# Patient Record
Sex: FEMALE | Race: WHITE | NOT HISPANIC OR LATINO
[De-identification: names, ages, dates, MRNs, and addresses within clinical notes are randomized per-mention and may not be internally consistent; named-entity substitution may affect disease eponyms.]

---

## 2023-01-01 ENCOUNTER — TRANSCRIPTION ENCOUNTER (OUTPATIENT)
Age: 63
End: 2023-01-01

## 2023-01-01 ENCOUNTER — INPATIENT (INPATIENT)
Facility: HOSPITAL | Age: 63
LOS: 11 days | Discharge: EXPIRED | End: 2023-05-21
Attending: INTERNAL MEDICINE | Admitting: STUDENT IN AN ORGANIZED HEALTH CARE EDUCATION/TRAINING PROGRAM
Payer: MEDICAID

## 2023-01-01 VITALS
DIASTOLIC BLOOD PRESSURE: 23 MMHG | RESPIRATION RATE: 7 BRPM | OXYGEN SATURATION: 86 % | SYSTOLIC BLOOD PRESSURE: 37 MMHG

## 2023-01-01 VITALS
HEART RATE: 107 BPM | SYSTOLIC BLOOD PRESSURE: 104 MMHG | TEMPERATURE: 98 F | WEIGHT: 204.81 LBS | HEIGHT: 64 IN | DIASTOLIC BLOOD PRESSURE: 63 MMHG

## 2023-01-01 DIAGNOSIS — J96.90 RESPIRATORY FAILURE, UNSPECIFIED, UNSPECIFIED WHETHER WITH HYPOXIA OR HYPERCAPNIA: ICD-10-CM

## 2023-01-01 DIAGNOSIS — R53.81 OTHER MALAISE: ICD-10-CM

## 2023-01-01 DIAGNOSIS — Z51.5 ENCOUNTER FOR PALLIATIVE CARE: ICD-10-CM

## 2023-01-01 DIAGNOSIS — J84.9 INTERSTITIAL PULMONARY DISEASE, UNSPECIFIED: ICD-10-CM

## 2023-01-01 DIAGNOSIS — J98.9 RESPIRATORY DISORDER, UNSPECIFIED: ICD-10-CM

## 2023-01-01 DIAGNOSIS — Z71.89 OTHER SPECIFIED COUNSELING: ICD-10-CM

## 2023-01-01 DIAGNOSIS — F41.9 ANXIETY DISORDER, UNSPECIFIED: ICD-10-CM

## 2023-01-01 DIAGNOSIS — R06.00 DYSPNEA, UNSPECIFIED: ICD-10-CM

## 2023-01-01 LAB
A1C WITH ESTIMATED AVERAGE GLUCOSE RESULT: 7.1 % — HIGH (ref 4–5.6)
ALBUMIN SERPL ELPH-MCNC: 2 G/DL — LOW (ref 3.3–5)
ALBUMIN SERPL ELPH-MCNC: 2 G/DL — LOW (ref 3.3–5)
ALBUMIN SERPL ELPH-MCNC: 2.2 G/DL — LOW (ref 3.3–5)
ALBUMIN SERPL ELPH-MCNC: 2.3 G/DL — LOW (ref 3.3–5)
ALBUMIN SERPL ELPH-MCNC: 2.3 G/DL — LOW (ref 3.3–5)
ALBUMIN SERPL ELPH-MCNC: 2.4 G/DL — LOW (ref 3.3–5)
ALBUMIN SERPL ELPH-MCNC: 2.6 G/DL — LOW (ref 3.3–5)
ALBUMIN SERPL ELPH-MCNC: 2.8 G/DL — LOW (ref 3.3–5)
ALP SERPL-CCNC: 129 U/L — HIGH (ref 40–120)
ALP SERPL-CCNC: 130 U/L — HIGH (ref 40–120)
ALP SERPL-CCNC: 131 U/L — HIGH (ref 40–120)
ALP SERPL-CCNC: 132 U/L — HIGH (ref 40–120)
ALP SERPL-CCNC: 134 U/L — HIGH (ref 40–120)
ALP SERPL-CCNC: 134 U/L — HIGH (ref 40–120)
ALP SERPL-CCNC: 136 U/L — HIGH (ref 40–120)
ALP SERPL-CCNC: 137 U/L — HIGH (ref 40–120)
ALP SERPL-CCNC: 138 U/L — HIGH (ref 40–120)
ALP SERPL-CCNC: 143 U/L — HIGH (ref 40–120)
ALP SERPL-CCNC: 144 U/L — HIGH (ref 40–120)
ALP SERPL-CCNC: 146 U/L — HIGH (ref 40–120)
ALP SERPL-CCNC: 146 U/L — HIGH (ref 40–120)
ALP SERPL-CCNC: 150 U/L — HIGH (ref 40–120)
ALP SERPL-CCNC: 154 U/L — HIGH (ref 40–120)
ALT FLD-CCNC: 12 U/L — SIGNIFICANT CHANGE UP (ref 4–33)
ALT FLD-CCNC: 13 U/L — SIGNIFICANT CHANGE UP (ref 4–33)
ALT FLD-CCNC: 13 U/L — SIGNIFICANT CHANGE UP (ref 4–33)
ALT FLD-CCNC: 14 U/L — SIGNIFICANT CHANGE UP (ref 4–33)
ALT FLD-CCNC: 15 U/L — SIGNIFICANT CHANGE UP (ref 4–33)
ALT FLD-CCNC: 16 U/L — SIGNIFICANT CHANGE UP (ref 4–33)
ALT FLD-CCNC: 18 U/L — SIGNIFICANT CHANGE UP (ref 4–33)
ALT FLD-CCNC: 19 U/L — SIGNIFICANT CHANGE UP (ref 4–33)
ALT FLD-CCNC: 20 U/L — SIGNIFICANT CHANGE UP (ref 4–33)
ALT FLD-CCNC: 20 U/L — SIGNIFICANT CHANGE UP (ref 4–33)
ALT FLD-CCNC: 21 U/L — SIGNIFICANT CHANGE UP (ref 4–33)
ALT FLD-CCNC: 21 U/L — SIGNIFICANT CHANGE UP (ref 4–33)
ALT FLD-CCNC: 22 U/L — SIGNIFICANT CHANGE UP (ref 4–33)
ALT FLD-CCNC: 25 U/L — SIGNIFICANT CHANGE UP (ref 4–33)
ALT FLD-CCNC: 28 U/L — SIGNIFICANT CHANGE UP (ref 4–33)
ANA TITR SER: NEGATIVE — SIGNIFICANT CHANGE UP
ANION GAP SERPL CALC-SCNC: 10 MMOL/L — SIGNIFICANT CHANGE UP (ref 7–14)
ANION GAP SERPL CALC-SCNC: 11 MMOL/L — SIGNIFICANT CHANGE UP (ref 7–14)
ANION GAP SERPL CALC-SCNC: 12 MMOL/L — SIGNIFICANT CHANGE UP (ref 7–14)
ANION GAP SERPL CALC-SCNC: 13 MMOL/L — SIGNIFICANT CHANGE UP (ref 7–14)
ANION GAP SERPL CALC-SCNC: 9 MMOL/L — SIGNIFICANT CHANGE UP (ref 7–14)
ANISOCYTOSIS BLD QL: SIGNIFICANT CHANGE UP
ANISOCYTOSIS BLD QL: SLIGHT — SIGNIFICANT CHANGE UP
APTT BLD: 157.7 SEC — CRITICAL HIGH (ref 27–36.3)
APTT BLD: 30.8 SEC — SIGNIFICANT CHANGE UP (ref 27–36.3)
APTT BLD: 33.1 SEC — SIGNIFICANT CHANGE UP (ref 27–36.3)
APTT BLD: 60.4 SEC — HIGH (ref 27–36.3)
APTT BLD: 62.7 SEC — HIGH (ref 27–36.3)
APTT BLD: 82.2 SEC — HIGH (ref 27–36.3)
AST SERPL-CCNC: 13 U/L — SIGNIFICANT CHANGE UP (ref 4–32)
AST SERPL-CCNC: 14 U/L — SIGNIFICANT CHANGE UP (ref 4–32)
AST SERPL-CCNC: 15 U/L — SIGNIFICANT CHANGE UP (ref 4–32)
AST SERPL-CCNC: 16 U/L — SIGNIFICANT CHANGE UP (ref 4–32)
AST SERPL-CCNC: 17 U/L — SIGNIFICANT CHANGE UP (ref 4–32)
AST SERPL-CCNC: 17 U/L — SIGNIFICANT CHANGE UP (ref 4–32)
AST SERPL-CCNC: 18 U/L — SIGNIFICANT CHANGE UP (ref 4–32)
AST SERPL-CCNC: 19 U/L — SIGNIFICANT CHANGE UP (ref 4–32)
AST SERPL-CCNC: 19 U/L — SIGNIFICANT CHANGE UP (ref 4–32)
AST SERPL-CCNC: 20 U/L — SIGNIFICANT CHANGE UP (ref 4–32)
AST SERPL-CCNC: 20 U/L — SIGNIFICANT CHANGE UP (ref 4–32)
AST SERPL-CCNC: 21 U/L — SIGNIFICANT CHANGE UP (ref 4–32)
AST SERPL-CCNC: 21 U/L — SIGNIFICANT CHANGE UP (ref 4–32)
AUTO DIFF PNL BLD: ABNORMAL
B PERT DNA SPEC QL NAA+PROBE: SIGNIFICANT CHANGE UP
B PERT+PARAPERT DNA PNL SPEC NAA+PROBE: SIGNIFICANT CHANGE UP
BASE EXCESS BLDA CALC-SCNC: 11 MMOL/L — HIGH (ref -2–3)
BASE EXCESS BLDA CALC-SCNC: 4.3 MMOL/L — HIGH (ref -2–3)
BASE EXCESS BLDA CALC-SCNC: 4.7 MMOL/L — HIGH (ref -2–3)
BASE EXCESS BLDA CALC-SCNC: 5 MMOL/L — HIGH (ref -2–3)
BASE EXCESS BLDA CALC-SCNC: 5.5 MMOL/L — HIGH (ref -2–3)
BASE EXCESS BLDA CALC-SCNC: 5.6 MMOL/L — HIGH (ref -2–3)
BASE EXCESS BLDA CALC-SCNC: 6.2 MMOL/L — HIGH (ref -2–3)
BASE EXCESS BLDA CALC-SCNC: 6.3 MMOL/L — HIGH (ref -2–3)
BASOPHILS # BLD AUTO: 0 K/UL — SIGNIFICANT CHANGE UP (ref 0–0.2)
BASOPHILS # BLD AUTO: 0.02 K/UL — SIGNIFICANT CHANGE UP (ref 0–0.2)
BASOPHILS # BLD AUTO: 0.03 K/UL — SIGNIFICANT CHANGE UP (ref 0–0.2)
BASOPHILS # BLD AUTO: 0.04 K/UL — SIGNIFICANT CHANGE UP (ref 0–0.2)
BASOPHILS # BLD AUTO: 0.05 K/UL — SIGNIFICANT CHANGE UP (ref 0–0.2)
BASOPHILS # BLD AUTO: 0.06 K/UL — SIGNIFICANT CHANGE UP (ref 0–0.2)
BASOPHILS # BLD AUTO: 0.06 K/UL — SIGNIFICANT CHANGE UP (ref 0–0.2)
BASOPHILS # BLD AUTO: 0.07 K/UL — SIGNIFICANT CHANGE UP (ref 0–0.2)
BASOPHILS # BLD AUTO: 0.08 K/UL — SIGNIFICANT CHANGE UP (ref 0–0.2)
BASOPHILS # BLD AUTO: 0.1 K/UL — SIGNIFICANT CHANGE UP (ref 0–0.2)
BASOPHILS NFR BLD AUTO: 0 % — SIGNIFICANT CHANGE UP (ref 0–2)
BASOPHILS NFR BLD AUTO: 0.2 % — SIGNIFICANT CHANGE UP (ref 0–2)
BASOPHILS NFR BLD AUTO: 0.3 % — SIGNIFICANT CHANGE UP (ref 0–2)
BASOPHILS NFR BLD AUTO: 0.3 % — SIGNIFICANT CHANGE UP (ref 0–2)
BASOPHILS NFR BLD AUTO: 0.4 % — SIGNIFICANT CHANGE UP (ref 0–2)
BASOPHILS NFR BLD AUTO: 0.5 % — SIGNIFICANT CHANGE UP (ref 0–2)
BASOPHILS NFR BLD AUTO: 0.6 % — SIGNIFICANT CHANGE UP (ref 0–2)
BASOPHILS NFR BLD AUTO: 0.7 % — SIGNIFICANT CHANGE UP (ref 0–2)
BASOPHILS NFR BLD AUTO: 1 % — SIGNIFICANT CHANGE UP (ref 0–2)
BILIRUB SERPL-MCNC: 0.3 MG/DL — SIGNIFICANT CHANGE UP (ref 0.2–1.2)
BILIRUB SERPL-MCNC: 0.4 MG/DL — SIGNIFICANT CHANGE UP (ref 0.2–1.2)
BILIRUB SERPL-MCNC: 0.5 MG/DL — SIGNIFICANT CHANGE UP (ref 0.2–1.2)
BILIRUB SERPL-MCNC: 0.5 MG/DL — SIGNIFICANT CHANGE UP (ref 0.2–1.2)
BILIRUB SERPL-MCNC: 0.6 MG/DL — SIGNIFICANT CHANGE UP (ref 0.2–1.2)
BILIRUB SERPL-MCNC: 0.7 MG/DL — SIGNIFICANT CHANGE UP (ref 0.2–1.2)
BILIRUB SERPL-MCNC: 0.8 MG/DL — SIGNIFICANT CHANGE UP (ref 0.2–1.2)
BLD GP AB SCN SERPL QL: NEGATIVE — SIGNIFICANT CHANGE UP
BLOOD GAS ARTERIAL - LYTES,HGB,ICA,LACT RESULT: SIGNIFICANT CHANGE UP
BLOOD GAS ARTERIAL COMPREHENSIVE RESULT: SIGNIFICANT CHANGE UP
BLOOD GAS VENOUS COMPREHENSIVE RESULT: SIGNIFICANT CHANGE UP
BORDETELLA PARAPERTUSSIS (RAPRVP): SIGNIFICANT CHANGE UP
BUN SERPL-MCNC: 15 MG/DL — SIGNIFICANT CHANGE UP (ref 7–23)
BUN SERPL-MCNC: 16 MG/DL — SIGNIFICANT CHANGE UP (ref 7–23)
BUN SERPL-MCNC: 17 MG/DL — SIGNIFICANT CHANGE UP (ref 7–23)
BUN SERPL-MCNC: 18 MG/DL — SIGNIFICANT CHANGE UP (ref 7–23)
BUN SERPL-MCNC: 19 MG/DL — SIGNIFICANT CHANGE UP (ref 7–23)
BUN SERPL-MCNC: 19 MG/DL — SIGNIFICANT CHANGE UP (ref 7–23)
BUN SERPL-MCNC: 26 MG/DL — HIGH (ref 7–23)
BUN SERPL-MCNC: 27 MG/DL — HIGH (ref 7–23)
BUN SERPL-MCNC: 27 MG/DL — HIGH (ref 7–23)
BUN SERPL-MCNC: 29 MG/DL — HIGH (ref 7–23)
BUN SERPL-MCNC: 32 MG/DL — HIGH (ref 7–23)
BUN SERPL-MCNC: 34 MG/DL — HIGH (ref 7–23)
BUN SERPL-MCNC: 35 MG/DL — HIGH (ref 7–23)
BUN SERPL-MCNC: 35 MG/DL — HIGH (ref 7–23)
BUN SERPL-MCNC: 37 MG/DL — HIGH (ref 7–23)
BUN SERPL-MCNC: 40 MG/DL — HIGH (ref 7–23)
BUN SERPL-MCNC: 40 MG/DL — HIGH (ref 7–23)
BUN SERPL-MCNC: 42 MG/DL — HIGH (ref 7–23)
C PNEUM DNA SPEC QL NAA+PROBE: SIGNIFICANT CHANGE UP
C-ANCA SER-ACNC: NEGATIVE — SIGNIFICANT CHANGE UP
CALCIUM SERPL-MCNC: 10 MG/DL — SIGNIFICANT CHANGE UP (ref 8.4–10.5)
CALCIUM SERPL-MCNC: 8.6 MG/DL — SIGNIFICANT CHANGE UP (ref 8.4–10.5)
CALCIUM SERPL-MCNC: 8.9 MG/DL — SIGNIFICANT CHANGE UP (ref 8.4–10.5)
CALCIUM SERPL-MCNC: 8.9 MG/DL — SIGNIFICANT CHANGE UP (ref 8.4–10.5)
CALCIUM SERPL-MCNC: 9 MG/DL — SIGNIFICANT CHANGE UP (ref 8.4–10.5)
CALCIUM SERPL-MCNC: 9.1 MG/DL — SIGNIFICANT CHANGE UP (ref 8.4–10.5)
CALCIUM SERPL-MCNC: 9.2 MG/DL — SIGNIFICANT CHANGE UP (ref 8.4–10.5)
CALCIUM SERPL-MCNC: 9.3 MG/DL — SIGNIFICANT CHANGE UP (ref 8.4–10.5)
CALCIUM SERPL-MCNC: 9.4 MG/DL — SIGNIFICANT CHANGE UP (ref 8.4–10.5)
CALCIUM SERPL-MCNC: 9.5 MG/DL — SIGNIFICANT CHANGE UP (ref 8.4–10.5)
CALCIUM SERPL-MCNC: 9.6 MG/DL — SIGNIFICANT CHANGE UP (ref 8.4–10.5)
CALCIUM SERPL-MCNC: 9.6 MG/DL — SIGNIFICANT CHANGE UP (ref 8.4–10.5)
CHLORIDE SERPL-SCNC: 100 MMOL/L — SIGNIFICANT CHANGE UP (ref 98–107)
CHLORIDE SERPL-SCNC: 92 MMOL/L — LOW (ref 98–107)
CHLORIDE SERPL-SCNC: 92 MMOL/L — LOW (ref 98–107)
CHLORIDE SERPL-SCNC: 93 MMOL/L — LOW (ref 98–107)
CHLORIDE SERPL-SCNC: 93 MMOL/L — LOW (ref 98–107)
CHLORIDE SERPL-SCNC: 94 MMOL/L — LOW (ref 98–107)
CHLORIDE SERPL-SCNC: 94 MMOL/L — LOW (ref 98–107)
CHLORIDE SERPL-SCNC: 95 MMOL/L — LOW (ref 98–107)
CHLORIDE SERPL-SCNC: 96 MMOL/L — LOW (ref 98–107)
CHLORIDE SERPL-SCNC: 98 MMOL/L — SIGNIFICANT CHANGE UP (ref 98–107)
CHLORIDE SERPL-SCNC: 99 MMOL/L — SIGNIFICANT CHANGE UP (ref 98–107)
CHLORIDE UR-SCNC: <20 MMOL/L — SIGNIFICANT CHANGE UP
CK SERPL-CCNC: 22 U/L — LOW (ref 25–170)
CK SERPL-CCNC: 23 U/L — LOW (ref 25–170)
CO2 BLDA-SCNC: 30 MMOL/L — HIGH (ref 19–24)
CO2 BLDA-SCNC: 31 MMOL/L — HIGH (ref 19–24)
CO2 BLDA-SCNC: 31 MMOL/L — HIGH (ref 19–24)
CO2 BLDA-SCNC: 32 MMOL/L — HIGH (ref 19–24)
CO2 BLDA-SCNC: 33 MMOL/L — HIGH (ref 19–24)
CO2 BLDA-SCNC: 37 MMOL/L — HIGH (ref 19–24)
CO2 SERPL-SCNC: 23 MMOL/L — SIGNIFICANT CHANGE UP (ref 22–31)
CO2 SERPL-SCNC: 25 MMOL/L — SIGNIFICANT CHANGE UP (ref 22–31)
CO2 SERPL-SCNC: 26 MMOL/L — SIGNIFICANT CHANGE UP (ref 22–31)
CO2 SERPL-SCNC: 26 MMOL/L — SIGNIFICANT CHANGE UP (ref 22–31)
CO2 SERPL-SCNC: 27 MMOL/L — SIGNIFICANT CHANGE UP (ref 22–31)
CO2 SERPL-SCNC: 28 MMOL/L — SIGNIFICANT CHANGE UP (ref 22–31)
CO2 SERPL-SCNC: 29 MMOL/L — SIGNIFICANT CHANGE UP (ref 22–31)
CO2 SERPL-SCNC: 29 MMOL/L — SIGNIFICANT CHANGE UP (ref 22–31)
CO2 SERPL-SCNC: 30 MMOL/L — SIGNIFICANT CHANGE UP (ref 22–31)
COHGB MFR BLDA: 1.6 % — SIGNIFICANT CHANGE UP
COHGB MFR BLDA: 2 % — SIGNIFICANT CHANGE UP
COHGB MFR BLDA: 2.1 % — SIGNIFICANT CHANGE UP
COHGB MFR BLDA: 2.3 % — SIGNIFICANT CHANGE UP
COHGB MFR BLDA: 2.4 % — SIGNIFICANT CHANGE UP
COHGB MFR BLDA: 2.4 % — SIGNIFICANT CHANGE UP
COHGB MFR BLDA: 2.6 % — SIGNIFICANT CHANGE UP
COHGB MFR BLDA: 3.4 % — HIGH
CREAT ?TM UR-MCNC: 32 MG/DL — SIGNIFICANT CHANGE UP
CREAT ?TM UR-MCNC: 62 MG/DL — SIGNIFICANT CHANGE UP
CREAT SERPL-MCNC: 0.57 MG/DL — SIGNIFICANT CHANGE UP (ref 0.5–1.3)
CREAT SERPL-MCNC: 0.64 MG/DL — SIGNIFICANT CHANGE UP (ref 0.5–1.3)
CREAT SERPL-MCNC: 0.67 MG/DL — SIGNIFICANT CHANGE UP (ref 0.5–1.3)
CREAT SERPL-MCNC: 0.69 MG/DL — SIGNIFICANT CHANGE UP (ref 0.5–1.3)
CREAT SERPL-MCNC: 0.69 MG/DL — SIGNIFICANT CHANGE UP (ref 0.5–1.3)
CREAT SERPL-MCNC: 0.72 MG/DL — SIGNIFICANT CHANGE UP (ref 0.5–1.3)
CREAT SERPL-MCNC: 0.72 MG/DL — SIGNIFICANT CHANGE UP (ref 0.5–1.3)
CREAT SERPL-MCNC: 0.78 MG/DL — SIGNIFICANT CHANGE UP (ref 0.5–1.3)
CREAT SERPL-MCNC: 0.8 MG/DL — SIGNIFICANT CHANGE UP (ref 0.5–1.3)
CREAT SERPL-MCNC: 0.85 MG/DL — SIGNIFICANT CHANGE UP (ref 0.5–1.3)
CREAT SERPL-MCNC: 0.9 MG/DL — SIGNIFICANT CHANGE UP (ref 0.5–1.3)
CREAT SERPL-MCNC: 0.92 MG/DL — SIGNIFICANT CHANGE UP (ref 0.5–1.3)
CREAT SERPL-MCNC: 0.96 MG/DL — SIGNIFICANT CHANGE UP (ref 0.5–1.3)
CREAT SERPL-MCNC: 1 MG/DL — SIGNIFICANT CHANGE UP (ref 0.5–1.3)
CREAT SERPL-MCNC: 1 MG/DL — SIGNIFICANT CHANGE UP (ref 0.5–1.3)
CREAT SERPL-MCNC: 1.01 MG/DL — SIGNIFICANT CHANGE UP (ref 0.5–1.3)
CREAT SERPL-MCNC: 1.04 MG/DL — SIGNIFICANT CHANGE UP (ref 0.5–1.3)
CREAT SERPL-MCNC: 1.06 MG/DL — SIGNIFICANT CHANGE UP (ref 0.5–1.3)
CREAT SERPL-MCNC: 1.14 MG/DL — SIGNIFICANT CHANGE UP (ref 0.5–1.3)
CREAT SERPL-MCNC: 1.28 MG/DL — SIGNIFICANT CHANGE UP (ref 0.5–1.3)
CREAT SERPL-MCNC: 1.36 MG/DL — HIGH (ref 0.5–1.3)
CREAT SERPL-MCNC: 1.42 MG/DL — HIGH (ref 0.5–1.3)
CREAT SERPL-MCNC: 1.56 MG/DL — HIGH (ref 0.5–1.3)
CULTURE RESULTS: NO GROWTH — SIGNIFICANT CHANGE UP
CULTURE RESULTS: SIGNIFICANT CHANGE UP
CULTURE RESULTS: SIGNIFICANT CHANGE UP
EGFR: 100 ML/MIN/1.73M2 — SIGNIFICANT CHANGE UP
EGFR: 103 ML/MIN/1.73M2 — SIGNIFICANT CHANGE UP
EGFR: 38 ML/MIN/1.73M2 — LOW
EGFR: 42 ML/MIN/1.73M2 — LOW
EGFR: 44 ML/MIN/1.73M2 — LOW
EGFR: 48 ML/MIN/1.73M2 — LOW
EGFR: 55 ML/MIN/1.73M2 — LOW
EGFR: 60 ML/MIN/1.73M2 — SIGNIFICANT CHANGE UP
EGFR: 61 ML/MIN/1.73M2 — SIGNIFICANT CHANGE UP
EGFR: 63 ML/MIN/1.73M2 — SIGNIFICANT CHANGE UP
EGFR: 64 ML/MIN/1.73M2 — SIGNIFICANT CHANGE UP
EGFR: 64 ML/MIN/1.73M2 — SIGNIFICANT CHANGE UP
EGFR: 67 ML/MIN/1.73M2 — SIGNIFICANT CHANGE UP
EGFR: 71 ML/MIN/1.73M2 — SIGNIFICANT CHANGE UP
EGFR: 73 ML/MIN/1.73M2 — SIGNIFICANT CHANGE UP
EGFR: 78 ML/MIN/1.73M2 — SIGNIFICANT CHANGE UP
EGFR: 84 ML/MIN/1.73M2 — SIGNIFICANT CHANGE UP
EGFR: 86 ML/MIN/1.73M2 — SIGNIFICANT CHANGE UP
EGFR: 95 ML/MIN/1.73M2 — SIGNIFICANT CHANGE UP
EGFR: 95 ML/MIN/1.73M2 — SIGNIFICANT CHANGE UP
EGFR: 99 ML/MIN/1.73M2 — SIGNIFICANT CHANGE UP
EOSINOPHIL # BLD AUTO: 0 K/UL — SIGNIFICANT CHANGE UP (ref 0–0.5)
EOSINOPHIL # BLD AUTO: 0.01 K/UL — SIGNIFICANT CHANGE UP (ref 0–0.5)
EOSINOPHIL # BLD AUTO: 0.02 K/UL — SIGNIFICANT CHANGE UP (ref 0–0.5)
EOSINOPHIL # BLD AUTO: 0.02 K/UL — SIGNIFICANT CHANGE UP (ref 0–0.5)
EOSINOPHIL # BLD AUTO: 0.03 K/UL — SIGNIFICANT CHANGE UP (ref 0–0.5)
EOSINOPHIL # BLD AUTO: 0.04 K/UL — SIGNIFICANT CHANGE UP (ref 0–0.5)
EOSINOPHIL # BLD AUTO: 0.09 K/UL — SIGNIFICANT CHANGE UP (ref 0–0.5)
EOSINOPHIL # BLD AUTO: 0.11 K/UL — SIGNIFICANT CHANGE UP (ref 0–0.5)
EOSINOPHIL # BLD AUTO: 0.19 K/UL — SIGNIFICANT CHANGE UP (ref 0–0.5)
EOSINOPHIL # BLD AUTO: 0.27 K/UL — SIGNIFICANT CHANGE UP (ref 0–0.5)
EOSINOPHIL NFR BLD AUTO: 0 % — SIGNIFICANT CHANGE UP (ref 0–6)
EOSINOPHIL NFR BLD AUTO: 0.1 % — SIGNIFICANT CHANGE UP (ref 0–6)
EOSINOPHIL NFR BLD AUTO: 0.2 % — SIGNIFICANT CHANGE UP (ref 0–6)
EOSINOPHIL NFR BLD AUTO: 0.4 % — SIGNIFICANT CHANGE UP (ref 0–6)
EOSINOPHIL NFR BLD AUTO: 0.4 % — SIGNIFICANT CHANGE UP (ref 0–6)
EOSINOPHIL NFR BLD AUTO: 0.5 % — SIGNIFICANT CHANGE UP (ref 0–6)
EOSINOPHIL NFR BLD AUTO: 0.6 % — SIGNIFICANT CHANGE UP (ref 0–6)
EOSINOPHIL NFR BLD AUTO: 0.9 % — SIGNIFICANT CHANGE UP (ref 0–6)
EOSINOPHIL NFR BLD AUTO: 0.9 % — SIGNIFICANT CHANGE UP (ref 0–6)
EOSINOPHIL NFR BLD AUTO: 1.8 % — SIGNIFICANT CHANGE UP (ref 0–6)
ESTIMATED AVERAGE GLUCOSE: 157 — SIGNIFICANT CHANGE UP
FLUAV SUBTYP SPEC NAA+PROBE: SIGNIFICANT CHANGE UP
FLUBV RNA SPEC QL NAA+PROBE: SIGNIFICANT CHANGE UP
FUNGITELL: 246 PG/ML — HIGH
GAS PNL BLDA: SIGNIFICANT CHANGE UP
GIANT PLATELETS BLD QL SMEAR: PRESENT — SIGNIFICANT CHANGE UP
GIANT PLATELETS BLD QL SMEAR: PRESENT — SIGNIFICANT CHANGE UP
GLUCOSE BLDC GLUCOMTR-MCNC: 100 MG/DL — HIGH (ref 70–99)
GLUCOSE BLDC GLUCOMTR-MCNC: 110 MG/DL — HIGH (ref 70–99)
GLUCOSE BLDC GLUCOMTR-MCNC: 122 MG/DL — HIGH (ref 70–99)
GLUCOSE BLDC GLUCOMTR-MCNC: 122 MG/DL — HIGH (ref 70–99)
GLUCOSE BLDC GLUCOMTR-MCNC: 128 MG/DL — HIGH (ref 70–99)
GLUCOSE BLDC GLUCOMTR-MCNC: 135 MG/DL — HIGH (ref 70–99)
GLUCOSE BLDC GLUCOMTR-MCNC: 138 MG/DL — HIGH (ref 70–99)
GLUCOSE BLDC GLUCOMTR-MCNC: 144 MG/DL — HIGH (ref 70–99)
GLUCOSE BLDC GLUCOMTR-MCNC: 147 MG/DL — HIGH (ref 70–99)
GLUCOSE BLDC GLUCOMTR-MCNC: 148 MG/DL — HIGH (ref 70–99)
GLUCOSE BLDC GLUCOMTR-MCNC: 149 MG/DL — HIGH (ref 70–99)
GLUCOSE BLDC GLUCOMTR-MCNC: 151 MG/DL — HIGH (ref 70–99)
GLUCOSE BLDC GLUCOMTR-MCNC: 153 MG/DL — HIGH (ref 70–99)
GLUCOSE BLDC GLUCOMTR-MCNC: 155 MG/DL — HIGH (ref 70–99)
GLUCOSE BLDC GLUCOMTR-MCNC: 162 MG/DL — HIGH (ref 70–99)
GLUCOSE BLDC GLUCOMTR-MCNC: 167 MG/DL — HIGH (ref 70–99)
GLUCOSE BLDC GLUCOMTR-MCNC: 167 MG/DL — HIGH (ref 70–99)
GLUCOSE BLDC GLUCOMTR-MCNC: 169 MG/DL — HIGH (ref 70–99)
GLUCOSE BLDC GLUCOMTR-MCNC: 173 MG/DL — HIGH (ref 70–99)
GLUCOSE BLDC GLUCOMTR-MCNC: 175 MG/DL — HIGH (ref 70–99)
GLUCOSE BLDC GLUCOMTR-MCNC: 178 MG/DL — HIGH (ref 70–99)
GLUCOSE BLDC GLUCOMTR-MCNC: 179 MG/DL — HIGH (ref 70–99)
GLUCOSE BLDC GLUCOMTR-MCNC: 182 MG/DL — HIGH (ref 70–99)
GLUCOSE BLDC GLUCOMTR-MCNC: 185 MG/DL — HIGH (ref 70–99)
GLUCOSE BLDC GLUCOMTR-MCNC: 191 MG/DL — HIGH (ref 70–99)
GLUCOSE BLDC GLUCOMTR-MCNC: 192 MG/DL — HIGH (ref 70–99)
GLUCOSE BLDC GLUCOMTR-MCNC: 200 MG/DL — HIGH (ref 70–99)
GLUCOSE BLDC GLUCOMTR-MCNC: 215 MG/DL — HIGH (ref 70–99)
GLUCOSE BLDC GLUCOMTR-MCNC: 222 MG/DL — HIGH (ref 70–99)
GLUCOSE BLDC GLUCOMTR-MCNC: 224 MG/DL — HIGH (ref 70–99)
GLUCOSE BLDC GLUCOMTR-MCNC: 224 MG/DL — HIGH (ref 70–99)
GLUCOSE BLDC GLUCOMTR-MCNC: 225 MG/DL — HIGH (ref 70–99)
GLUCOSE BLDC GLUCOMTR-MCNC: 232 MG/DL — HIGH (ref 70–99)
GLUCOSE BLDC GLUCOMTR-MCNC: 233 MG/DL — HIGH (ref 70–99)
GLUCOSE BLDC GLUCOMTR-MCNC: 236 MG/DL — HIGH (ref 70–99)
GLUCOSE BLDC GLUCOMTR-MCNC: 254 MG/DL — HIGH (ref 70–99)
GLUCOSE BLDC GLUCOMTR-MCNC: 276 MG/DL — HIGH (ref 70–99)
GLUCOSE BLDC GLUCOMTR-MCNC: 279 MG/DL — HIGH (ref 70–99)
GLUCOSE BLDC GLUCOMTR-MCNC: 289 MG/DL — HIGH (ref 70–99)
GLUCOSE BLDC GLUCOMTR-MCNC: 381 MG/DL — HIGH (ref 70–99)
GLUCOSE BLDC GLUCOMTR-MCNC: 49 MG/DL — CRITICAL LOW (ref 70–99)
GLUCOSE BLDC GLUCOMTR-MCNC: 87 MG/DL — SIGNIFICANT CHANGE UP (ref 70–99)
GLUCOSE BLDC GLUCOMTR-MCNC: 89 MG/DL — SIGNIFICANT CHANGE UP (ref 70–99)
GLUCOSE SERPL-MCNC: 134 MG/DL — HIGH (ref 70–99)
GLUCOSE SERPL-MCNC: 146 MG/DL — HIGH (ref 70–99)
GLUCOSE SERPL-MCNC: 152 MG/DL — HIGH (ref 70–99)
GLUCOSE SERPL-MCNC: 153 MG/DL — HIGH (ref 70–99)
GLUCOSE SERPL-MCNC: 156 MG/DL — HIGH (ref 70–99)
GLUCOSE SERPL-MCNC: 157 MG/DL — HIGH (ref 70–99)
GLUCOSE SERPL-MCNC: 177 MG/DL — HIGH (ref 70–99)
GLUCOSE SERPL-MCNC: 182 MG/DL — HIGH (ref 70–99)
GLUCOSE SERPL-MCNC: 184 MG/DL — HIGH (ref 70–99)
GLUCOSE SERPL-MCNC: 187 MG/DL — HIGH (ref 70–99)
GLUCOSE SERPL-MCNC: 190 MG/DL — HIGH (ref 70–99)
GLUCOSE SERPL-MCNC: 193 MG/DL — HIGH (ref 70–99)
GLUCOSE SERPL-MCNC: 199 MG/DL — HIGH (ref 70–99)
GLUCOSE SERPL-MCNC: 199 MG/DL — HIGH (ref 70–99)
GLUCOSE SERPL-MCNC: 208 MG/DL — HIGH (ref 70–99)
GLUCOSE SERPL-MCNC: 246 MG/DL — HIGH (ref 70–99)
GLUCOSE SERPL-MCNC: 248 MG/DL — HIGH (ref 70–99)
GLUCOSE SERPL-MCNC: 260 MG/DL — HIGH (ref 70–99)
GLUCOSE SERPL-MCNC: 272 MG/DL — HIGH (ref 70–99)
GLUCOSE SERPL-MCNC: 304 MG/DL — HIGH (ref 70–99)
GLUCOSE SERPL-MCNC: 372 MG/DL — HIGH (ref 70–99)
HADV DNA SPEC QL NAA+PROBE: SIGNIFICANT CHANGE UP
HAPTOGLOB SERPL-MCNC: 116 MG/DL — SIGNIFICANT CHANGE UP (ref 34–200)
HCO3 BLDA-SCNC: 29 MMOL/L — HIGH (ref 21–28)
HCO3 BLDA-SCNC: 30 MMOL/L — HIGH (ref 21–28)
HCO3 BLDA-SCNC: 30 MMOL/L — HIGH (ref 21–28)
HCO3 BLDA-SCNC: 31 MMOL/L — HIGH (ref 21–28)
HCO3 BLDA-SCNC: 36 MMOL/L — HIGH (ref 21–28)
HCOV 229E RNA SPEC QL NAA+PROBE: SIGNIFICANT CHANGE UP
HCOV HKU1 RNA SPEC QL NAA+PROBE: SIGNIFICANT CHANGE UP
HCOV NL63 RNA SPEC QL NAA+PROBE: SIGNIFICANT CHANGE UP
HCOV OC43 RNA SPEC QL NAA+PROBE: SIGNIFICANT CHANGE UP
HCT VFR BLD CALC: 22.2 % — LOW (ref 34.5–45)
HCT VFR BLD CALC: 22.9 % — LOW (ref 34.5–45)
HCT VFR BLD CALC: 23.7 % — LOW (ref 34.5–45)
HCT VFR BLD CALC: 24 % — LOW (ref 34.5–45)
HCT VFR BLD CALC: 24.1 % — LOW (ref 34.5–45)
HCT VFR BLD CALC: 24.5 % — LOW (ref 34.5–45)
HCT VFR BLD CALC: 24.8 % — LOW (ref 34.5–45)
HCT VFR BLD CALC: 25 % — LOW (ref 34.5–45)
HCT VFR BLD CALC: 25 % — LOW (ref 34.5–45)
HCT VFR BLD CALC: 25.3 % — LOW (ref 34.5–45)
HCT VFR BLD CALC: 25.6 % — LOW (ref 34.5–45)
HCT VFR BLD CALC: 25.9 % — LOW (ref 34.5–45)
HCT VFR BLD CALC: 26.4 % — LOW (ref 34.5–45)
HCT VFR BLD CALC: 26.5 % — LOW (ref 34.5–45)
HCT VFR BLD CALC: 27 % — LOW (ref 34.5–45)
HCT VFR BLD CALC: 27.1 % — LOW (ref 34.5–45)
HCT VFR BLD CALC: 27.5 % — LOW (ref 34.5–45)
HCT VFR BLD CALC: 27.7 % — LOW (ref 34.5–45)
HCT VFR BLD CALC: 27.8 % — LOW (ref 34.5–45)
HCT VFR BLD CALC: 27.8 % — LOW (ref 34.5–45)
HCT VFR BLD CALC: 27.9 % — LOW (ref 34.5–45)
HCT VFR BLD CALC: 28.1 % — LOW (ref 34.5–45)
HCT VFR BLD CALC: 28.3 % — LOW (ref 34.5–45)
HCT VFR BLD CALC: 28.7 % — LOW (ref 34.5–45)
HCV AB S/CO SERPL IA: 0.1 S/CO — SIGNIFICANT CHANGE UP (ref 0–0.99)
HCV AB SERPL-IMP: SIGNIFICANT CHANGE UP
HGB BLD-MCNC: 6.7 G/DL — CRITICAL LOW (ref 11.5–15.5)
HGB BLD-MCNC: 6.8 G/DL — CRITICAL LOW (ref 11.5–15.5)
HGB BLD-MCNC: 6.9 G/DL — CRITICAL LOW (ref 11.5–15.5)
HGB BLD-MCNC: 7.2 G/DL — LOW (ref 11.5–15.5)
HGB BLD-MCNC: 7.4 G/DL — LOW (ref 11.5–15.5)
HGB BLD-MCNC: 7.5 G/DL — LOW (ref 11.5–15.5)
HGB BLD-MCNC: 7.6 G/DL — LOW (ref 11.5–15.5)
HGB BLD-MCNC: 7.6 G/DL — LOW (ref 11.5–15.5)
HGB BLD-MCNC: 7.7 G/DL — LOW (ref 11.5–15.5)
HGB BLD-MCNC: 7.8 G/DL — LOW (ref 11.5–15.5)
HGB BLD-MCNC: 7.8 G/DL — LOW (ref 11.5–15.5)
HGB BLD-MCNC: 7.9 G/DL — LOW (ref 11.5–15.5)
HGB BLD-MCNC: 7.9 G/DL — LOW (ref 11.5–15.5)
HGB BLD-MCNC: 8 G/DL — LOW (ref 11.5–15.5)
HGB BLD-MCNC: 8.2 G/DL — LOW (ref 11.5–15.5)
HGB BLD-MCNC: 8.2 G/DL — LOW (ref 11.5–15.5)
HGB BLD-MCNC: 8.3 G/DL — LOW (ref 11.5–15.5)
HGB BLD-MCNC: 8.4 G/DL — LOW (ref 11.5–15.5)
HGB BLD-MCNC: 8.4 G/DL — LOW (ref 11.5–15.5)
HGB BLD-MCNC: 8.5 G/DL — LOW (ref 11.5–15.5)
HGB BLD-MCNC: 8.6 G/DL — LOW (ref 11.5–15.5)
HGB BLD-MCNC: 8.6 G/DL — LOW (ref 11.5–15.5)
HGB BLDA-MCNC: 7.2 G/DL — LOW (ref 11.7–16.1)
HGB BLDA-MCNC: 7.6 G/DL — LOW (ref 11.7–16.1)
HGB BLDA-MCNC: 8.2 G/DL — LOW (ref 11.7–16.1)
HGB BLDA-MCNC: 8.5 G/DL — LOW (ref 11.7–16.1)
HGB BLDA-MCNC: 8.5 G/DL — LOW (ref 11.7–16.1)
HGB BLDA-MCNC: 8.6 G/DL — LOW (ref 11.7–16.1)
HGB BLDA-MCNC: 8.7 G/DL — LOW (ref 11.7–16.1)
HGB BLDA-MCNC: 9 G/DL — LOW (ref 11.7–16.1)
HIV 1+2 AB+HIV1 P24 AG SERPL QL IA: SIGNIFICANT CHANGE UP
HMPV RNA SPEC QL NAA+PROBE: SIGNIFICANT CHANGE UP
HOROWITZ INDEX BLDA+IHG-RTO: 60 — SIGNIFICANT CHANGE UP
HPIV1 RNA SPEC QL NAA+PROBE: SIGNIFICANT CHANGE UP
HPIV2 RNA SPEC QL NAA+PROBE: SIGNIFICANT CHANGE UP
HPIV3 RNA SPEC QL NAA+PROBE: SIGNIFICANT CHANGE UP
HPIV4 RNA SPEC QL NAA+PROBE: SIGNIFICANT CHANGE UP
HYPOCHROMIA BLD QL: SLIGHT — SIGNIFICANT CHANGE UP
IANC: 11.27 K/UL — HIGH (ref 1.8–7.4)
IANC: 13.03 K/UL — HIGH (ref 1.8–7.4)
IANC: 14.61 K/UL — HIGH (ref 1.8–7.4)
IANC: 15.43 K/UL — HIGH (ref 1.8–7.4)
IANC: 22.18 K/UL — HIGH (ref 1.8–7.4)
IANC: 5.62 K/UL — SIGNIFICANT CHANGE UP (ref 1.8–7.4)
IANC: 5.66 K/UL — SIGNIFICANT CHANGE UP (ref 1.8–7.4)
IANC: 6.17 K/UL — SIGNIFICANT CHANGE UP (ref 1.8–7.4)
IANC: 6.34 K/UL — SIGNIFICANT CHANGE UP (ref 1.8–7.4)
IANC: 6.34 K/UL — SIGNIFICANT CHANGE UP (ref 1.8–7.4)
IANC: 6.41 K/UL — SIGNIFICANT CHANGE UP (ref 1.8–7.4)
IANC: 6.44 K/UL — SIGNIFICANT CHANGE UP (ref 1.8–7.4)
IANC: 6.85 K/UL — SIGNIFICANT CHANGE UP (ref 1.8–7.4)
IANC: 6.96 K/UL — SIGNIFICANT CHANGE UP (ref 1.8–7.4)
IANC: 7.1 K/UL — SIGNIFICANT CHANGE UP (ref 1.8–7.4)
IANC: 7.67 K/UL — HIGH (ref 1.8–7.4)
IANC: 7.74 K/UL — HIGH (ref 1.8–7.4)
IANC: 8.08 K/UL — HIGH (ref 1.8–7.4)
IANC: 8.14 K/UL — HIGH (ref 1.8–7.4)
IANC: 8.44 K/UL — HIGH (ref 1.8–7.4)
IANC: 9.16 K/UL — HIGH (ref 1.8–7.4)
IMM GRANULOCYTES NFR BLD AUTO: 2.8 % — HIGH (ref 0–0.9)
IMM GRANULOCYTES NFR BLD AUTO: 3.4 % — HIGH (ref 0–0.9)
IMM GRANULOCYTES NFR BLD AUTO: 4.1 % — HIGH (ref 0–0.9)
IMM GRANULOCYTES NFR BLD AUTO: 4.6 % — HIGH (ref 0–0.9)
IMM GRANULOCYTES NFR BLD AUTO: 4.8 % — HIGH (ref 0–0.9)
IMM GRANULOCYTES NFR BLD AUTO: 5 % — HIGH (ref 0–0.9)
IMM GRANULOCYTES NFR BLD AUTO: 5.1 % — HIGH (ref 0–0.9)
IMM GRANULOCYTES NFR BLD AUTO: 5.3 % — HIGH (ref 0–0.9)
IMM GRANULOCYTES NFR BLD AUTO: 5.4 % — HIGH (ref 0–0.9)
IMM GRANULOCYTES NFR BLD AUTO: 5.6 % — HIGH (ref 0–0.9)
IMM GRANULOCYTES NFR BLD AUTO: 5.8 % — HIGH (ref 0–0.9)
IMM GRANULOCYTES NFR BLD AUTO: 5.9 % — HIGH (ref 0–0.9)
IMM GRANULOCYTES NFR BLD AUTO: 6 % — HIGH (ref 0–0.9)
IMM GRANULOCYTES NFR BLD AUTO: 6.4 % — HIGH (ref 0–0.9)
IMM GRANULOCYTES NFR BLD AUTO: 6.6 % — HIGH (ref 0–0.9)
IMM GRANULOCYTES NFR BLD AUTO: 6.6 % — HIGH (ref 0–0.9)
INR BLD: 1.08 RATIO — SIGNIFICANT CHANGE UP (ref 0.88–1.16)
INR BLD: 1.11 RATIO — SIGNIFICANT CHANGE UP (ref 0.88–1.16)
INR BLD: 1.12 RATIO — SIGNIFICANT CHANGE UP (ref 0.88–1.16)
INR BLD: 1.2 RATIO — HIGH (ref 0.88–1.16)
LDH SERPL L TO P-CCNC: 563 U/L — HIGH (ref 135–225)
LDH SERPL L TO P-CCNC: 567 U/L — HIGH (ref 135–225)
LDH SERPL L TO P-CCNC: 743 U/L — HIGH (ref 135–225)
LEGIONELLA AG UR QL: NEGATIVE — SIGNIFICANT CHANGE UP
LYMPHOCYTES # BLD AUTO: 0.55 K/UL — LOW (ref 1–3.3)
LYMPHOCYTES # BLD AUTO: 0.83 K/UL — LOW (ref 1–3.3)
LYMPHOCYTES # BLD AUTO: 0.85 K/UL — LOW (ref 1–3.3)
LYMPHOCYTES # BLD AUTO: 0.91 K/UL — LOW (ref 1–3.3)
LYMPHOCYTES # BLD AUTO: 0.91 K/UL — LOW (ref 1–3.3)
LYMPHOCYTES # BLD AUTO: 1.08 K/UL — SIGNIFICANT CHANGE UP (ref 1–3.3)
LYMPHOCYTES # BLD AUTO: 1.14 K/UL — SIGNIFICANT CHANGE UP (ref 1–3.3)
LYMPHOCYTES # BLD AUTO: 1.22 K/UL — SIGNIFICANT CHANGE UP (ref 1–3.3)
LYMPHOCYTES # BLD AUTO: 1.25 K/UL — SIGNIFICANT CHANGE UP (ref 1–3.3)
LYMPHOCYTES # BLD AUTO: 1.26 K/UL — SIGNIFICANT CHANGE UP (ref 1–3.3)
LYMPHOCYTES # BLD AUTO: 1.26 K/UL — SIGNIFICANT CHANGE UP (ref 1–3.3)
LYMPHOCYTES # BLD AUTO: 1.32 K/UL — SIGNIFICANT CHANGE UP (ref 1–3.3)
LYMPHOCYTES # BLD AUTO: 1.34 K/UL — SIGNIFICANT CHANGE UP (ref 1–3.3)
LYMPHOCYTES # BLD AUTO: 1.46 K/UL — SIGNIFICANT CHANGE UP (ref 1–3.3)
LYMPHOCYTES # BLD AUTO: 1.73 K/UL — SIGNIFICANT CHANGE UP (ref 1–3.3)
LYMPHOCYTES # BLD AUTO: 1.75 K/UL — SIGNIFICANT CHANGE UP (ref 1–3.3)
LYMPHOCYTES # BLD AUTO: 1.8 K/UL — SIGNIFICANT CHANGE UP (ref 1–3.3)
LYMPHOCYTES # BLD AUTO: 1.9 K/UL — SIGNIFICANT CHANGE UP (ref 1–3.3)
LYMPHOCYTES # BLD AUTO: 10.3 % — LOW (ref 13–44)
LYMPHOCYTES # BLD AUTO: 11 % — LOW (ref 13–44)
LYMPHOCYTES # BLD AUTO: 12.4 % — LOW (ref 13–44)
LYMPHOCYTES # BLD AUTO: 12.7 % — LOW (ref 13–44)
LYMPHOCYTES # BLD AUTO: 12.8 % — LOW (ref 13–44)
LYMPHOCYTES # BLD AUTO: 13.5 % — SIGNIFICANT CHANGE UP (ref 13–44)
LYMPHOCYTES # BLD AUTO: 14.1 % — SIGNIFICANT CHANGE UP (ref 13–44)
LYMPHOCYTES # BLD AUTO: 14.3 % — SIGNIFICANT CHANGE UP (ref 13–44)
LYMPHOCYTES # BLD AUTO: 14.7 % — SIGNIFICANT CHANGE UP (ref 13–44)
LYMPHOCYTES # BLD AUTO: 14.7 % — SIGNIFICANT CHANGE UP (ref 13–44)
LYMPHOCYTES # BLD AUTO: 14.9 % — SIGNIFICANT CHANGE UP (ref 13–44)
LYMPHOCYTES # BLD AUTO: 15.2 % — SIGNIFICANT CHANGE UP (ref 13–44)
LYMPHOCYTES # BLD AUTO: 2.41 K/UL — SIGNIFICANT CHANGE UP (ref 1–3.3)
LYMPHOCYTES # BLD AUTO: 2.59 K/UL — SIGNIFICANT CHANGE UP (ref 1–3.3)
LYMPHOCYTES # BLD AUTO: 2.63 K/UL — SIGNIFICANT CHANGE UP (ref 1–3.3)
LYMPHOCYTES # BLD AUTO: 21.3 % — SIGNIFICANT CHANGE UP (ref 13–44)
LYMPHOCYTES # BLD AUTO: 25.4 % — SIGNIFICANT CHANGE UP (ref 13–44)
LYMPHOCYTES # BLD AUTO: 5.3 % — LOW (ref 13–44)
LYMPHOCYTES # BLD AUTO: 7.3 % — LOW (ref 13–44)
LYMPHOCYTES # BLD AUTO: 7.9 % — LOW (ref 13–44)
LYMPHOCYTES # BLD AUTO: 8.8 % — LOW (ref 13–44)
LYMPHOCYTES # BLD AUTO: 9.3 % — LOW (ref 13–44)
LYMPHOCYTES # BLD AUTO: 9.4 % — LOW (ref 13–44)
LYMPHOCYTES # BLD AUTO: 9.5 % — LOW (ref 13–44)
LYMPHOCYTES # SPEC AUTO: 1.7 % — HIGH (ref 0–0)
M PNEUMO DNA SPEC QL NAA+PROBE: SIGNIFICANT CHANGE UP
MACROCYTES BLD QL: SLIGHT — SIGNIFICANT CHANGE UP
MAGNESIUM SERPL-MCNC: 1.6 MG/DL — SIGNIFICANT CHANGE UP (ref 1.6–2.6)
MAGNESIUM SERPL-MCNC: 1.7 MG/DL — SIGNIFICANT CHANGE UP (ref 1.6–2.6)
MAGNESIUM SERPL-MCNC: 1.7 MG/DL — SIGNIFICANT CHANGE UP (ref 1.6–2.6)
MAGNESIUM SERPL-MCNC: 1.8 MG/DL — SIGNIFICANT CHANGE UP (ref 1.6–2.6)
MAGNESIUM SERPL-MCNC: 1.9 MG/DL — SIGNIFICANT CHANGE UP (ref 1.6–2.6)
MAGNESIUM SERPL-MCNC: 2 MG/DL — SIGNIFICANT CHANGE UP (ref 1.6–2.6)
MAGNESIUM SERPL-MCNC: 2.1 MG/DL — SIGNIFICANT CHANGE UP (ref 1.6–2.6)
MAGNESIUM SERPL-MCNC: 2.1 MG/DL — SIGNIFICANT CHANGE UP (ref 1.6–2.6)
MAGNESIUM SERPL-MCNC: 2.3 MG/DL — SIGNIFICANT CHANGE UP (ref 1.6–2.6)
MAGNESIUM SERPL-MCNC: 2.4 MG/DL — SIGNIFICANT CHANGE UP (ref 1.6–2.6)
MAGNESIUM SERPL-MCNC: 2.5 MG/DL — SIGNIFICANT CHANGE UP (ref 1.6–2.6)
MAGNESIUM SERPL-MCNC: 2.7 MG/DL — HIGH (ref 1.6–2.6)
MANUAL DIF COMMENT BLD-IMP: SIGNIFICANT CHANGE UP
MANUAL SMEAR VERIFICATION: SIGNIFICANT CHANGE UP
MANUAL SMEAR VERIFICATION: SIGNIFICANT CHANGE UP
MCHC RBC-ENTMCNC: 23.6 PG — LOW (ref 27–34)
MCHC RBC-ENTMCNC: 24.3 PG — LOW (ref 27–34)
MCHC RBC-ENTMCNC: 24.4 PG — LOW (ref 27–34)
MCHC RBC-ENTMCNC: 24.4 PG — LOW (ref 27–34)
MCHC RBC-ENTMCNC: 24.5 PG — LOW (ref 27–34)
MCHC RBC-ENTMCNC: 24.9 PG — LOW (ref 27–34)
MCHC RBC-ENTMCNC: 25 PG — LOW (ref 27–34)
MCHC RBC-ENTMCNC: 25 PG — LOW (ref 27–34)
MCHC RBC-ENTMCNC: 25.1 PG — LOW (ref 27–34)
MCHC RBC-ENTMCNC: 25.2 PG — LOW (ref 27–34)
MCHC RBC-ENTMCNC: 25.2 PG — LOW (ref 27–34)
MCHC RBC-ENTMCNC: 25.3 PG — LOW (ref 27–34)
MCHC RBC-ENTMCNC: 25.4 PG — LOW (ref 27–34)
MCHC RBC-ENTMCNC: 25.5 PG — LOW (ref 27–34)
MCHC RBC-ENTMCNC: 25.5 PG — LOW (ref 27–34)
MCHC RBC-ENTMCNC: 25.6 PG — LOW (ref 27–34)
MCHC RBC-ENTMCNC: 26.2 PG — LOW (ref 27–34)
MCHC RBC-ENTMCNC: 26.6 PG — LOW (ref 27–34)
MCHC RBC-ENTMCNC: 26.8 PG — LOW (ref 27–34)
MCHC RBC-ENTMCNC: 27 PG — SIGNIFICANT CHANGE UP (ref 27–34)
MCHC RBC-ENTMCNC: 27 PG — SIGNIFICANT CHANGE UP (ref 27–34)
MCHC RBC-ENTMCNC: 27.1 PG — SIGNIFICANT CHANGE UP (ref 27–34)
MCHC RBC-ENTMCNC: 27.2 PG — SIGNIFICANT CHANGE UP (ref 27–34)
MCHC RBC-ENTMCNC: 27.3 PG — SIGNIFICANT CHANGE UP (ref 27–34)
MCHC RBC-ENTMCNC: 28.8 GM/DL — LOW (ref 32–36)
MCHC RBC-ENTMCNC: 29.3 GM/DL — LOW (ref 32–36)
MCHC RBC-ENTMCNC: 29.5 GM/DL — LOW (ref 32–36)
MCHC RBC-ENTMCNC: 29.7 GM/DL — LOW (ref 32–36)
MCHC RBC-ENTMCNC: 29.9 GM/DL — LOW (ref 32–36)
MCHC RBC-ENTMCNC: 29.9 GM/DL — LOW (ref 32–36)
MCHC RBC-ENTMCNC: 30 GM/DL — LOW (ref 32–36)
MCHC RBC-ENTMCNC: 30 GM/DL — LOW (ref 32–36)
MCHC RBC-ENTMCNC: 30.1 GM/DL — LOW (ref 32–36)
MCHC RBC-ENTMCNC: 30.2 GM/DL — LOW (ref 32–36)
MCHC RBC-ENTMCNC: 30.3 GM/DL — LOW (ref 32–36)
MCHC RBC-ENTMCNC: 30.3 GM/DL — LOW (ref 32–36)
MCHC RBC-ENTMCNC: 30.4 GM/DL — LOW (ref 32–36)
MCHC RBC-ENTMCNC: 30.5 GM/DL — LOW (ref 32–36)
MCHC RBC-ENTMCNC: 30.6 GM/DL — LOW (ref 32–36)
MCHC RBC-ENTMCNC: 30.6 GM/DL — LOW (ref 32–36)
MCHC RBC-ENTMCNC: 30.7 GM/DL — LOW (ref 32–36)
MCHC RBC-ENTMCNC: 30.8 GM/DL — LOW (ref 32–36)
MCHC RBC-ENTMCNC: 31.4 GM/DL — LOW (ref 32–36)
MCHC RBC-ENTMCNC: 31.6 GM/DL — LOW (ref 32–36)
MCV RBC AUTO: 81.5 FL — SIGNIFICANT CHANGE UP (ref 80–100)
MCV RBC AUTO: 81.5 FL — SIGNIFICANT CHANGE UP (ref 80–100)
MCV RBC AUTO: 82.1 FL — SIGNIFICANT CHANGE UP (ref 80–100)
MCV RBC AUTO: 82.1 FL — SIGNIFICANT CHANGE UP (ref 80–100)
MCV RBC AUTO: 82.2 FL — SIGNIFICANT CHANGE UP (ref 80–100)
MCV RBC AUTO: 82.5 FL — SIGNIFICANT CHANGE UP (ref 80–100)
MCV RBC AUTO: 82.8 FL — SIGNIFICANT CHANGE UP (ref 80–100)
MCV RBC AUTO: 82.9 FL — SIGNIFICANT CHANGE UP (ref 80–100)
MCV RBC AUTO: 83.2 FL — SIGNIFICANT CHANGE UP (ref 80–100)
MCV RBC AUTO: 83.3 FL — SIGNIFICANT CHANGE UP (ref 80–100)
MCV RBC AUTO: 83.3 FL — SIGNIFICANT CHANGE UP (ref 80–100)
MCV RBC AUTO: 83.8 FL — SIGNIFICANT CHANGE UP (ref 80–100)
MCV RBC AUTO: 83.9 FL — SIGNIFICANT CHANGE UP (ref 80–100)
MCV RBC AUTO: 84.2 FL — SIGNIFICANT CHANGE UP (ref 80–100)
MCV RBC AUTO: 84.3 FL — SIGNIFICANT CHANGE UP (ref 80–100)
MCV RBC AUTO: 84.7 FL — SIGNIFICANT CHANGE UP (ref 80–100)
MCV RBC AUTO: 84.9 FL — SIGNIFICANT CHANGE UP (ref 80–100)
MCV RBC AUTO: 86 FL — SIGNIFICANT CHANGE UP (ref 80–100)
MCV RBC AUTO: 86.2 FL — SIGNIFICANT CHANGE UP (ref 80–100)
MCV RBC AUTO: 86.7 FL — SIGNIFICANT CHANGE UP (ref 80–100)
MCV RBC AUTO: 88.6 FL — SIGNIFICANT CHANGE UP (ref 80–100)
MCV RBC AUTO: 88.8 FL — SIGNIFICANT CHANGE UP (ref 80–100)
MCV RBC AUTO: 90.4 FL — SIGNIFICANT CHANGE UP (ref 80–100)
MCV RBC AUTO: 91 FL — SIGNIFICANT CHANGE UP (ref 80–100)
METAMYELOCYTES # FLD: 0.9 % — SIGNIFICANT CHANGE UP (ref 0–1)
METHGB MFR BLDA: 0 % — SIGNIFICANT CHANGE UP
METHGB MFR BLDA: 0.2 % — SIGNIFICANT CHANGE UP
METHGB MFR BLDA: 0.6 % — SIGNIFICANT CHANGE UP
METHGB MFR BLDA: 0.7 % — SIGNIFICANT CHANGE UP
METHGB MFR BLDA: 0.9 % — SIGNIFICANT CHANGE UP
METHGB MFR BLDA: 1 % — SIGNIFICANT CHANGE UP
METHGB MFR BLDA: 1 % — SIGNIFICANT CHANGE UP
METHGB MFR BLDA: 1.1 % — SIGNIFICANT CHANGE UP
MICROCYTES BLD QL: SIGNIFICANT CHANGE UP
MONOCYTES # BLD AUTO: 0.34 K/UL — SIGNIFICANT CHANGE UP (ref 0–0.9)
MONOCYTES # BLD AUTO: 0.43 K/UL — SIGNIFICANT CHANGE UP (ref 0–0.9)
MONOCYTES # BLD AUTO: 0.45 K/UL — SIGNIFICANT CHANGE UP (ref 0–0.9)
MONOCYTES # BLD AUTO: 0.46 K/UL — SIGNIFICANT CHANGE UP (ref 0–0.9)
MONOCYTES # BLD AUTO: 0.46 K/UL — SIGNIFICANT CHANGE UP (ref 0–0.9)
MONOCYTES # BLD AUTO: 0.47 K/UL — SIGNIFICANT CHANGE UP (ref 0–0.9)
MONOCYTES # BLD AUTO: 0.47 K/UL — SIGNIFICANT CHANGE UP (ref 0–0.9)
MONOCYTES # BLD AUTO: 0.48 K/UL — SIGNIFICANT CHANGE UP (ref 0–0.9)
MONOCYTES # BLD AUTO: 0.52 K/UL — SIGNIFICANT CHANGE UP (ref 0–0.9)
MONOCYTES # BLD AUTO: 0.52 K/UL — SIGNIFICANT CHANGE UP (ref 0–0.9)
MONOCYTES # BLD AUTO: 0.56 K/UL — SIGNIFICANT CHANGE UP (ref 0–0.9)
MONOCYTES # BLD AUTO: 0.57 K/UL — SIGNIFICANT CHANGE UP (ref 0–0.9)
MONOCYTES # BLD AUTO: 0.61 K/UL — SIGNIFICANT CHANGE UP (ref 0–0.9)
MONOCYTES # BLD AUTO: 0.61 K/UL — SIGNIFICANT CHANGE UP (ref 0–0.9)
MONOCYTES # BLD AUTO: 0.62 K/UL — SIGNIFICANT CHANGE UP (ref 0–0.9)
MONOCYTES # BLD AUTO: 0.63 K/UL — SIGNIFICANT CHANGE UP (ref 0–0.9)
MONOCYTES # BLD AUTO: 0.72 K/UL — SIGNIFICANT CHANGE UP (ref 0–0.9)
MONOCYTES # BLD AUTO: 0.73 K/UL — SIGNIFICANT CHANGE UP (ref 0–0.9)
MONOCYTES # BLD AUTO: 0.78 K/UL — SIGNIFICANT CHANGE UP (ref 0–0.9)
MONOCYTES # BLD AUTO: 0.96 K/UL — HIGH (ref 0–0.9)
MONOCYTES # BLD AUTO: 1.59 K/UL — HIGH (ref 0–0.9)
MONOCYTES NFR BLD AUTO: 3.1 % — SIGNIFICANT CHANGE UP (ref 2–14)
MONOCYTES NFR BLD AUTO: 3.3 % — SIGNIFICANT CHANGE UP (ref 2–14)
MONOCYTES NFR BLD AUTO: 3.5 % — SIGNIFICANT CHANGE UP (ref 2–14)
MONOCYTES NFR BLD AUTO: 4.3 % — SIGNIFICANT CHANGE UP (ref 2–14)
MONOCYTES NFR BLD AUTO: 4.4 % — SIGNIFICANT CHANGE UP (ref 2–14)
MONOCYTES NFR BLD AUTO: 5.1 % — SIGNIFICANT CHANGE UP (ref 2–14)
MONOCYTES NFR BLD AUTO: 5.2 % — SIGNIFICANT CHANGE UP (ref 2–14)
MONOCYTES NFR BLD AUTO: 5.2 % — SIGNIFICANT CHANGE UP (ref 2–14)
MONOCYTES NFR BLD AUTO: 5.3 % — SIGNIFICANT CHANGE UP (ref 2–14)
MONOCYTES NFR BLD AUTO: 5.5 % — SIGNIFICANT CHANGE UP (ref 2–14)
MONOCYTES NFR BLD AUTO: 5.8 % — SIGNIFICANT CHANGE UP (ref 2–14)
MONOCYTES NFR BLD AUTO: 5.9 % — SIGNIFICANT CHANGE UP (ref 2–14)
MONOCYTES NFR BLD AUTO: 6 % — SIGNIFICANT CHANGE UP (ref 2–14)
MONOCYTES NFR BLD AUTO: 6 % — SIGNIFICANT CHANGE UP (ref 2–14)
MONOCYTES NFR BLD AUTO: 6.1 % — SIGNIFICANT CHANGE UP (ref 2–14)
MONOCYTES NFR BLD AUTO: 7 % — SIGNIFICANT CHANGE UP (ref 2–14)
MONOCYTES NFR BLD AUTO: 7.1 % — SIGNIFICANT CHANGE UP (ref 2–14)
MRSA PCR RESULT.: DETECTED
MYELOCYTES NFR BLD: 1.7 % — HIGH (ref 0–0)
MYELOCYTES NFR BLD: 4.4 % — HIGH (ref 0–0)
NEUTROPHILS # BLD AUTO: 11.27 K/UL — HIGH (ref 1.8–7.4)
NEUTROPHILS # BLD AUTO: 13.03 K/UL — HIGH (ref 1.8–7.4)
NEUTROPHILS # BLD AUTO: 14.61 K/UL — HIGH (ref 1.8–7.4)
NEUTROPHILS # BLD AUTO: 15.43 K/UL — HIGH (ref 1.8–7.4)
NEUTROPHILS # BLD AUTO: 24.42 K/UL — HIGH (ref 1.8–7.4)
NEUTROPHILS # BLD AUTO: 5.62 K/UL — SIGNIFICANT CHANGE UP (ref 1.8–7.4)
NEUTROPHILS # BLD AUTO: 5.66 K/UL — SIGNIFICANT CHANGE UP (ref 1.8–7.4)
NEUTROPHILS # BLD AUTO: 6.17 K/UL — SIGNIFICANT CHANGE UP (ref 1.8–7.4)
NEUTROPHILS # BLD AUTO: 6.34 K/UL — SIGNIFICANT CHANGE UP (ref 1.8–7.4)
NEUTROPHILS # BLD AUTO: 6.34 K/UL — SIGNIFICANT CHANGE UP (ref 1.8–7.4)
NEUTROPHILS # BLD AUTO: 6.41 K/UL — SIGNIFICANT CHANGE UP (ref 1.8–7.4)
NEUTROPHILS # BLD AUTO: 6.44 K/UL — SIGNIFICANT CHANGE UP (ref 1.8–7.4)
NEUTROPHILS # BLD AUTO: 6.96 K/UL — SIGNIFICANT CHANGE UP (ref 1.8–7.4)
NEUTROPHILS # BLD AUTO: 7.1 K/UL — SIGNIFICANT CHANGE UP (ref 1.8–7.4)
NEUTROPHILS # BLD AUTO: 7.17 K/UL — SIGNIFICANT CHANGE UP (ref 1.8–7.4)
NEUTROPHILS # BLD AUTO: 7.74 K/UL — HIGH (ref 1.8–7.4)
NEUTROPHILS # BLD AUTO: 8.08 K/UL — HIGH (ref 1.8–7.4)
NEUTROPHILS # BLD AUTO: 8.09 K/UL — HIGH (ref 1.8–7.4)
NEUTROPHILS # BLD AUTO: 8.14 K/UL — HIGH (ref 1.8–7.4)
NEUTROPHILS # BLD AUTO: 8.44 K/UL — HIGH (ref 1.8–7.4)
NEUTROPHILS # BLD AUTO: 9.16 K/UL — HIGH (ref 1.8–7.4)
NEUTROPHILS NFR BLD AUTO: 60.4 % — SIGNIFICANT CHANGE UP (ref 43–77)
NEUTROPHILS NFR BLD AUTO: 68.4 % — SIGNIFICANT CHANGE UP (ref 43–77)
NEUTROPHILS NFR BLD AUTO: 69.3 % — SIGNIFICANT CHANGE UP (ref 43–77)
NEUTROPHILS NFR BLD AUTO: 71.9 % — SIGNIFICANT CHANGE UP (ref 43–77)
NEUTROPHILS NFR BLD AUTO: 72.3 % — SIGNIFICANT CHANGE UP (ref 43–77)
NEUTROPHILS NFR BLD AUTO: 73.2 % — SIGNIFICANT CHANGE UP (ref 43–77)
NEUTROPHILS NFR BLD AUTO: 73.2 % — SIGNIFICANT CHANGE UP (ref 43–77)
NEUTROPHILS NFR BLD AUTO: 74 % — SIGNIFICANT CHANGE UP (ref 43–77)
NEUTROPHILS NFR BLD AUTO: 74.5 % — SIGNIFICANT CHANGE UP (ref 43–77)
NEUTROPHILS NFR BLD AUTO: 74.9 % — SIGNIFICANT CHANGE UP (ref 43–77)
NEUTROPHILS NFR BLD AUTO: 75 % — SIGNIFICANT CHANGE UP (ref 43–77)
NEUTROPHILS NFR BLD AUTO: 76.7 % — SIGNIFICANT CHANGE UP (ref 43–77)
NEUTROPHILS NFR BLD AUTO: 76.9 % — SIGNIFICANT CHANGE UP (ref 43–77)
NEUTROPHILS NFR BLD AUTO: 77.2 % — HIGH (ref 43–77)
NEUTROPHILS NFR BLD AUTO: 77.6 % — HIGH (ref 43–77)
NEUTROPHILS NFR BLD AUTO: 77.9 % — HIGH (ref 43–77)
NEUTROPHILS NFR BLD AUTO: 78 % — HIGH (ref 43–77)
NEUTROPHILS NFR BLD AUTO: 79.3 % — HIGH (ref 43–77)
NEUTROPHILS NFR BLD AUTO: 82.5 % — HIGH (ref 43–77)
NEUTROPHILS NFR BLD AUTO: 83.5 % — HIGH (ref 43–77)
NEUTROPHILS NFR BLD AUTO: 83.5 % — HIGH (ref 43–77)
NEUTS BAND # BLD: 4.4 % — SIGNIFICANT CHANGE UP (ref 0–6)
NRBC # BLD: 0 /100 WBCS — SIGNIFICANT CHANGE UP (ref 0–0)
NRBC # BLD: 2 /100 WBCS — HIGH (ref 0–0)
NRBC # BLD: 3 /100 WBCS — HIGH (ref 0–0)
NRBC # BLD: 3 /100 — HIGH (ref 0–0)
NRBC # BLD: 4 /100 WBCS — HIGH (ref 0–0)
NRBC # BLD: 5 /100 WBCS — HIGH (ref 0–0)
NRBC # BLD: 5 /100 WBCS — HIGH (ref 0–0)
NRBC # BLD: 7 /100 — HIGH (ref 0–0)
NRBC # FLD: 0.06 K/UL — HIGH (ref 0–0)
NRBC # FLD: 0.12 K/UL — HIGH (ref 0–0)
NRBC # FLD: 0.13 K/UL — HIGH (ref 0–0)
NRBC # FLD: 0.13 K/UL — HIGH (ref 0–0)
NRBC # FLD: 0.17 K/UL — HIGH (ref 0–0)
NRBC # FLD: 0.19 K/UL — HIGH (ref 0–0)
NRBC # FLD: 0.2 K/UL — HIGH (ref 0–0)
NRBC # FLD: 0.25 K/UL — HIGH (ref 0–0)
NRBC # FLD: 0.27 K/UL — HIGH (ref 0–0)
NRBC # FLD: 0.28 K/UL — HIGH (ref 0–0)
NRBC # FLD: 0.31 K/UL — HIGH (ref 0–0)
NRBC # FLD: 0.35 K/UL — HIGH (ref 0–0)
NRBC # FLD: 0.36 K/UL — HIGH (ref 0–0)
NRBC # FLD: 0.37 K/UL — HIGH (ref 0–0)
NRBC # FLD: 0.4 K/UL — HIGH (ref 0–0)
NRBC # FLD: 0.46 K/UL — HIGH (ref 0–0)
NRBC # FLD: 0.46 K/UL — HIGH (ref 0–0)
NRBC # FLD: 0.62 K/UL — HIGH (ref 0–0)
NRBC # FLD: 1.17 K/UL — HIGH (ref 0–0)
NT-PROBNP SERPL-SCNC: HIGH PG/ML
OB PNL STL: POSITIVE
OSMOLALITY UR: 389 MOSM/KG — SIGNIFICANT CHANGE UP (ref 50–1200)
OSMOLALITY UR: 525 MOSM/KG — SIGNIFICANT CHANGE UP (ref 50–1200)
OVALOCYTES BLD QL SMEAR: SLIGHT — SIGNIFICANT CHANGE UP
OVALOCYTES BLD QL SMEAR: SLIGHT — SIGNIFICANT CHANGE UP
OXYHGB MFR BLDA: 87.2 % — LOW (ref 90–95)
OXYHGB MFR BLDA: 93.5 % — SIGNIFICANT CHANGE UP (ref 90–95)
OXYHGB MFR BLDA: 93.8 % — SIGNIFICANT CHANGE UP (ref 90–95)
OXYHGB MFR BLDA: 94 % — SIGNIFICANT CHANGE UP (ref 90–95)
OXYHGB MFR BLDA: 94.2 % — SIGNIFICANT CHANGE UP (ref 90–95)
OXYHGB MFR BLDA: 95.4 % — HIGH (ref 90–95)
OXYHGB MFR BLDA: 96 % — HIGH (ref 90–95)
OXYHGB MFR BLDA: 96.8 % — HIGH (ref 90–95)
P-ANCA SER-ACNC: NEGATIVE — SIGNIFICANT CHANGE UP
PCO2 BLDA: 42 MMHG — SIGNIFICANT CHANGE UP (ref 32–45)
PCO2 BLDA: 42 MMHG — SIGNIFICANT CHANGE UP (ref 32–45)
PCO2 BLDA: 45 MMHG — SIGNIFICANT CHANGE UP (ref 32–45)
PCO2 BLDA: 47 MMHG — HIGH (ref 32–45)
PCO2 BLDA: 48 MMHG — HIGH (ref 32–45)
PCO2 BLDA: 48 MMHG — HIGH (ref 32–45)
PCO2 BLDA: 50 MMHG — HIGH (ref 32–45)
PCO2 BLDA: 50 MMHG — HIGH (ref 32–45)
PH BLDA: 7.4 — SIGNIFICANT CHANGE UP (ref 7.35–7.45)
PH BLDA: 7.43 — SIGNIFICANT CHANGE UP (ref 7.35–7.45)
PH BLDA: 7.43 — SIGNIFICANT CHANGE UP (ref 7.35–7.45)
PH BLDA: 7.45 — SIGNIFICANT CHANGE UP (ref 7.35–7.45)
PH BLDA: 7.47 — HIGH (ref 7.35–7.45)
PH BLDA: 7.48 — HIGH (ref 7.35–7.45)
PHOSPHATE SERPL-MCNC: 2.2 MG/DL — LOW (ref 2.5–4.5)
PHOSPHATE SERPL-MCNC: 2.9 MG/DL — SIGNIFICANT CHANGE UP (ref 2.5–4.5)
PHOSPHATE SERPL-MCNC: 3 MG/DL — SIGNIFICANT CHANGE UP (ref 2.5–4.5)
PHOSPHATE SERPL-MCNC: 3.1 MG/DL — SIGNIFICANT CHANGE UP (ref 2.5–4.5)
PHOSPHATE SERPL-MCNC: 3.2 MG/DL — SIGNIFICANT CHANGE UP (ref 2.5–4.5)
PHOSPHATE SERPL-MCNC: 3.2 MG/DL — SIGNIFICANT CHANGE UP (ref 2.5–4.5)
PHOSPHATE SERPL-MCNC: 3.4 MG/DL — SIGNIFICANT CHANGE UP (ref 2.5–4.5)
PHOSPHATE SERPL-MCNC: 3.4 MG/DL — SIGNIFICANT CHANGE UP (ref 2.5–4.5)
PHOSPHATE SERPL-MCNC: 3.5 MG/DL — SIGNIFICANT CHANGE UP (ref 2.5–4.5)
PHOSPHATE SERPL-MCNC: 3.6 MG/DL — SIGNIFICANT CHANGE UP (ref 2.5–4.5)
PHOSPHATE SERPL-MCNC: 3.6 MG/DL — SIGNIFICANT CHANGE UP (ref 2.5–4.5)
PHOSPHATE SERPL-MCNC: 3.7 MG/DL — SIGNIFICANT CHANGE UP (ref 2.5–4.5)
PHOSPHATE SERPL-MCNC: 3.7 MG/DL — SIGNIFICANT CHANGE UP (ref 2.5–4.5)
PHOSPHATE SERPL-MCNC: 3.8 MG/DL — SIGNIFICANT CHANGE UP (ref 2.5–4.5)
PLAT MORPH BLD: NORMAL — SIGNIFICANT CHANGE UP
PLAT MORPH BLD: NORMAL — SIGNIFICANT CHANGE UP
PLATELET # BLD AUTO: 185 K/UL — SIGNIFICANT CHANGE UP (ref 150–400)
PLATELET # BLD AUTO: 186 K/UL — SIGNIFICANT CHANGE UP (ref 150–400)
PLATELET # BLD AUTO: 189 K/UL — SIGNIFICANT CHANGE UP (ref 150–400)
PLATELET # BLD AUTO: 197 K/UL — SIGNIFICANT CHANGE UP (ref 150–400)
PLATELET # BLD AUTO: 200 K/UL — SIGNIFICANT CHANGE UP (ref 150–400)
PLATELET # BLD AUTO: 202 K/UL — SIGNIFICANT CHANGE UP (ref 150–400)
PLATELET # BLD AUTO: 206 K/UL — SIGNIFICANT CHANGE UP (ref 150–400)
PLATELET # BLD AUTO: 212 K/UL — SIGNIFICANT CHANGE UP (ref 150–400)
PLATELET # BLD AUTO: 212 K/UL — SIGNIFICANT CHANGE UP (ref 150–400)
PLATELET # BLD AUTO: 216 K/UL — SIGNIFICANT CHANGE UP (ref 150–400)
PLATELET # BLD AUTO: 217 K/UL — SIGNIFICANT CHANGE UP (ref 150–400)
PLATELET # BLD AUTO: 218 K/UL — SIGNIFICANT CHANGE UP (ref 150–400)
PLATELET # BLD AUTO: 220 K/UL — SIGNIFICANT CHANGE UP (ref 150–400)
PLATELET # BLD AUTO: 250 K/UL — SIGNIFICANT CHANGE UP (ref 150–400)
PLATELET # BLD AUTO: 255 K/UL — SIGNIFICANT CHANGE UP (ref 150–400)
PLATELET # BLD AUTO: 256 K/UL — SIGNIFICANT CHANGE UP (ref 150–400)
PLATELET # BLD AUTO: 262 K/UL — SIGNIFICANT CHANGE UP (ref 150–400)
PLATELET # BLD AUTO: 263 K/UL — SIGNIFICANT CHANGE UP (ref 150–400)
PLATELET # BLD AUTO: 264 K/UL — SIGNIFICANT CHANGE UP (ref 150–400)
PLATELET # BLD AUTO: 273 K/UL — SIGNIFICANT CHANGE UP (ref 150–400)
PLATELET # BLD AUTO: 305 K/UL — SIGNIFICANT CHANGE UP (ref 150–400)
PLATELET # BLD AUTO: 313 K/UL — SIGNIFICANT CHANGE UP (ref 150–400)
PLATELET # BLD AUTO: 377 K/UL — SIGNIFICANT CHANGE UP (ref 150–400)
PLATELET # BLD AUTO: 407 K/UL — HIGH (ref 150–400)
PLATELET COUNT - ESTIMATE: NORMAL — SIGNIFICANT CHANGE UP
PLATELET COUNT - ESTIMATE: NORMAL — SIGNIFICANT CHANGE UP
PO2 BLDA: 133 MMHG — HIGH (ref 83–108)
PO2 BLDA: 139 MMHG — HIGH (ref 83–108)
PO2 BLDA: 204 MMHG — HIGH (ref 83–108)
PO2 BLDA: 58 MMHG — LOW (ref 83–108)
PO2 BLDA: 80 MMHG — LOW (ref 83–108)
PO2 BLDA: 83 MMHG — SIGNIFICANT CHANGE UP (ref 83–108)
PO2 BLDA: 85 MMHG — SIGNIFICANT CHANGE UP (ref 83–108)
PO2 BLDA: 89 MMHG — SIGNIFICANT CHANGE UP (ref 83–108)
POIKILOCYTOSIS BLD QL AUTO: SIGNIFICANT CHANGE UP
POIKILOCYTOSIS BLD QL AUTO: SLIGHT — SIGNIFICANT CHANGE UP
POLYCHROMASIA BLD QL SMEAR: SLIGHT — SIGNIFICANT CHANGE UP
POLYCHROMASIA BLD QL SMEAR: SLIGHT — SIGNIFICANT CHANGE UP
POTASSIUM SERPL-MCNC: 3.4 MMOL/L — LOW (ref 3.5–5.3)
POTASSIUM SERPL-MCNC: 3.6 MMOL/L — SIGNIFICANT CHANGE UP (ref 3.5–5.3)
POTASSIUM SERPL-MCNC: 3.7 MMOL/L — SIGNIFICANT CHANGE UP (ref 3.5–5.3)
POTASSIUM SERPL-MCNC: 3.8 MMOL/L — SIGNIFICANT CHANGE UP (ref 3.5–5.3)
POTASSIUM SERPL-MCNC: 3.8 MMOL/L — SIGNIFICANT CHANGE UP (ref 3.5–5.3)
POTASSIUM SERPL-MCNC: 3.9 MMOL/L — SIGNIFICANT CHANGE UP (ref 3.5–5.3)
POTASSIUM SERPL-MCNC: 4 MMOL/L — SIGNIFICANT CHANGE UP (ref 3.5–5.3)
POTASSIUM SERPL-MCNC: 4.1 MMOL/L — SIGNIFICANT CHANGE UP (ref 3.5–5.3)
POTASSIUM SERPL-MCNC: 4.3 MMOL/L — SIGNIFICANT CHANGE UP (ref 3.5–5.3)
POTASSIUM SERPL-MCNC: 4.3 MMOL/L — SIGNIFICANT CHANGE UP (ref 3.5–5.3)
POTASSIUM SERPL-MCNC: 4.4 MMOL/L — SIGNIFICANT CHANGE UP (ref 3.5–5.3)
POTASSIUM SERPL-MCNC: 4.4 MMOL/L — SIGNIFICANT CHANGE UP (ref 3.5–5.3)
POTASSIUM SERPL-MCNC: 4.5 MMOL/L — SIGNIFICANT CHANGE UP (ref 3.5–5.3)
POTASSIUM SERPL-MCNC: 4.5 MMOL/L — SIGNIFICANT CHANGE UP (ref 3.5–5.3)
POTASSIUM SERPL-MCNC: 4.8 MMOL/L — SIGNIFICANT CHANGE UP (ref 3.5–5.3)
POTASSIUM SERPL-MCNC: 4.9 MMOL/L — SIGNIFICANT CHANGE UP (ref 3.5–5.3)
POTASSIUM SERPL-MCNC: 5.4 MMOL/L — HIGH (ref 3.5–5.3)
POTASSIUM SERPL-SCNC: 3.4 MMOL/L — LOW (ref 3.5–5.3)
POTASSIUM SERPL-SCNC: 3.6 MMOL/L — SIGNIFICANT CHANGE UP (ref 3.5–5.3)
POTASSIUM SERPL-SCNC: 3.7 MMOL/L — SIGNIFICANT CHANGE UP (ref 3.5–5.3)
POTASSIUM SERPL-SCNC: 3.8 MMOL/L — SIGNIFICANT CHANGE UP (ref 3.5–5.3)
POTASSIUM SERPL-SCNC: 3.8 MMOL/L — SIGNIFICANT CHANGE UP (ref 3.5–5.3)
POTASSIUM SERPL-SCNC: 3.9 MMOL/L — SIGNIFICANT CHANGE UP (ref 3.5–5.3)
POTASSIUM SERPL-SCNC: 4 MMOL/L — SIGNIFICANT CHANGE UP (ref 3.5–5.3)
POTASSIUM SERPL-SCNC: 4.1 MMOL/L — SIGNIFICANT CHANGE UP (ref 3.5–5.3)
POTASSIUM SERPL-SCNC: 4.3 MMOL/L — SIGNIFICANT CHANGE UP (ref 3.5–5.3)
POTASSIUM SERPL-SCNC: 4.3 MMOL/L — SIGNIFICANT CHANGE UP (ref 3.5–5.3)
POTASSIUM SERPL-SCNC: 4.4 MMOL/L — SIGNIFICANT CHANGE UP (ref 3.5–5.3)
POTASSIUM SERPL-SCNC: 4.4 MMOL/L — SIGNIFICANT CHANGE UP (ref 3.5–5.3)
POTASSIUM SERPL-SCNC: 4.5 MMOL/L — SIGNIFICANT CHANGE UP (ref 3.5–5.3)
POTASSIUM SERPL-SCNC: 4.5 MMOL/L — SIGNIFICANT CHANGE UP (ref 3.5–5.3)
POTASSIUM SERPL-SCNC: 4.8 MMOL/L — SIGNIFICANT CHANGE UP (ref 3.5–5.3)
POTASSIUM SERPL-SCNC: 4.9 MMOL/L — SIGNIFICANT CHANGE UP (ref 3.5–5.3)
POTASSIUM SERPL-SCNC: 5.4 MMOL/L — HIGH (ref 3.5–5.3)
PROT SERPL-MCNC: 5.5 G/DL — LOW (ref 6–8.3)
PROT SERPL-MCNC: 5.6 G/DL — LOW (ref 6–8.3)
PROT SERPL-MCNC: 5.7 G/DL — LOW (ref 6–8.3)
PROT SERPL-MCNC: 5.8 G/DL — LOW (ref 6–8.3)
PROT SERPL-MCNC: 5.9 G/DL — LOW (ref 6–8.3)
PROT SERPL-MCNC: 5.9 G/DL — LOW (ref 6–8.3)
PROT SERPL-MCNC: 6 G/DL — SIGNIFICANT CHANGE UP (ref 6–8.3)
PROT SERPL-MCNC: 6.1 G/DL — SIGNIFICANT CHANGE UP (ref 6–8.3)
PROT SERPL-MCNC: 6.2 G/DL — SIGNIFICANT CHANGE UP (ref 6–8.3)
PROT SERPL-MCNC: 6.3 G/DL — SIGNIFICANT CHANGE UP (ref 6–8.3)
PROT SERPL-MCNC: 6.4 G/DL — SIGNIFICANT CHANGE UP (ref 6–8.3)
PROT SERPL-MCNC: 6.6 G/DL — SIGNIFICANT CHANGE UP (ref 6–8.3)
PROT SERPL-MCNC: 6.6 G/DL — SIGNIFICANT CHANGE UP (ref 6–8.3)
PROTHROM AB SERPL-ACNC: 12.5 SEC — SIGNIFICANT CHANGE UP (ref 10.5–13.4)
PROTHROM AB SERPL-ACNC: 12.9 SEC — SIGNIFICANT CHANGE UP (ref 10.5–13.4)
PROTHROM AB SERPL-ACNC: 13 SEC — SIGNIFICANT CHANGE UP (ref 10.5–13.4)
PROTHROM AB SERPL-ACNC: 13.9 SEC — HIGH (ref 10.5–13.4)
RAPID RVP RESULT: SIGNIFICANT CHANGE UP
RBC # BLD: 2.68 M/UL — LOW (ref 3.8–5.2)
RBC # BLD: 2.73 M/UL — LOW (ref 3.8–5.2)
RBC # BLD: 2.78 M/UL — LOW (ref 3.8–5.2)
RBC # BLD: 2.8 M/UL — LOW (ref 3.8–5.2)
RBC # BLD: 2.81 M/UL — LOW (ref 3.8–5.2)
RBC # BLD: 2.85 M/UL — LOW (ref 3.8–5.2)
RBC # BLD: 2.9 M/UL — LOW (ref 3.8–5.2)
RBC # BLD: 2.92 M/UL — LOW (ref 3.8–5.2)
RBC # BLD: 2.93 M/UL — LOW (ref 3.8–5.2)
RBC # BLD: 2.95 M/UL — LOW (ref 3.8–5.2)
RBC # BLD: 2.98 M/UL — LOW (ref 3.8–5.2)
RBC # BLD: 3 M/UL — LOW (ref 3.8–5.2)
RBC # BLD: 3.04 M/UL — LOW (ref 3.8–5.2)
RBC # BLD: 3.09 M/UL — LOW (ref 3.8–5.2)
RBC # BLD: 3.12 M/UL — LOW (ref 3.8–5.2)
RBC # BLD: 3.22 M/UL — LOW (ref 3.8–5.2)
RBC # BLD: 3.26 M/UL — LOW (ref 3.8–5.2)
RBC # BLD: 3.3 M/UL — LOW (ref 3.8–5.2)
RBC # BLD: 3.34 M/UL — LOW (ref 3.8–5.2)
RBC # BLD: 3.37 M/UL — LOW (ref 3.8–5.2)
RBC # BLD: 3.4 M/UL — LOW (ref 3.8–5.2)
RBC # BLD: 3.4 M/UL — LOW (ref 3.8–5.2)
RBC # BLD: 3.41 M/UL — LOW (ref 3.8–5.2)
RBC # BLD: 3.52 M/UL — LOW (ref 3.8–5.2)
RBC # FLD: 18.8 % — HIGH (ref 10.3–14.5)
RBC # FLD: 19.3 % — HIGH (ref 10.3–14.5)
RBC # FLD: 19.7 % — HIGH (ref 10.3–14.5)
RBC # FLD: 19.7 % — HIGH (ref 10.3–14.5)
RBC # FLD: 19.9 % — HIGH (ref 10.3–14.5)
RBC # FLD: 20 % — HIGH (ref 10.3–14.5)
RBC # FLD: 20.2 % — HIGH (ref 10.3–14.5)
RBC # FLD: 20.3 % — HIGH (ref 10.3–14.5)
RBC # FLD: 21 % — HIGH (ref 10.3–14.5)
RBC # FLD: 21.2 % — HIGH (ref 10.3–14.5)
RBC # FLD: 21.2 % — HIGH (ref 10.3–14.5)
RBC # FLD: 21.3 % — HIGH (ref 10.3–14.5)
RBC # FLD: 21.7 % — HIGH (ref 10.3–14.5)
RBC # FLD: 21.9 % — HIGH (ref 10.3–14.5)
RBC # FLD: 22 % — HIGH (ref 10.3–14.5)
RBC # FLD: 22.4 % — HIGH (ref 10.3–14.5)
RBC # FLD: 22.4 % — HIGH (ref 10.3–14.5)
RBC # FLD: 22.7 % — HIGH (ref 10.3–14.5)
RBC # FLD: 23 % — HIGH (ref 10.3–14.5)
RBC # FLD: 23.3 % — HIGH (ref 10.3–14.5)
RBC # FLD: 23.7 % — HIGH (ref 10.3–14.5)
RBC # FLD: 24.2 % — HIGH (ref 10.3–14.5)
RBC # FLD: 24.6 % — HIGH (ref 10.3–14.5)
RBC # FLD: 24.8 % — HIGH (ref 10.3–14.5)
RBC BLD AUTO: ABNORMAL
RBC BLD AUTO: NORMAL — SIGNIFICANT CHANGE UP
RH IG SCN BLD-IMP: POSITIVE — SIGNIFICANT CHANGE UP
RHEUMATOID FACT SERPL-ACNC: 12 IU/ML — SIGNIFICANT CHANGE UP (ref 0–13)
RSV RNA SPEC QL NAA+PROBE: SIGNIFICANT CHANGE UP
RV+EV RNA SPEC QL NAA+PROBE: SIGNIFICANT CHANGE UP
S AUREUS DNA NOSE QL NAA+PROBE: DETECTED
S PNEUM AG UR QL: NEGATIVE — SIGNIFICANT CHANGE UP
SAO2 % BLDA: 90.2 % — LOW (ref 94–98)
SAO2 % BLDA: 96.7 % — SIGNIFICANT CHANGE UP (ref 94–98)
SAO2 % BLDA: 96.9 % — SIGNIFICANT CHANGE UP (ref 94–98)
SAO2 % BLDA: 96.9 % — SIGNIFICANT CHANGE UP (ref 94–98)
SAO2 % BLDA: 97.3 % — SIGNIFICANT CHANGE UP (ref 94–98)
SAO2 % BLDA: 98.5 % — HIGH (ref 94–98)
SAO2 % BLDA: 98.9 % — HIGH (ref 94–98)
SAO2 % BLDA: 99.1 % — HIGH (ref 94–98)
SARS-COV-2 RNA SPEC QL NAA+PROBE: SIGNIFICANT CHANGE UP
SODIUM SERPL-SCNC: 130 MMOL/L — LOW (ref 135–145)
SODIUM SERPL-SCNC: 131 MMOL/L — LOW (ref 135–145)
SODIUM SERPL-SCNC: 132 MMOL/L — LOW (ref 135–145)
SODIUM SERPL-SCNC: 133 MMOL/L — LOW (ref 135–145)
SODIUM SERPL-SCNC: 134 MMOL/L — LOW (ref 135–145)
SODIUM SERPL-SCNC: 135 MMOL/L — SIGNIFICANT CHANGE UP (ref 135–145)
SODIUM SERPL-SCNC: 136 MMOL/L — SIGNIFICANT CHANGE UP (ref 135–145)
SODIUM SERPL-SCNC: 137 MMOL/L — SIGNIFICANT CHANGE UP (ref 135–145)
SODIUM SERPL-SCNC: 137 MMOL/L — SIGNIFICANT CHANGE UP (ref 135–145)
SODIUM UR-SCNC: 48 MMOL/L — SIGNIFICANT CHANGE UP
SODIUM UR-SCNC: 94 MMOL/L — SIGNIFICANT CHANGE UP
SPECIMEN SOURCE: SIGNIFICANT CHANGE UP
STOMATOCYTES BLD QL SMEAR: SLIGHT — SIGNIFICANT CHANGE UP
UUN UR-MCNC: 350 MG/DL — SIGNIFICANT CHANGE UP
UUN UR-MCNC: 860.8 MG/DL — SIGNIFICANT CHANGE UP
VARIANT LYMPHS # BLD: 1.7 % — SIGNIFICANT CHANGE UP (ref 0–6)
VARIANT LYMPHS # BLD: 8.8 % — HIGH (ref 0–6)
WBC # BLD: 10.2 K/UL — SIGNIFICANT CHANGE UP (ref 3.8–10.5)
WBC # BLD: 10.33 K/UL — SIGNIFICANT CHANGE UP (ref 3.8–10.5)
WBC # BLD: 10.39 K/UL — SIGNIFICANT CHANGE UP (ref 3.8–10.5)
WBC # BLD: 10.44 K/UL — SIGNIFICANT CHANGE UP (ref 3.8–10.5)
WBC # BLD: 10.48 K/UL — SIGNIFICANT CHANGE UP (ref 3.8–10.5)
WBC # BLD: 11.53 K/UL — HIGH (ref 3.8–10.5)
WBC # BLD: 12.3 K/UL — HIGH (ref 3.8–10.5)
WBC # BLD: 12.74 K/UL — HIGH (ref 3.8–10.5)
WBC # BLD: 14.51 K/UL — HIGH (ref 3.8–10.5)
WBC # BLD: 15.59 K/UL — HIGH (ref 3.8–10.5)
WBC # BLD: 16.72 K/UL — HIGH (ref 3.8–10.5)
WBC # BLD: 18.47 K/UL — HIGH (ref 3.8–10.5)
WBC # BLD: 19 K/UL — HIGH (ref 3.8–10.5)
WBC # BLD: 29.39 K/UL — HIGH (ref 3.8–10.5)
WBC # BLD: 29.93 K/UL — HIGH (ref 3.8–10.5)
WBC # BLD: 7.73 K/UL — SIGNIFICANT CHANGE UP (ref 3.8–10.5)
WBC # BLD: 7.87 K/UL — SIGNIFICANT CHANGE UP (ref 3.8–10.5)
WBC # BLD: 8.27 K/UL — SIGNIFICANT CHANGE UP (ref 3.8–10.5)
WBC # BLD: 8.59 K/UL — SIGNIFICANT CHANGE UP (ref 3.8–10.5)
WBC # BLD: 8.67 K/UL — SIGNIFICANT CHANGE UP (ref 3.8–10.5)
WBC # BLD: 8.76 K/UL — SIGNIFICANT CHANGE UP (ref 3.8–10.5)
WBC # BLD: 8.95 K/UL — SIGNIFICANT CHANGE UP (ref 3.8–10.5)
WBC # BLD: 9.35 K/UL — SIGNIFICANT CHANGE UP (ref 3.8–10.5)
WBC # BLD: 9.79 K/UL — SIGNIFICANT CHANGE UP (ref 3.8–10.5)
WBC # FLD AUTO: 10.2 K/UL — SIGNIFICANT CHANGE UP (ref 3.8–10.5)
WBC # FLD AUTO: 10.33 K/UL — SIGNIFICANT CHANGE UP (ref 3.8–10.5)
WBC # FLD AUTO: 10.39 K/UL — SIGNIFICANT CHANGE UP (ref 3.8–10.5)
WBC # FLD AUTO: 10.44 K/UL — SIGNIFICANT CHANGE UP (ref 3.8–10.5)
WBC # FLD AUTO: 10.48 K/UL — SIGNIFICANT CHANGE UP (ref 3.8–10.5)
WBC # FLD AUTO: 11.53 K/UL — HIGH (ref 3.8–10.5)
WBC # FLD AUTO: 12.3 K/UL — HIGH (ref 3.8–10.5)
WBC # FLD AUTO: 12.74 K/UL — HIGH (ref 3.8–10.5)
WBC # FLD AUTO: 14.51 K/UL — HIGH (ref 3.8–10.5)
WBC # FLD AUTO: 15.59 K/UL — HIGH (ref 3.8–10.5)
WBC # FLD AUTO: 16.72 K/UL — HIGH (ref 3.8–10.5)
WBC # FLD AUTO: 18.47 K/UL — HIGH (ref 3.8–10.5)
WBC # FLD AUTO: 19 K/UL — HIGH (ref 3.8–10.5)
WBC # FLD AUTO: 29.39 K/UL — HIGH (ref 3.8–10.5)
WBC # FLD AUTO: 29.93 K/UL — HIGH (ref 3.8–10.5)
WBC # FLD AUTO: 7.73 K/UL — SIGNIFICANT CHANGE UP (ref 3.8–10.5)
WBC # FLD AUTO: 7.87 K/UL — SIGNIFICANT CHANGE UP (ref 3.8–10.5)
WBC # FLD AUTO: 8.27 K/UL — SIGNIFICANT CHANGE UP (ref 3.8–10.5)
WBC # FLD AUTO: 8.59 K/UL — SIGNIFICANT CHANGE UP (ref 3.8–10.5)
WBC # FLD AUTO: 8.67 K/UL — SIGNIFICANT CHANGE UP (ref 3.8–10.5)
WBC # FLD AUTO: 8.76 K/UL — SIGNIFICANT CHANGE UP (ref 3.8–10.5)
WBC # FLD AUTO: 8.95 K/UL — SIGNIFICANT CHANGE UP (ref 3.8–10.5)
WBC # FLD AUTO: 9.35 K/UL — SIGNIFICANT CHANGE UP (ref 3.8–10.5)
WBC # FLD AUTO: 9.79 K/UL — SIGNIFICANT CHANGE UP (ref 3.8–10.5)

## 2023-01-01 PROCEDURE — 99233 SBSQ HOSP IP/OBS HIGH 50: CPT

## 2023-01-01 PROCEDURE — 99292 CRITICAL CARE ADDL 30 MIN: CPT | Mod: GC,25

## 2023-01-01 PROCEDURE — 76937 US GUIDE VASCULAR ACCESS: CPT | Mod: 26

## 2023-01-01 PROCEDURE — 99291 CRITICAL CARE FIRST HOUR: CPT | Mod: 25

## 2023-01-01 PROCEDURE — 99291 CRITICAL CARE FIRST HOUR: CPT | Mod: GC

## 2023-01-01 PROCEDURE — 99232 SBSQ HOSP IP/OBS MODERATE 35: CPT

## 2023-01-01 PROCEDURE — 99291 CRITICAL CARE FIRST HOUR: CPT | Mod: GC,25

## 2023-01-01 PROCEDURE — 71045 X-RAY EXAM CHEST 1 VIEW: CPT | Mod: 26

## 2023-01-01 PROCEDURE — 93308 TTE F-UP OR LMTD: CPT | Mod: 26,GC

## 2023-01-01 PROCEDURE — 93970 EXTREMITY STUDY: CPT | Mod: 26

## 2023-01-01 PROCEDURE — 36620 INSERTION CATHETER ARTERY: CPT

## 2023-01-01 PROCEDURE — 99223 1ST HOSP IP/OBS HIGH 75: CPT

## 2023-01-01 PROCEDURE — 99292 CRITICAL CARE ADDL 30 MIN: CPT | Mod: GC

## 2023-01-01 PROCEDURE — 99291 CRITICAL CARE FIRST HOUR: CPT

## 2023-01-01 PROCEDURE — 93971 EXTREMITY STUDY: CPT | Mod: 26,GC

## 2023-01-01 PROCEDURE — 88189 FLOWCYTOMETRY/READ 16 & >: CPT

## 2023-01-01 PROCEDURE — 76604 US EXAM CHEST: CPT | Mod: 26,GC

## 2023-01-01 PROCEDURE — 36620 INSERTION CATHETER ARTERY: CPT | Mod: GC

## 2023-01-01 PROCEDURE — 99497 ADVNCD CARE PLAN 30 MIN: CPT | Mod: 25

## 2023-01-01 PROCEDURE — 93306 TTE W/DOPPLER COMPLETE: CPT | Mod: 26

## 2023-01-01 RX ORDER — HYDROMORPHONE HYDROCHLORIDE 2 MG/ML
1 INJECTION INTRAMUSCULAR; INTRAVENOUS; SUBCUTANEOUS ONCE
Refills: 0 | Status: DISCONTINUED | OUTPATIENT
Start: 2023-01-01 | End: 2023-01-01

## 2023-01-01 RX ORDER — MAGNESIUM SULFATE 500 MG/ML
1 VIAL (ML) INJECTION ONCE
Refills: 0 | Status: COMPLETED | OUTPATIENT
Start: 2023-01-01 | End: 2023-01-01

## 2023-01-01 RX ORDER — HYDROMORPHONE HYDROCHLORIDE 2 MG/ML
1 INJECTION INTRAMUSCULAR; INTRAVENOUS; SUBCUTANEOUS EVERY 4 HOURS
Refills: 0 | Status: DISCONTINUED | OUTPATIENT
Start: 2023-01-01 | End: 2023-01-01

## 2023-01-01 RX ORDER — MAGNESIUM SULFATE 500 MG/ML
2 VIAL (ML) INJECTION
Refills: 0 | Status: COMPLETED | OUTPATIENT
Start: 2023-01-01 | End: 2023-01-01

## 2023-01-01 RX ORDER — DEXTROSE 50 % IN WATER 50 %
15 SYRINGE (ML) INTRAVENOUS ONCE
Refills: 0 | Status: DISCONTINUED | OUTPATIENT
Start: 2023-01-01 | End: 2023-01-01

## 2023-01-01 RX ORDER — MORPHINE SULFATE 50 MG/1
2 CAPSULE, EXTENDED RELEASE ORAL EVERY 6 HOURS
Refills: 0 | Status: DISCONTINUED | OUTPATIENT
Start: 2023-01-01 | End: 2023-01-01

## 2023-01-01 RX ORDER — DAPTOMYCIN 500 MG/10ML
650 INJECTION, POWDER, LYOPHILIZED, FOR SOLUTION INTRAVENOUS ONCE
Refills: 0 | Status: COMPLETED | OUTPATIENT
Start: 2023-01-01 | End: 2023-01-01

## 2023-01-01 RX ORDER — ENOXAPARIN SODIUM 100 MG/ML
90 INJECTION SUBCUTANEOUS EVERY 12 HOURS
Refills: 0 | Status: DISCONTINUED | OUTPATIENT
Start: 2023-01-01 | End: 2023-01-01

## 2023-01-01 RX ORDER — LACTOBACILLUS ACIDOPHILUS 100MM CELL
2 CAPSULE ORAL
Refills: 0 | DISCHARGE

## 2023-01-01 RX ORDER — FLUTICASONE PROPIONATE 50 MCG
1 SPRAY, SUSPENSION NASAL
Refills: 0 | Status: DISCONTINUED | OUTPATIENT
Start: 2023-01-01 | End: 2023-01-01

## 2023-01-01 RX ORDER — CHOLECALCIFEROL (VITAMIN D3) 125 MCG
1 CAPSULE ORAL
Refills: 0 | DISCHARGE

## 2023-01-01 RX ORDER — RIFAMPIN 300 MG
1 CAPSULE ORAL
Refills: 0 | DISCHARGE

## 2023-01-01 RX ORDER — INSULIN LISPRO 100/ML
5 VIAL (ML) SUBCUTANEOUS
Refills: 0 | Status: DISCONTINUED | OUTPATIENT
Start: 2023-01-01 | End: 2023-01-01

## 2023-01-01 RX ORDER — DAPTOMYCIN 500 MG/10ML
650 INJECTION, POWDER, LYOPHILIZED, FOR SOLUTION INTRAVENOUS EVERY 24 HOURS
Refills: 0 | Status: COMPLETED | OUTPATIENT
Start: 2023-01-01 | End: 2023-01-01

## 2023-01-01 RX ORDER — HEPARIN SODIUM 5000 [USP'U]/ML
5000 INJECTION INTRAVENOUS; SUBCUTANEOUS EVERY 8 HOURS
Refills: 0 | Status: DISCONTINUED | OUTPATIENT
Start: 2023-01-01 | End: 2023-01-01

## 2023-01-01 RX ORDER — MAGNESIUM SULFATE 500 MG/ML
2 VIAL (ML) INJECTION ONCE
Refills: 0 | Status: COMPLETED | OUTPATIENT
Start: 2023-01-01 | End: 2023-01-01

## 2023-01-01 RX ORDER — BUMETANIDE 0.25 MG/ML
1 INJECTION INTRAMUSCULAR; INTRAVENOUS DAILY
Refills: 0 | Status: DISCONTINUED | OUTPATIENT
Start: 2023-01-01 | End: 2023-01-01

## 2023-01-01 RX ORDER — FUROSEMIDE 40 MG
40 TABLET ORAL ONCE
Refills: 0 | Status: COMPLETED | OUTPATIENT
Start: 2023-01-01 | End: 2023-01-01

## 2023-01-01 RX ORDER — HYDROMORPHONE HYDROCHLORIDE 2 MG/ML
0.5 INJECTION INTRAMUSCULAR; INTRAVENOUS; SUBCUTANEOUS EVERY 4 HOURS
Refills: 0 | Status: DISCONTINUED | OUTPATIENT
Start: 2023-01-01 | End: 2023-01-01

## 2023-01-01 RX ORDER — DULOXETINE HYDROCHLORIDE 30 MG/1
30 CAPSULE, DELAYED RELEASE ORAL DAILY
Refills: 0 | Status: DISCONTINUED | OUTPATIENT
Start: 2023-01-01 | End: 2023-01-01

## 2023-01-01 RX ORDER — DAPTOMYCIN 500 MG/10ML
4 INJECTION, POWDER, LYOPHILIZED, FOR SOLUTION INTRAVENOUS
Refills: 0 | DISCHARGE

## 2023-01-01 RX ORDER — ACETAMINOPHEN 500 MG
1000 TABLET ORAL ONCE
Refills: 0 | Status: COMPLETED | OUTPATIENT
Start: 2023-01-01 | End: 2023-01-01

## 2023-01-01 RX ORDER — MEROPENEM 1 G/30ML
1000 INJECTION INTRAVENOUS
Refills: 0 | DISCHARGE

## 2023-01-01 RX ORDER — PANTOPRAZOLE SODIUM 20 MG/1
40 TABLET, DELAYED RELEASE ORAL
Refills: 0 | Status: DISCONTINUED | OUTPATIENT
Start: 2023-01-01 | End: 2023-01-01

## 2023-01-01 RX ORDER — INSULIN GLARGINE 100 [IU]/ML
12 INJECTION, SOLUTION SUBCUTANEOUS AT BEDTIME
Refills: 0 | Status: DISCONTINUED | OUTPATIENT
Start: 2023-01-01 | End: 2023-01-01

## 2023-01-01 RX ORDER — HEPARIN SODIUM 5000 [USP'U]/ML
1700 INJECTION INTRAVENOUS; SUBCUTANEOUS
Qty: 25000 | Refills: 0 | Status: DISCONTINUED | OUTPATIENT
Start: 2023-01-01 | End: 2023-01-01

## 2023-01-01 RX ORDER — INSULIN LISPRO 100/ML
VIAL (ML) SUBCUTANEOUS
Refills: 0 | Status: DISCONTINUED | OUTPATIENT
Start: 2023-01-01 | End: 2023-01-01

## 2023-01-01 RX ORDER — HYDROCORTISONE 1 %
1 OINTMENT (GRAM) TOPICAL
Refills: 0 | DISCHARGE

## 2023-01-01 RX ORDER — FUROSEMIDE 40 MG
40 TABLET ORAL
Refills: 0 | Status: DISCONTINUED | OUTPATIENT
Start: 2023-01-01 | End: 2023-01-01

## 2023-01-01 RX ORDER — ACETAMINOPHEN 500 MG
650 TABLET ORAL ONCE
Refills: 0 | Status: COMPLETED | OUTPATIENT
Start: 2023-01-01 | End: 2023-01-01

## 2023-01-01 RX ORDER — NIFEDIPINE 30 MG
1 TABLET, EXTENDED RELEASE 24 HR ORAL
Refills: 0 | DISCHARGE

## 2023-01-01 RX ORDER — MUPIROCIN 20 MG/G
1 OINTMENT TOPICAL
Refills: 0 | Status: DISCONTINUED | OUTPATIENT
Start: 2023-01-01 | End: 2023-01-01

## 2023-01-01 RX ORDER — DEXTROSE 50 % IN WATER 50 %
25 SYRINGE (ML) INTRAVENOUS ONCE
Refills: 0 | Status: DISCONTINUED | OUTPATIENT
Start: 2023-01-01 | End: 2023-01-01

## 2023-01-01 RX ORDER — HEPARIN SODIUM 5000 [USP'U]/ML
1100 INJECTION INTRAVENOUS; SUBCUTANEOUS
Qty: 25000 | Refills: 0 | Status: DISCONTINUED | OUTPATIENT
Start: 2023-01-01 | End: 2023-01-01

## 2023-01-01 RX ORDER — INSULIN GLARGINE 100 [IU]/ML
12 INJECTION, SOLUTION SUBCUTANEOUS
Refills: 0 | DISCHARGE

## 2023-01-01 RX ORDER — MEROPENEM 1 G/30ML
1000 INJECTION INTRAVENOUS EVERY 8 HOURS
Refills: 0 | Status: DISCONTINUED | OUTPATIENT
Start: 2023-01-01 | End: 2023-01-01

## 2023-01-01 RX ORDER — MUPIROCIN 20 MG/G
1 OINTMENT TOPICAL
Refills: 0 | Status: COMPLETED | OUTPATIENT
Start: 2023-01-01 | End: 2023-01-01

## 2023-01-01 RX ORDER — POTASSIUM CHLORIDE 20 MEQ
40 PACKET (EA) ORAL
Refills: 0 | Status: COMPLETED | OUTPATIENT
Start: 2023-01-01 | End: 2023-01-01

## 2023-01-01 RX ORDER — DEXMEDETOMIDINE HYDROCHLORIDE IN 0.9% SODIUM CHLORIDE 4 UG/ML
0.2 INJECTION INTRAVENOUS
Qty: 400 | Refills: 0 | Status: DISCONTINUED | OUTPATIENT
Start: 2023-01-01 | End: 2023-01-01

## 2023-01-01 RX ORDER — MORPHINE SULFATE 50 MG/1
1 CAPSULE, EXTENDED RELEASE ORAL EVERY 6 HOURS
Refills: 0 | Status: DISCONTINUED | OUTPATIENT
Start: 2023-01-01 | End: 2023-01-01

## 2023-01-01 RX ORDER — DAPTOMYCIN 500 MG/10ML
INJECTION, POWDER, LYOPHILIZED, FOR SOLUTION INTRAVENOUS
Refills: 0 | Status: COMPLETED | OUTPATIENT
Start: 2023-01-01 | End: 2023-01-01

## 2023-01-01 RX ORDER — MORPHINE SULFATE 50 MG/1
0.5 CAPSULE, EXTENDED RELEASE ORAL EVERY 6 HOURS
Refills: 0 | Status: DISCONTINUED | OUTPATIENT
Start: 2023-01-01 | End: 2023-01-01

## 2023-01-01 RX ORDER — BUMETANIDE 0.25 MG/ML
1 INJECTION INTRAMUSCULAR; INTRAVENOUS
Refills: 0 | Status: DISCONTINUED | OUTPATIENT
Start: 2023-01-01 | End: 2023-01-01

## 2023-01-01 RX ORDER — INSULIN LISPRO 100/ML
6 VIAL (ML) SUBCUTANEOUS
Refills: 0 | Status: DISCONTINUED | OUTPATIENT
Start: 2023-01-01 | End: 2023-01-01

## 2023-01-01 RX ORDER — SODIUM CHLORIDE 9 MG/ML
1000 INJECTION, SOLUTION INTRAVENOUS
Refills: 0 | Status: DISCONTINUED | OUTPATIENT
Start: 2023-01-01 | End: 2023-01-01

## 2023-01-01 RX ORDER — HYDROMORPHONE HYDROCHLORIDE 2 MG/ML
0.5 INJECTION INTRAMUSCULAR; INTRAVENOUS; SUBCUTANEOUS ONCE
Refills: 0 | Status: DISCONTINUED | OUTPATIENT
Start: 2023-01-01 | End: 2023-01-01

## 2023-01-01 RX ORDER — BUMETANIDE 0.25 MG/ML
2 INJECTION INTRAMUSCULAR; INTRAVENOUS EVERY 8 HOURS
Refills: 0 | Status: DISCONTINUED | OUTPATIENT
Start: 2023-01-01 | End: 2023-01-01

## 2023-01-01 RX ORDER — DEXMEDETOMIDINE HYDROCHLORIDE IN 0.9% SODIUM CHLORIDE 4 UG/ML
0.1 INJECTION INTRAVENOUS
Qty: 400 | Refills: 0 | Status: DISCONTINUED | OUTPATIENT
Start: 2023-01-01 | End: 2023-01-01

## 2023-01-01 RX ORDER — PANTOPRAZOLE SODIUM 20 MG/1
40 TABLET, DELAYED RELEASE ORAL EVERY 12 HOURS
Refills: 0 | Status: DISCONTINUED | OUTPATIENT
Start: 2023-01-01 | End: 2023-01-01

## 2023-01-01 RX ORDER — ATOVAQUONE 750 MG/5ML
1500 SUSPENSION ORAL DAILY
Refills: 0 | Status: DISCONTINUED | OUTPATIENT
Start: 2023-01-01 | End: 2023-01-01

## 2023-01-01 RX ORDER — LIDOCAINE 4 G/100G
1 CREAM TOPICAL DAILY
Refills: 0 | Status: DISCONTINUED | OUTPATIENT
Start: 2023-01-01 | End: 2023-01-01

## 2023-01-01 RX ORDER — ATORVASTATIN CALCIUM 80 MG/1
20 TABLET, FILM COATED ORAL AT BEDTIME
Refills: 0 | Status: DISCONTINUED | OUTPATIENT
Start: 2023-01-01 | End: 2023-01-01

## 2023-01-01 RX ORDER — MORPHINE SULFATE 50 MG/1
1 CAPSULE, EXTENDED RELEASE ORAL ONCE
Refills: 0 | Status: DISCONTINUED | OUTPATIENT
Start: 2023-01-01 | End: 2023-01-01

## 2023-01-01 RX ORDER — DEXTROSE 50 % IN WATER 50 %
12.5 SYRINGE (ML) INTRAVENOUS ONCE
Refills: 0 | Status: DISCONTINUED | OUTPATIENT
Start: 2023-01-01 | End: 2023-01-01

## 2023-01-01 RX ORDER — METOPROLOL TARTRATE 50 MG
25 TABLET ORAL THREE TIMES A DAY
Refills: 0 | Status: DISCONTINUED | OUTPATIENT
Start: 2023-01-01 | End: 2023-01-01

## 2023-01-01 RX ORDER — METOPROLOL TARTRATE 50 MG
1 TABLET ORAL
Refills: 0 | DISCHARGE

## 2023-01-01 RX ORDER — POTASSIUM CHLORIDE 20 MEQ
40 PACKET (EA) ORAL ONCE
Refills: 0 | Status: COMPLETED | OUTPATIENT
Start: 2023-01-01 | End: 2023-01-01

## 2023-01-01 RX ORDER — MORPHINE SULFATE 50 MG/1
0.5 CAPSULE, EXTENDED RELEASE ORAL ONCE
Refills: 0 | Status: DISCONTINUED | OUTPATIENT
Start: 2023-01-01 | End: 2023-01-01

## 2023-01-01 RX ORDER — NOREPINEPHRINE BITARTRATE/D5W 8 MG/250ML
0.05 PLASTIC BAG, INJECTION (ML) INTRAVENOUS
Qty: 8 | Refills: 0 | Status: DISCONTINUED | OUTPATIENT
Start: 2023-01-01 | End: 2023-01-01

## 2023-01-01 RX ORDER — AMITRIPTYLINE HCL 25 MG
10 TABLET ORAL AT BEDTIME
Refills: 0 | Status: DISCONTINUED | OUTPATIENT
Start: 2023-01-01 | End: 2023-01-01

## 2023-01-01 RX ORDER — CHLORHEXIDINE GLUCONATE 213 G/1000ML
1 SOLUTION TOPICAL
Refills: 0 | Status: DISCONTINUED | OUTPATIENT
Start: 2023-01-01 | End: 2023-01-01

## 2023-01-01 RX ORDER — LACTOBACILLUS ACIDOPHILUS 100MM CELL
1 CAPSULE ORAL
Refills: 0 | Status: DISCONTINUED | OUTPATIENT
Start: 2023-01-01 | End: 2023-01-01

## 2023-01-01 RX ORDER — GLUCAGON INJECTION, SOLUTION 0.5 MG/.1ML
1 INJECTION, SOLUTION SUBCUTANEOUS ONCE
Refills: 0 | Status: DISCONTINUED | OUTPATIENT
Start: 2023-01-01 | End: 2023-01-01

## 2023-01-01 RX ORDER — INSULIN LISPRO 100/ML
7 VIAL (ML) SUBCUTANEOUS
Refills: 0 | Status: DISCONTINUED | OUTPATIENT
Start: 2023-01-01 | End: 2023-01-01

## 2023-01-01 RX ORDER — INSULIN LISPRO 100/ML
VIAL (ML) SUBCUTANEOUS AT BEDTIME
Refills: 0 | Status: DISCONTINUED | OUTPATIENT
Start: 2023-01-01 | End: 2023-01-01

## 2023-01-01 RX ORDER — DULOXETINE HYDROCHLORIDE 30 MG/1
2 CAPSULE, DELAYED RELEASE ORAL
Refills: 0 | DISCHARGE

## 2023-01-01 RX ORDER — HEPARIN SODIUM 5000 [USP'U]/ML
INJECTION INTRAVENOUS; SUBCUTANEOUS
Qty: 25000 | Refills: 0 | Status: DISCONTINUED | OUTPATIENT
Start: 2023-01-01 | End: 2023-01-01

## 2023-01-01 RX ORDER — POTASSIUM CHLORIDE 20 MEQ
10 PACKET (EA) ORAL
Refills: 0 | Status: COMPLETED | OUTPATIENT
Start: 2023-01-01 | End: 2023-01-01

## 2023-01-01 RX ORDER — SODIUM CHLORIDE 0.65 %
1 AEROSOL, SPRAY (ML) NASAL
Refills: 0 | Status: DISCONTINUED | OUTPATIENT
Start: 2023-01-01 | End: 2023-01-01

## 2023-01-01 RX ORDER — INSULIN GLARGINE 100 [IU]/ML
12 INJECTION, SOLUTION SUBCUTANEOUS ONCE
Refills: 0 | Status: COMPLETED | OUTPATIENT
Start: 2023-01-01 | End: 2023-01-01

## 2023-01-01 RX ORDER — AMITRIPTYLINE HCL 25 MG
1 TABLET ORAL
Refills: 0 | DISCHARGE

## 2023-01-01 RX ORDER — IPRATROPIUM/ALBUTEROL SULFATE 18-103MCG
3 AEROSOL WITH ADAPTER (GRAM) INHALATION EVERY 6 HOURS
Refills: 0 | Status: DISCONTINUED | OUTPATIENT
Start: 2023-01-01 | End: 2023-01-01

## 2023-01-01 RX ORDER — INSULIN GLARGINE 100 [IU]/ML
14 INJECTION, SOLUTION SUBCUTANEOUS AT BEDTIME
Refills: 0 | Status: DISCONTINUED | OUTPATIENT
Start: 2023-01-01 | End: 2023-01-01

## 2023-01-01 RX ORDER — DAPTOMYCIN 500 MG/10ML
650 INJECTION, POWDER, LYOPHILIZED, FOR SOLUTION INTRAVENOUS EVERY 24 HOURS
Refills: 0 | Status: DISCONTINUED | OUTPATIENT
Start: 2023-01-01 | End: 2023-01-01

## 2023-01-01 RX ORDER — CHOLECALCIFEROL (VITAMIN D3) 125 MCG
1000 CAPSULE ORAL DAILY
Refills: 0 | Status: DISCONTINUED | OUTPATIENT
Start: 2023-01-01 | End: 2023-01-01

## 2023-01-01 RX ORDER — INSULIN LISPRO 100/ML
5 VIAL (ML) SUBCUTANEOUS
Refills: 0 | DISCHARGE

## 2023-01-01 RX ORDER — ENOXAPARIN SODIUM 100 MG/ML
40 INJECTION SUBCUTANEOUS EVERY 24 HOURS
Refills: 0 | Status: DISCONTINUED | OUTPATIENT
Start: 2023-01-01 | End: 2023-01-01

## 2023-01-01 RX ADMIN — DULOXETINE HYDROCHLORIDE 30 MILLIGRAM(S): 30 CAPSULE, DELAYED RELEASE ORAL at 12:24

## 2023-01-01 RX ADMIN — LIDOCAINE 1 PATCH: 4 CREAM TOPICAL at 19:24

## 2023-01-01 RX ADMIN — Medication 40 MILLIEQUIVALENT(S): at 17:10

## 2023-01-01 RX ADMIN — PANTOPRAZOLE SODIUM 40 MILLIGRAM(S): 20 TABLET, DELAYED RELEASE ORAL at 06:00

## 2023-01-01 RX ADMIN — LIDOCAINE 1 PATCH: 4 CREAM TOPICAL at 19:51

## 2023-01-01 RX ADMIN — Medication 100 MILLIEQUIVALENT(S): at 01:54

## 2023-01-01 RX ADMIN — MUPIROCIN 1 APPLICATION(S): 20 OINTMENT TOPICAL at 17:44

## 2023-01-01 RX ADMIN — Medication 500 MILLIGRAM(S): at 16:22

## 2023-01-01 RX ADMIN — LIDOCAINE 1 PATCH: 4 CREAM TOPICAL at 12:17

## 2023-01-01 RX ADMIN — ATORVASTATIN CALCIUM 20 MILLIGRAM(S): 80 TABLET, FILM COATED ORAL at 22:42

## 2023-01-01 RX ADMIN — CHLORHEXIDINE GLUCONATE 1 APPLICATION(S): 213 SOLUTION TOPICAL at 06:14

## 2023-01-01 RX ADMIN — LIDOCAINE 1 PATCH: 4 CREAM TOPICAL at 16:03

## 2023-01-01 RX ADMIN — Medication 25 MILLIGRAM(S): at 13:59

## 2023-01-01 RX ADMIN — Medication 20 MILLIGRAM(S): at 06:01

## 2023-01-01 RX ADMIN — LIDOCAINE 1 PATCH: 4 CREAM TOPICAL at 00:00

## 2023-01-01 RX ADMIN — Medication 1 TABLET(S): at 17:42

## 2023-01-01 RX ADMIN — LIDOCAINE 1 PATCH: 4 CREAM TOPICAL at 23:07

## 2023-01-01 RX ADMIN — BUMETANIDE 1 MILLIGRAM(S): 0.25 INJECTION INTRAMUSCULAR; INTRAVENOUS at 06:03

## 2023-01-01 RX ADMIN — Medication 1 TABLET(S): at 08:48

## 2023-01-01 RX ADMIN — INSULIN GLARGINE 14 UNIT(S): 100 INJECTION, SOLUTION SUBCUTANEOUS at 22:42

## 2023-01-01 RX ADMIN — Medication 10 MILLIGRAM(S): at 07:43

## 2023-01-01 RX ADMIN — Medication 1: at 17:21

## 2023-01-01 RX ADMIN — Medication 1000 UNIT(S): at 12:22

## 2023-01-01 RX ADMIN — HEPARIN SODIUM 11 UNIT(S)/HR: 5000 INJECTION INTRAVENOUS; SUBCUTANEOUS at 17:21

## 2023-01-01 RX ADMIN — Medication 40 MILLIGRAM(S): at 06:03

## 2023-01-01 RX ADMIN — Medication 1 TABLET(S): at 08:22

## 2023-01-01 RX ADMIN — Medication 3: at 08:27

## 2023-01-01 RX ADMIN — Medication 40 MILLIGRAM(S): at 15:33

## 2023-01-01 RX ADMIN — BUMETANIDE 2 MILLIGRAM(S): 0.25 INJECTION INTRAMUSCULAR; INTRAVENOUS at 21:41

## 2023-01-01 RX ADMIN — Medication 7 UNIT(S): at 12:44

## 2023-01-01 RX ADMIN — Medication 7 UNIT(S): at 08:42

## 2023-01-01 RX ADMIN — Medication 400 MILLIGRAM(S): at 06:00

## 2023-01-01 RX ADMIN — LIDOCAINE 1 PATCH: 4 CREAM TOPICAL at 23:00

## 2023-01-01 RX ADMIN — DAPTOMYCIN 126 MILLIGRAM(S): 500 INJECTION, POWDER, LYOPHILIZED, FOR SOLUTION INTRAVENOUS at 02:56

## 2023-01-01 RX ADMIN — LIDOCAINE 1 PATCH: 4 CREAM TOPICAL at 12:24

## 2023-01-01 RX ADMIN — PANTOPRAZOLE SODIUM 40 MILLIGRAM(S): 20 TABLET, DELAYED RELEASE ORAL at 17:31

## 2023-01-01 RX ADMIN — PANTOPRAZOLE SODIUM 40 MILLIGRAM(S): 20 TABLET, DELAYED RELEASE ORAL at 05:37

## 2023-01-01 RX ADMIN — MUPIROCIN 1 APPLICATION(S): 20 OINTMENT TOPICAL at 06:02

## 2023-01-01 RX ADMIN — Medication 1: at 08:51

## 2023-01-01 RX ADMIN — Medication 400 MILLIGRAM(S): at 06:34

## 2023-01-01 RX ADMIN — Medication 40 MILLIEQUIVALENT(S): at 17:49

## 2023-01-01 RX ADMIN — Medication 5 UNIT(S): at 12:30

## 2023-01-01 RX ADMIN — Medication 6 UNIT(S): at 11:54

## 2023-01-01 RX ADMIN — Medication 25 MILLIGRAM(S): at 13:22

## 2023-01-01 RX ADMIN — Medication 400 MILLIGRAM(S): at 22:41

## 2023-01-01 RX ADMIN — Medication 40 MILLIGRAM(S): at 10:48

## 2023-01-01 RX ADMIN — LIDOCAINE 1 PATCH: 4 CREAM TOPICAL at 12:00

## 2023-01-01 RX ADMIN — Medication 1 TABLET(S): at 17:30

## 2023-01-01 RX ADMIN — Medication 2: at 08:19

## 2023-01-01 RX ADMIN — DAPTOMYCIN 126 MILLIGRAM(S): 500 INJECTION, POWDER, LYOPHILIZED, FOR SOLUTION INTRAVENOUS at 00:01

## 2023-01-01 RX ADMIN — DULOXETINE HYDROCHLORIDE 30 MILLIGRAM(S): 30 CAPSULE, DELAYED RELEASE ORAL at 18:30

## 2023-01-01 RX ADMIN — Medication 400 MILLIGRAM(S): at 21:49

## 2023-01-01 RX ADMIN — Medication 3 MILLILITER(S): at 21:49

## 2023-01-01 RX ADMIN — Medication 1 TABLET(S): at 08:29

## 2023-01-01 RX ADMIN — Medication 25 MILLIGRAM(S): at 21:48

## 2023-01-01 RX ADMIN — Medication 1 TABLET(S): at 12:19

## 2023-01-01 RX ADMIN — Medication 400 MILLIGRAM(S): at 12:55

## 2023-01-01 RX ADMIN — BUMETANIDE 1 MILLIGRAM(S): 0.25 INJECTION INTRAMUSCULAR; INTRAVENOUS at 05:37

## 2023-01-01 RX ADMIN — ENOXAPARIN SODIUM 90 MILLIGRAM(S): 100 INJECTION SUBCUTANEOUS at 17:43

## 2023-01-01 RX ADMIN — Medication 2: at 07:42

## 2023-01-01 RX ADMIN — Medication 1: at 17:42

## 2023-01-01 RX ADMIN — Medication 25 MILLIGRAM(S): at 06:02

## 2023-01-01 RX ADMIN — Medication 40 MILLIGRAM(S): at 06:39

## 2023-01-01 RX ADMIN — BUMETANIDE 1 MILLIGRAM(S): 0.25 INJECTION INTRAMUSCULAR; INTRAVENOUS at 17:10

## 2023-01-01 RX ADMIN — DAPTOMYCIN 126 MILLIGRAM(S): 500 INJECTION, POWDER, LYOPHILIZED, FOR SOLUTION INTRAVENOUS at 01:03

## 2023-01-01 RX ADMIN — Medication 1 TABLET(S): at 12:00

## 2023-01-01 RX ADMIN — HEPARIN SODIUM 5000 UNIT(S): 5000 INJECTION INTRAVENOUS; SUBCUTANEOUS at 22:48

## 2023-01-01 RX ADMIN — Medication 25 MILLIGRAM(S): at 22:42

## 2023-01-01 RX ADMIN — LIDOCAINE 1 PATCH: 4 CREAM TOPICAL at 19:02

## 2023-01-01 RX ADMIN — Medication 1 TABLET(S): at 17:28

## 2023-01-01 RX ADMIN — Medication 1 TABLET(S): at 07:43

## 2023-01-01 RX ADMIN — Medication 1 TABLET(S): at 17:21

## 2023-01-01 RX ADMIN — Medication 5 UNIT(S): at 17:46

## 2023-01-01 RX ADMIN — LIDOCAINE 1 PATCH: 4 CREAM TOPICAL at 00:50

## 2023-01-01 RX ADMIN — Medication 7 UNIT(S): at 17:26

## 2023-01-01 RX ADMIN — HEPARIN SODIUM 5000 UNIT(S): 5000 INJECTION INTRAVENOUS; SUBCUTANEOUS at 22:40

## 2023-01-01 RX ADMIN — BUMETANIDE 1 MILLIGRAM(S): 0.25 INJECTION INTRAMUSCULAR; INTRAVENOUS at 13:58

## 2023-01-01 RX ADMIN — MORPHINE SULFATE 1 MILLIGRAM(S): 50 CAPSULE, EXTENDED RELEASE ORAL at 23:22

## 2023-01-01 RX ADMIN — Medication 40 MILLIGRAM(S): at 05:11

## 2023-01-01 RX ADMIN — BUMETANIDE 1 MILLIGRAM(S): 0.25 INJECTION INTRAMUSCULAR; INTRAVENOUS at 06:01

## 2023-01-01 RX ADMIN — MUPIROCIN 1 APPLICATION(S): 20 OINTMENT TOPICAL at 17:10

## 2023-01-01 RX ADMIN — Medication 20 MILLIGRAM(S): at 22:40

## 2023-01-01 RX ADMIN — Medication 40 MILLIGRAM(S): at 05:21

## 2023-01-01 RX ADMIN — BUMETANIDE 1 MILLIGRAM(S): 0.25 INJECTION INTRAMUSCULAR; INTRAVENOUS at 00:41

## 2023-01-01 RX ADMIN — Medication 1 SPRAY(S): at 17:23

## 2023-01-01 RX ADMIN — MEROPENEM 100 MILLIGRAM(S): 1 INJECTION INTRAVENOUS at 11:01

## 2023-01-01 RX ADMIN — Medication 20 MILLIGRAM(S): at 06:13

## 2023-01-01 RX ADMIN — Medication 25 GRAM(S): at 08:21

## 2023-01-01 RX ADMIN — Medication 1 TABLET(S): at 12:06

## 2023-01-01 RX ADMIN — ENOXAPARIN SODIUM 90 MILLIGRAM(S): 100 INJECTION SUBCUTANEOUS at 17:52

## 2023-01-01 RX ADMIN — Medication 1 TABLET(S): at 08:45

## 2023-01-01 RX ADMIN — HYDROMORPHONE HYDROCHLORIDE 1 MILLIGRAM(S): 2 INJECTION INTRAMUSCULAR; INTRAVENOUS; SUBCUTANEOUS at 04:22

## 2023-01-01 RX ADMIN — Medication 320 MILLIGRAM(S): at 16:27

## 2023-01-01 RX ADMIN — HYDROMORPHONE HYDROCHLORIDE 0.5 MILLIGRAM(S): 2 INJECTION INTRAMUSCULAR; INTRAVENOUS; SUBCUTANEOUS at 08:58

## 2023-01-01 RX ADMIN — Medication 25 MILLIGRAM(S): at 05:45

## 2023-01-01 RX ADMIN — HYDROMORPHONE HYDROCHLORIDE 1 MILLIGRAM(S): 2 INJECTION INTRAMUSCULAR; INTRAVENOUS; SUBCUTANEOUS at 08:22

## 2023-01-01 RX ADMIN — BUMETANIDE 1 MILLIGRAM(S): 0.25 INJECTION INTRAMUSCULAR; INTRAVENOUS at 16:39

## 2023-01-01 RX ADMIN — Medication 25 MILLIGRAM(S): at 06:04

## 2023-01-01 RX ADMIN — Medication 1000 MILLIGRAM(S): at 10:30

## 2023-01-01 RX ADMIN — HYDROMORPHONE HYDROCHLORIDE 1 MILLIGRAM(S): 2 INJECTION INTRAMUSCULAR; INTRAVENOUS; SUBCUTANEOUS at 00:30

## 2023-01-01 RX ADMIN — PANTOPRAZOLE SODIUM 40 MILLIGRAM(S): 20 TABLET, DELAYED RELEASE ORAL at 17:28

## 2023-01-01 RX ADMIN — MUPIROCIN 1 APPLICATION(S): 20 OINTMENT TOPICAL at 06:00

## 2023-01-01 RX ADMIN — Medication 400 MILLIGRAM(S): at 06:01

## 2023-01-01 RX ADMIN — Medication 7 UNIT(S): at 12:00

## 2023-01-01 RX ADMIN — Medication 1 TABLET(S): at 17:10

## 2023-01-01 RX ADMIN — Medication 400 MILLIGRAM(S): at 05:23

## 2023-01-01 RX ADMIN — CHLORHEXIDINE GLUCONATE 1 APPLICATION(S): 213 SOLUTION TOPICAL at 05:30

## 2023-01-01 RX ADMIN — Medication 25 GRAM(S): at 10:29

## 2023-01-01 RX ADMIN — Medication 525 MILLIGRAM(S): at 21:22

## 2023-01-01 RX ADMIN — Medication 1000 UNIT(S): at 12:21

## 2023-01-01 RX ADMIN — MEROPENEM 100 MILLIGRAM(S): 1 INJECTION INTRAVENOUS at 00:40

## 2023-01-01 RX ADMIN — Medication 1 SPRAY(S): at 06:03

## 2023-01-01 RX ADMIN — Medication 1 TABLET(S): at 17:54

## 2023-01-01 RX ADMIN — ATORVASTATIN CALCIUM 20 MILLIGRAM(S): 80 TABLET, FILM COATED ORAL at 21:48

## 2023-01-01 RX ADMIN — PANTOPRAZOLE SODIUM 40 MILLIGRAM(S): 20 TABLET, DELAYED RELEASE ORAL at 12:45

## 2023-01-01 RX ADMIN — Medication 10 MILLIGRAM(S): at 16:22

## 2023-01-01 RX ADMIN — Medication 10 MILLIGRAM(S): at 21:12

## 2023-01-01 RX ADMIN — Medication 5 UNIT(S): at 08:30

## 2023-01-01 RX ADMIN — Medication 25 MILLIGRAM(S): at 05:23

## 2023-01-01 RX ADMIN — HYDROMORPHONE HYDROCHLORIDE 1 MILLIGRAM(S): 2 INJECTION INTRAMUSCULAR; INTRAVENOUS; SUBCUTANEOUS at 08:35

## 2023-01-01 RX ADMIN — ATORVASTATIN CALCIUM 20 MILLIGRAM(S): 80 TABLET, FILM COATED ORAL at 21:12

## 2023-01-01 RX ADMIN — Medication 1000 UNIT(S): at 12:00

## 2023-01-01 RX ADMIN — DULOXETINE HYDROCHLORIDE 30 MILLIGRAM(S): 30 CAPSULE, DELAYED RELEASE ORAL at 12:02

## 2023-01-01 RX ADMIN — BUMETANIDE 2 MILLIGRAM(S): 0.25 INJECTION INTRAMUSCULAR; INTRAVENOUS at 05:12

## 2023-01-01 RX ADMIN — INSULIN GLARGINE 12 UNIT(S): 100 INJECTION, SOLUTION SUBCUTANEOUS at 21:24

## 2023-01-01 RX ADMIN — LIDOCAINE 1 PATCH: 4 CREAM TOPICAL at 19:15

## 2023-01-01 RX ADMIN — Medication 40 MILLIGRAM(S): at 13:22

## 2023-01-01 RX ADMIN — Medication 200 MILLIGRAM(S): at 22:40

## 2023-01-01 RX ADMIN — Medication 7 UNIT(S): at 16:26

## 2023-01-01 RX ADMIN — Medication 10 MILLIGRAM(S): at 17:10

## 2023-01-01 RX ADMIN — MEROPENEM 100 MILLIGRAM(S): 1 INJECTION INTRAVENOUS at 17:43

## 2023-01-01 RX ADMIN — DAPTOMYCIN 126 MILLIGRAM(S): 500 INJECTION, POWDER, LYOPHILIZED, FOR SOLUTION INTRAVENOUS at 11:18

## 2023-01-01 RX ADMIN — Medication 20 MILLIGRAM(S): at 15:43

## 2023-01-01 RX ADMIN — Medication 25 MILLIGRAM(S): at 06:13

## 2023-01-01 RX ADMIN — INSULIN GLARGINE 14 UNIT(S): 100 INJECTION, SOLUTION SUBCUTANEOUS at 21:38

## 2023-01-01 RX ADMIN — Medication 1000 MILLIGRAM(S): at 03:05

## 2023-01-01 RX ADMIN — Medication 200 MILLIGRAM(S): at 17:11

## 2023-01-01 RX ADMIN — Medication 400 MILLIGRAM(S): at 21:13

## 2023-01-01 RX ADMIN — MEROPENEM 100 MILLIGRAM(S): 1 INJECTION INTRAVENOUS at 17:09

## 2023-01-01 RX ADMIN — Medication 1 SPRAY(S): at 05:23

## 2023-01-01 RX ADMIN — Medication 3: at 17:34

## 2023-01-01 RX ADMIN — Medication 1: at 12:17

## 2023-01-01 RX ADMIN — Medication 400 MILLIGRAM(S): at 10:28

## 2023-01-01 RX ADMIN — Medication 25 MILLIGRAM(S): at 15:31

## 2023-01-01 RX ADMIN — Medication 1 TABLET(S): at 08:26

## 2023-01-01 RX ADMIN — DAPTOMYCIN 126 MILLIGRAM(S): 500 INJECTION, POWDER, LYOPHILIZED, FOR SOLUTION INTRAVENOUS at 11:07

## 2023-01-01 RX ADMIN — Medication 25 MILLIGRAM(S): at 21:12

## 2023-01-01 RX ADMIN — Medication 400 MILLIGRAM(S): at 06:07

## 2023-01-01 RX ADMIN — Medication 100 MILLIEQUIVALENT(S): at 04:31

## 2023-01-01 RX ADMIN — MUPIROCIN 1 APPLICATION(S): 20 OINTMENT TOPICAL at 05:29

## 2023-01-01 RX ADMIN — Medication 7 UNIT(S): at 08:22

## 2023-01-01 RX ADMIN — LIDOCAINE 1 PATCH: 4 CREAM TOPICAL at 19:12

## 2023-01-01 RX ADMIN — CHLORHEXIDINE GLUCONATE 1 APPLICATION(S): 213 SOLUTION TOPICAL at 05:22

## 2023-01-01 RX ADMIN — ENOXAPARIN SODIUM 90 MILLIGRAM(S): 100 INJECTION SUBCUTANEOUS at 06:01

## 2023-01-01 RX ADMIN — DEXMEDETOMIDINE HYDROCHLORIDE IN 0.9% SODIUM CHLORIDE 4.65 MICROGRAM(S)/KG/HR: 4 INJECTION INTRAVENOUS at 10:33

## 2023-01-01 RX ADMIN — MORPHINE SULFATE 1 MILLIGRAM(S): 50 CAPSULE, EXTENDED RELEASE ORAL at 13:15

## 2023-01-01 RX ADMIN — Medication 1000 MILLIGRAM(S): at 13:10

## 2023-01-01 RX ADMIN — Medication 400 MILLIGRAM(S): at 05:47

## 2023-01-01 RX ADMIN — CHLORHEXIDINE GLUCONATE 1 APPLICATION(S): 213 SOLUTION TOPICAL at 05:12

## 2023-01-01 RX ADMIN — PANTOPRAZOLE SODIUM 40 MILLIGRAM(S): 20 TABLET, DELAYED RELEASE ORAL at 19:16

## 2023-01-01 RX ADMIN — MORPHINE SULFATE 2 MILLIGRAM(S): 50 CAPSULE, EXTENDED RELEASE ORAL at 18:04

## 2023-01-01 RX ADMIN — Medication 1 SPRAY(S): at 06:14

## 2023-01-01 RX ADMIN — Medication 1: at 08:43

## 2023-01-01 RX ADMIN — ENOXAPARIN SODIUM 90 MILLIGRAM(S): 100 INJECTION SUBCUTANEOUS at 17:09

## 2023-01-01 RX ADMIN — LIDOCAINE 1 PATCH: 4 CREAM TOPICAL at 22:33

## 2023-01-01 RX ADMIN — LIDOCAINE 1 PATCH: 4 CREAM TOPICAL at 04:44

## 2023-01-01 RX ADMIN — Medication 20 MILLIGRAM(S): at 22:42

## 2023-01-01 RX ADMIN — DAPTOMYCIN 126 MILLIGRAM(S): 500 INJECTION, POWDER, LYOPHILIZED, FOR SOLUTION INTRAVENOUS at 01:16

## 2023-01-01 RX ADMIN — BUMETANIDE 1 MILLIGRAM(S): 0.25 INJECTION INTRAMUSCULAR; INTRAVENOUS at 17:41

## 2023-01-01 RX ADMIN — ENOXAPARIN SODIUM 90 MILLIGRAM(S): 100 INJECTION SUBCUTANEOUS at 05:46

## 2023-01-01 RX ADMIN — Medication 1 SPRAY(S): at 17:22

## 2023-01-01 RX ADMIN — Medication 20 MILLIGRAM(S): at 22:49

## 2023-01-01 RX ADMIN — ENOXAPARIN SODIUM 90 MILLIGRAM(S): 100 INJECTION SUBCUTANEOUS at 17:11

## 2023-01-01 RX ADMIN — Medication 20 MILLIGRAM(S): at 22:20

## 2023-01-01 RX ADMIN — LIDOCAINE 1 PATCH: 4 CREAM TOPICAL at 21:00

## 2023-01-01 RX ADMIN — LIDOCAINE 1 PATCH: 4 CREAM TOPICAL at 12:01

## 2023-01-01 RX ADMIN — Medication 40 MILLIGRAM(S): at 21:24

## 2023-01-01 RX ADMIN — Medication 20 MILLIGRAM(S): at 21:17

## 2023-01-01 RX ADMIN — PANTOPRAZOLE SODIUM 40 MILLIGRAM(S): 20 TABLET, DELAYED RELEASE ORAL at 05:22

## 2023-01-01 RX ADMIN — Medication 0.5 MILLIGRAM(S): at 21:43

## 2023-01-01 RX ADMIN — Medication 40 MILLIGRAM(S): at 18:33

## 2023-01-01 RX ADMIN — Medication 25 MILLIGRAM(S): at 06:00

## 2023-01-01 RX ADMIN — Medication 40 MILLIGRAM(S): at 05:38

## 2023-01-01 RX ADMIN — Medication 160 MILLIGRAM(S): at 14:50

## 2023-01-01 RX ADMIN — MORPHINE SULFATE 1 MILLIGRAM(S): 50 CAPSULE, EXTENDED RELEASE ORAL at 21:11

## 2023-01-01 RX ADMIN — INSULIN GLARGINE 12 UNIT(S): 100 INJECTION, SOLUTION SUBCUTANEOUS at 01:18

## 2023-01-01 RX ADMIN — MORPHINE SULFATE 1 MILLIGRAM(S): 50 CAPSULE, EXTENDED RELEASE ORAL at 23:26

## 2023-01-01 RX ADMIN — BUMETANIDE 1 MILLIGRAM(S): 0.25 INJECTION INTRAMUSCULAR; INTRAVENOUS at 06:18

## 2023-01-01 RX ADMIN — HEPARIN SODIUM 5000 UNIT(S): 5000 INJECTION INTRAVENOUS; SUBCUTANEOUS at 06:04

## 2023-01-01 RX ADMIN — Medication 40 MILLIGRAM(S): at 21:20

## 2023-01-01 RX ADMIN — Medication 7 UNIT(S): at 12:20

## 2023-01-01 RX ADMIN — MUPIROCIN 1 APPLICATION(S): 20 OINTMENT TOPICAL at 17:19

## 2023-01-01 RX ADMIN — Medication 100 GRAM(S): at 03:04

## 2023-01-01 RX ADMIN — MUPIROCIN 1 APPLICATION(S): 20 OINTMENT TOPICAL at 05:26

## 2023-01-01 RX ADMIN — Medication 1 TABLET(S): at 14:31

## 2023-01-01 RX ADMIN — MORPHINE SULFATE 2 MILLIGRAM(S): 50 CAPSULE, EXTENDED RELEASE ORAL at 19:17

## 2023-01-01 RX ADMIN — ENOXAPARIN SODIUM 40 MILLIGRAM(S): 100 INJECTION SUBCUTANEOUS at 06:04

## 2023-01-01 RX ADMIN — Medication 10 MILLIGRAM(S): at 22:40

## 2023-01-01 RX ADMIN — Medication 10 MILLIGRAM(S): at 22:43

## 2023-01-01 RX ADMIN — HEPARIN SODIUM 5000 UNIT(S): 5000 INJECTION INTRAVENOUS; SUBCUTANEOUS at 06:12

## 2023-01-01 RX ADMIN — Medication 5 UNIT(S): at 11:52

## 2023-01-01 RX ADMIN — BUMETANIDE 1 MILLIGRAM(S): 0.25 INJECTION INTRAMUSCULAR; INTRAVENOUS at 12:33

## 2023-01-01 RX ADMIN — Medication 10 MILLIGRAM(S): at 22:20

## 2023-01-01 RX ADMIN — Medication 7 UNIT(S): at 17:18

## 2023-01-01 RX ADMIN — Medication 10 MILLIGRAM(S): at 23:02

## 2023-01-01 RX ADMIN — DULOXETINE HYDROCHLORIDE 30 MILLIGRAM(S): 30 CAPSULE, DELAYED RELEASE ORAL at 12:48

## 2023-01-01 RX ADMIN — BUMETANIDE 1 MILLIGRAM(S): 0.25 INJECTION INTRAMUSCULAR; INTRAVENOUS at 06:13

## 2023-01-01 RX ADMIN — CHLORHEXIDINE GLUCONATE 1 APPLICATION(S): 213 SOLUTION TOPICAL at 05:26

## 2023-01-01 RX ADMIN — INSULIN GLARGINE 14 UNIT(S): 100 INJECTION, SOLUTION SUBCUTANEOUS at 22:47

## 2023-01-01 RX ADMIN — HYDROMORPHONE HYDROCHLORIDE 0.5 MILLIGRAM(S): 2 INJECTION INTRAMUSCULAR; INTRAVENOUS; SUBCUTANEOUS at 22:01

## 2023-01-01 RX ADMIN — BUMETANIDE 1 MILLIGRAM(S): 0.25 INJECTION INTRAMUSCULAR; INTRAVENOUS at 13:22

## 2023-01-01 RX ADMIN — Medication 25 MILLIGRAM(S): at 05:28

## 2023-01-01 RX ADMIN — Medication 7 UNIT(S): at 08:55

## 2023-01-01 RX ADMIN — Medication 20 MILLIGRAM(S): at 05:22

## 2023-01-01 RX ADMIN — MEROPENEM 100 MILLIGRAM(S): 1 INJECTION INTRAVENOUS at 17:40

## 2023-01-01 RX ADMIN — LIDOCAINE 1 PATCH: 4 CREAM TOPICAL at 11:50

## 2023-01-01 RX ADMIN — Medication 25 GRAM(S): at 06:00

## 2023-01-01 RX ADMIN — Medication 20 MILLIGRAM(S): at 06:02

## 2023-01-01 RX ADMIN — CHLORHEXIDINE GLUCONATE 1 APPLICATION(S): 213 SOLUTION TOPICAL at 06:00

## 2023-01-01 RX ADMIN — MEROPENEM 100 MILLIGRAM(S): 1 INJECTION INTRAVENOUS at 00:24

## 2023-01-01 RX ADMIN — MEROPENEM 100 MILLIGRAM(S): 1 INJECTION INTRAVENOUS at 16:22

## 2023-01-01 RX ADMIN — Medication 2: at 12:21

## 2023-01-01 RX ADMIN — Medication 40 MILLIGRAM(S): at 06:01

## 2023-01-01 RX ADMIN — Medication 1000 MILLIGRAM(S): at 06:56

## 2023-01-01 RX ADMIN — Medication 400 MILLIGRAM(S): at 05:28

## 2023-01-01 RX ADMIN — Medication 400 MILLIGRAM(S): at 02:30

## 2023-01-01 RX ADMIN — Medication 1 SPRAY(S): at 06:19

## 2023-01-01 RX ADMIN — DULOXETINE HYDROCHLORIDE 30 MILLIGRAM(S): 30 CAPSULE, DELAYED RELEASE ORAL at 14:32

## 2023-01-01 RX ADMIN — DULOXETINE HYDROCHLORIDE 30 MILLIGRAM(S): 30 CAPSULE, DELAYED RELEASE ORAL at 12:21

## 2023-01-01 RX ADMIN — Medication 400 MILLIGRAM(S): at 22:19

## 2023-01-01 RX ADMIN — Medication 40 MILLIGRAM(S): at 22:42

## 2023-01-01 RX ADMIN — CHLORHEXIDINE GLUCONATE 1 APPLICATION(S): 213 SOLUTION TOPICAL at 05:24

## 2023-01-01 RX ADMIN — MORPHINE SULFATE 0.5 MILLIGRAM(S): 50 CAPSULE, EXTENDED RELEASE ORAL at 21:37

## 2023-01-01 RX ADMIN — Medication 40 MILLIGRAM(S): at 17:11

## 2023-01-01 RX ADMIN — Medication 650 MILLIGRAM(S): at 23:15

## 2023-01-01 RX ADMIN — Medication 1 SPRAY(S): at 05:24

## 2023-01-01 RX ADMIN — Medication 25 MILLIGRAM(S): at 21:17

## 2023-01-01 RX ADMIN — ENOXAPARIN SODIUM 90 MILLIGRAM(S): 100 INJECTION SUBCUTANEOUS at 05:29

## 2023-01-01 RX ADMIN — BUMETANIDE 1 MILLIGRAM(S): 0.25 INJECTION INTRAMUSCULAR; INTRAVENOUS at 18:30

## 2023-01-01 RX ADMIN — ATORVASTATIN CALCIUM 20 MILLIGRAM(S): 80 TABLET, FILM COATED ORAL at 21:24

## 2023-01-01 RX ADMIN — LIDOCAINE 1 PATCH: 4 CREAM TOPICAL at 19:09

## 2023-01-01 RX ADMIN — ATORVASTATIN CALCIUM 20 MILLIGRAM(S): 80 TABLET, FILM COATED ORAL at 21:20

## 2023-01-01 RX ADMIN — Medication 1000 MILLIGRAM(S): at 14:15

## 2023-01-01 RX ADMIN — DULOXETINE HYDROCHLORIDE 30 MILLIGRAM(S): 30 CAPSULE, DELAYED RELEASE ORAL at 12:51

## 2023-01-01 RX ADMIN — Medication 400 MILLIGRAM(S): at 21:35

## 2023-01-01 RX ADMIN — Medication 10 MILLIGRAM(S): at 21:48

## 2023-01-01 RX ADMIN — Medication 1 SPRAY(S): at 17:29

## 2023-01-01 RX ADMIN — Medication 1 TABLET(S): at 07:57

## 2023-01-01 RX ADMIN — Medication 1 TABLET(S): at 11:49

## 2023-01-01 RX ADMIN — Medication 25 MILLIGRAM(S): at 05:22

## 2023-01-01 RX ADMIN — MEROPENEM 100 MILLIGRAM(S): 1 INJECTION INTRAVENOUS at 09:56

## 2023-01-01 RX ADMIN — Medication 400 MILLIGRAM(S): at 13:31

## 2023-01-01 RX ADMIN — Medication 1: at 12:22

## 2023-01-01 RX ADMIN — LIDOCAINE 1 PATCH: 4 CREAM TOPICAL at 19:31

## 2023-01-01 RX ADMIN — DAPTOMYCIN 650 MILLIGRAM(S): 500 INJECTION, POWDER, LYOPHILIZED, FOR SOLUTION INTRAVENOUS at 01:55

## 2023-01-01 RX ADMIN — Medication 1: at 22:31

## 2023-01-01 RX ADMIN — Medication 7 UNIT(S): at 17:22

## 2023-01-01 RX ADMIN — Medication 200 MILLIGRAM(S): at 12:29

## 2023-01-01 RX ADMIN — MUPIROCIN 1 APPLICATION(S): 20 OINTMENT TOPICAL at 05:12

## 2023-01-01 RX ADMIN — Medication 1000 UNIT(S): at 13:58

## 2023-01-01 RX ADMIN — HYDROMORPHONE HYDROCHLORIDE 1 MILLIGRAM(S): 2 INJECTION INTRAMUSCULAR; INTRAVENOUS; SUBCUTANEOUS at 00:21

## 2023-01-01 RX ADMIN — Medication 20 MILLIGRAM(S): at 14:07

## 2023-01-01 RX ADMIN — Medication 10 MILLIGRAM(S): at 23:54

## 2023-01-01 RX ADMIN — INSULIN GLARGINE 14 UNIT(S): 100 INJECTION, SOLUTION SUBCUTANEOUS at 22:43

## 2023-01-01 RX ADMIN — Medication 1: at 17:11

## 2023-01-01 RX ADMIN — Medication 20 MILLIGRAM(S): at 15:31

## 2023-01-01 RX ADMIN — Medication 1000 UNIT(S): at 12:48

## 2023-01-01 RX ADMIN — ATORVASTATIN CALCIUM 20 MILLIGRAM(S): 80 TABLET, FILM COATED ORAL at 22:49

## 2023-01-01 RX ADMIN — Medication 650 MILLIGRAM(S): at 16:30

## 2023-01-01 RX ADMIN — MORPHINE SULFATE 0.5 MILLIGRAM(S): 50 CAPSULE, EXTENDED RELEASE ORAL at 11:19

## 2023-01-01 RX ADMIN — INSULIN GLARGINE 12 UNIT(S): 100 INJECTION, SOLUTION SUBCUTANEOUS at 21:36

## 2023-01-01 RX ADMIN — BUMETANIDE 2 MILLIGRAM(S): 0.25 INJECTION INTRAMUSCULAR; INTRAVENOUS at 05:45

## 2023-01-01 RX ADMIN — LIDOCAINE 1 PATCH: 4 CREAM TOPICAL at 19:44

## 2023-01-01 RX ADMIN — INSULIN GLARGINE 14 UNIT(S): 100 INJECTION, SOLUTION SUBCUTANEOUS at 21:49

## 2023-01-01 RX ADMIN — Medication 20 MILLIGRAM(S): at 13:57

## 2023-01-01 RX ADMIN — BUMETANIDE 2 MILLIGRAM(S): 0.25 INJECTION INTRAMUSCULAR; INTRAVENOUS at 13:31

## 2023-01-01 RX ADMIN — Medication 200 MILLIGRAM(S): at 21:23

## 2023-01-01 RX ADMIN — LIDOCAINE 1 PATCH: 4 CREAM TOPICAL at 21:10

## 2023-01-01 RX ADMIN — Medication 400 MILLIGRAM(S): at 15:30

## 2023-01-01 RX ADMIN — Medication 7 UNIT(S): at 17:20

## 2023-01-01 RX ADMIN — Medication 5 UNIT(S): at 08:18

## 2023-01-01 RX ADMIN — BUMETANIDE 1 MILLIGRAM(S): 0.25 INJECTION INTRAMUSCULAR; INTRAVENOUS at 23:54

## 2023-01-01 RX ADMIN — Medication 1: at 16:26

## 2023-01-01 RX ADMIN — ATOVAQUONE 1500 MILLIGRAM(S): 750 SUSPENSION ORAL at 12:49

## 2023-01-01 RX ADMIN — Medication 25 MILLIGRAM(S): at 21:25

## 2023-01-01 RX ADMIN — Medication 25 MILLIGRAM(S): at 06:01

## 2023-01-01 RX ADMIN — DAPTOMYCIN 126 MILLIGRAM(S): 500 INJECTION, POWDER, LYOPHILIZED, FOR SOLUTION INTRAVENOUS at 00:26

## 2023-01-01 RX ADMIN — HYDROMORPHONE HYDROCHLORIDE 0.5 MILLIGRAM(S): 2 INJECTION INTRAMUSCULAR; INTRAVENOUS; SUBCUTANEOUS at 17:30

## 2023-01-01 RX ADMIN — LIDOCAINE 1 PATCH: 4 CREAM TOPICAL at 12:42

## 2023-01-01 RX ADMIN — Medication 0.5 MILLIGRAM(S): at 10:05

## 2023-01-01 RX ADMIN — DAPTOMYCIN 126 MILLIGRAM(S): 500 INJECTION, POWDER, LYOPHILIZED, FOR SOLUTION INTRAVENOUS at 23:47

## 2023-01-01 RX ADMIN — Medication 1 TABLET(S): at 09:49

## 2023-01-01 RX ADMIN — Medication 5 UNIT(S): at 07:42

## 2023-01-01 RX ADMIN — Medication 40 MILLIGRAM(S): at 13:25

## 2023-01-01 RX ADMIN — Medication 1 TABLET(S): at 17:49

## 2023-01-01 RX ADMIN — Medication 400 MILLIGRAM(S): at 15:43

## 2023-01-01 RX ADMIN — DAPTOMYCIN 126 MILLIGRAM(S): 500 INJECTION, POWDER, LYOPHILIZED, FOR SOLUTION INTRAVENOUS at 00:17

## 2023-01-01 RX ADMIN — Medication 400 MILLIGRAM(S): at 14:09

## 2023-01-01 RX ADMIN — LIDOCAINE 1 PATCH: 4 CREAM TOPICAL at 00:02

## 2023-01-01 RX ADMIN — CHLORHEXIDINE GLUCONATE 1 APPLICATION(S): 213 SOLUTION TOPICAL at 06:04

## 2023-01-01 RX ADMIN — MEROPENEM 100 MILLIGRAM(S): 1 INJECTION INTRAVENOUS at 02:02

## 2023-01-01 RX ADMIN — Medication 5 UNIT(S): at 17:34

## 2023-01-01 RX ADMIN — PANTOPRAZOLE SODIUM 40 MILLIGRAM(S): 20 TABLET, DELAYED RELEASE ORAL at 06:19

## 2023-01-01 RX ADMIN — MUPIROCIN 1 APPLICATION(S): 20 OINTMENT TOPICAL at 05:21

## 2023-01-01 RX ADMIN — Medication 525 MILLIGRAM(S): at 06:02

## 2023-01-01 RX ADMIN — Medication 25 MILLIGRAM(S): at 13:56

## 2023-01-01 RX ADMIN — Medication 40 MILLIGRAM(S): at 06:19

## 2023-01-01 RX ADMIN — Medication 20 MILLIGRAM(S): at 16:16

## 2023-01-01 RX ADMIN — Medication 40 MILLIGRAM(S): at 21:14

## 2023-01-01 RX ADMIN — Medication 1000 UNIT(S): at 11:52

## 2023-01-01 RX ADMIN — Medication 10 MILLIGRAM(S): at 21:24

## 2023-01-01 RX ADMIN — Medication 7 UNIT(S): at 08:20

## 2023-01-01 RX ADMIN — Medication 650 MILLIGRAM(S): at 00:28

## 2023-01-01 RX ADMIN — HYDROMORPHONE HYDROCHLORIDE 0.5 MILLIGRAM(S): 2 INJECTION INTRAMUSCULAR; INTRAVENOUS; SUBCUTANEOUS at 16:47

## 2023-01-01 RX ADMIN — LIDOCAINE 1 PATCH: 4 CREAM TOPICAL at 11:17

## 2023-01-01 RX ADMIN — Medication 25 GRAM(S): at 05:13

## 2023-01-01 RX ADMIN — Medication 1 TABLET(S): at 17:09

## 2023-01-01 RX ADMIN — ENOXAPARIN SODIUM 90 MILLIGRAM(S): 100 INJECTION SUBCUTANEOUS at 05:11

## 2023-01-01 RX ADMIN — HYDROMORPHONE HYDROCHLORIDE 0.5 MILLIGRAM(S): 2 INJECTION INTRAMUSCULAR; INTRAVENOUS; SUBCUTANEOUS at 17:45

## 2023-01-01 RX ADMIN — Medication 25 MILLIGRAM(S): at 15:41

## 2023-01-01 RX ADMIN — ATORVASTATIN CALCIUM 20 MILLIGRAM(S): 80 TABLET, FILM COATED ORAL at 22:19

## 2023-01-01 RX ADMIN — Medication 400 MILLIGRAM(S): at 13:48

## 2023-01-01 RX ADMIN — MORPHINE SULFATE 0.5 MILLIGRAM(S): 50 CAPSULE, EXTENDED RELEASE ORAL at 22:33

## 2023-01-01 RX ADMIN — Medication 1: at 08:57

## 2023-01-01 RX ADMIN — BUMETANIDE 2 MILLIGRAM(S): 0.25 INJECTION INTRAMUSCULAR; INTRAVENOUS at 21:21

## 2023-01-01 RX ADMIN — Medication 40 MILLIGRAM(S): at 17:35

## 2023-01-01 RX ADMIN — Medication 10 MILLIGRAM(S): at 23:08

## 2023-01-01 RX ADMIN — PANTOPRAZOLE SODIUM 40 MILLIGRAM(S): 20 TABLET, DELAYED RELEASE ORAL at 06:12

## 2023-01-01 RX ADMIN — Medication 40 MILLIGRAM(S): at 13:31

## 2023-01-01 RX ADMIN — HYDROMORPHONE HYDROCHLORIDE 0.5 MILLIGRAM(S): 2 INJECTION INTRAMUSCULAR; INTRAVENOUS; SUBCUTANEOUS at 22:34

## 2023-01-01 RX ADMIN — Medication 40 MILLIGRAM(S): at 23:24

## 2023-01-01 RX ADMIN — HEPARIN SODIUM 1700 UNIT(S)/HR: 5000 INJECTION INTRAVENOUS; SUBCUTANEOUS at 08:28

## 2023-01-01 RX ADMIN — CHLORHEXIDINE GLUCONATE 1 APPLICATION(S): 213 SOLUTION TOPICAL at 05:50

## 2023-01-01 RX ADMIN — Medication 1 SPRAY(S): at 17:00

## 2023-01-01 RX ADMIN — DAPTOMYCIN 126 MILLIGRAM(S): 500 INJECTION, POWDER, LYOPHILIZED, FOR SOLUTION INTRAVENOUS at 12:43

## 2023-01-01 RX ADMIN — Medication 2: at 12:06

## 2023-01-01 RX ADMIN — Medication 1 TABLET(S): at 12:21

## 2023-01-01 RX ADMIN — Medication 100 MILLIEQUIVALENT(S): at 03:03

## 2023-01-01 RX ADMIN — MEROPENEM 100 MILLIGRAM(S): 1 INJECTION INTRAVENOUS at 08:45

## 2023-01-01 RX ADMIN — Medication 25 GRAM(S): at 17:21

## 2023-01-01 RX ADMIN — LIDOCAINE 1 PATCH: 4 CREAM TOPICAL at 00:27

## 2023-01-01 RX ADMIN — Medication 1 SPRAY(S): at 18:53

## 2023-01-01 RX ADMIN — CHLORHEXIDINE GLUCONATE 1 APPLICATION(S): 213 SOLUTION TOPICAL at 06:18

## 2023-01-01 RX ADMIN — Medication 1 TABLET(S): at 12:22

## 2023-01-01 RX ADMIN — INSULIN GLARGINE 14 UNIT(S): 100 INJECTION, SOLUTION SUBCUTANEOUS at 22:31

## 2023-01-01 RX ADMIN — HYDROMORPHONE HYDROCHLORIDE 0.5 MILLIGRAM(S): 2 INJECTION INTRAMUSCULAR; INTRAVENOUS; SUBCUTANEOUS at 10:43

## 2023-01-01 RX ADMIN — Medication 0.5 MILLIGRAM(S): at 22:01

## 2023-01-01 RX ADMIN — PANTOPRAZOLE SODIUM 40 MILLIGRAM(S): 20 TABLET, DELAYED RELEASE ORAL at 17:21

## 2023-01-01 RX ADMIN — CHLORHEXIDINE GLUCONATE 1 APPLICATION(S): 213 SOLUTION TOPICAL at 06:03

## 2023-01-01 RX ADMIN — Medication 1: at 11:52

## 2023-01-01 RX ADMIN — DEXMEDETOMIDINE HYDROCHLORIDE IN 0.9% SODIUM CHLORIDE 2.32 MICROGRAM(S)/KG/HR: 4 INJECTION INTRAVENOUS at 22:38

## 2023-01-01 RX ADMIN — Medication 7 UNIT(S): at 18:58

## 2023-01-01 RX ADMIN — BUMETANIDE 1 MILLIGRAM(S): 0.25 INJECTION INTRAMUSCULAR; INTRAVENOUS at 05:28

## 2023-01-01 RX ADMIN — Medication 40 MILLIGRAM(S): at 14:50

## 2023-01-01 RX ADMIN — Medication 7 UNIT(S): at 08:27

## 2023-01-01 RX ADMIN — Medication 40 MILLIGRAM(S): at 21:40

## 2023-01-01 RX ADMIN — Medication 400 MILLIGRAM(S): at 06:12

## 2023-01-01 RX ADMIN — Medication 400 MILLIGRAM(S): at 21:52

## 2023-01-01 RX ADMIN — Medication 1000 MILLIGRAM(S): at 22:00

## 2023-01-01 RX ADMIN — MUPIROCIN 1 APPLICATION(S): 20 OINTMENT TOPICAL at 18:27

## 2023-01-01 RX ADMIN — DEXMEDETOMIDINE HYDROCHLORIDE IN 0.9% SODIUM CHLORIDE 2.32 MICROGRAM(S)/KG/HR: 4 INJECTION INTRAVENOUS at 08:29

## 2023-01-01 RX ADMIN — Medication 25 MILLIGRAM(S): at 14:50

## 2023-01-01 RX ADMIN — Medication 1000 UNIT(S): at 11:50

## 2023-01-01 RX ADMIN — Medication 160 MILLIGRAM(S): at 22:48

## 2023-01-01 RX ADMIN — HEPARIN SODIUM 5000 UNIT(S): 5000 INJECTION INTRAVENOUS; SUBCUTANEOUS at 14:09

## 2023-01-01 RX ADMIN — MUPIROCIN 1 APPLICATION(S): 20 OINTMENT TOPICAL at 17:36

## 2023-01-01 RX ADMIN — Medication 10 MILLIGRAM(S): at 08:45

## 2023-01-01 RX ADMIN — Medication 2: at 11:59

## 2023-01-01 RX ADMIN — Medication 40 MILLIGRAM(S): at 05:22

## 2023-01-01 RX ADMIN — MORPHINE SULFATE 1 MILLIGRAM(S): 50 CAPSULE, EXTENDED RELEASE ORAL at 20:10

## 2023-01-01 RX ADMIN — Medication 25 GRAM(S): at 10:56

## 2023-01-01 RX ADMIN — Medication 25 MILLIGRAM(S): at 05:12

## 2023-01-01 RX ADMIN — MEROPENEM 100 MILLIGRAM(S): 1 INJECTION INTRAVENOUS at 09:35

## 2023-01-01 RX ADMIN — HYDROMORPHONE HYDROCHLORIDE 0.5 MILLIGRAM(S): 2 INJECTION INTRAMUSCULAR; INTRAVENOUS; SUBCUTANEOUS at 08:39

## 2023-01-01 RX ADMIN — HYDROMORPHONE HYDROCHLORIDE 0.5 MILLIGRAM(S): 2 INJECTION INTRAMUSCULAR; INTRAVENOUS; SUBCUTANEOUS at 10:58

## 2023-01-01 RX ADMIN — Medication 1: at 08:29

## 2023-01-01 RX ADMIN — BUMETANIDE 1 MILLIGRAM(S): 0.25 INJECTION INTRAMUSCULAR; INTRAVENOUS at 05:21

## 2023-01-01 RX ADMIN — Medication 5: at 17:27

## 2023-01-01 RX ADMIN — Medication 40 MILLIGRAM(S): at 06:25

## 2023-01-01 RX ADMIN — Medication 3: at 11:53

## 2023-01-01 RX ADMIN — ATORVASTATIN CALCIUM 20 MILLIGRAM(S): 80 TABLET, FILM COATED ORAL at 21:37

## 2023-01-01 RX ADMIN — Medication 7 UNIT(S): at 08:30

## 2023-01-01 RX ADMIN — Medication 0.5 MILLIGRAM(S): at 09:42

## 2023-01-01 RX ADMIN — Medication 40 MILLIGRAM(S): at 05:45

## 2023-01-01 RX ADMIN — LIDOCAINE 1 PATCH: 4 CREAM TOPICAL at 20:42

## 2023-01-01 RX ADMIN — ATORVASTATIN CALCIUM 20 MILLIGRAM(S): 80 TABLET, FILM COATED ORAL at 22:40

## 2023-01-01 RX ADMIN — LIDOCAINE 1 PATCH: 4 CREAM TOPICAL at 12:46

## 2023-01-01 RX ADMIN — Medication 5 UNIT(S): at 17:10

## 2023-01-01 RX ADMIN — HYDROMORPHONE HYDROCHLORIDE 0.5 MILLIGRAM(S): 2 INJECTION INTRAMUSCULAR; INTRAVENOUS; SUBCUTANEOUS at 16:12

## 2023-01-01 RX ADMIN — DULOXETINE HYDROCHLORIDE 30 MILLIGRAM(S): 30 CAPSULE, DELAYED RELEASE ORAL at 13:22

## 2023-01-01 RX ADMIN — Medication 10 MILLIGRAM(S): at 21:20

## 2023-01-01 RX ADMIN — Medication 10 MILLIGRAM(S): at 21:17

## 2023-01-01 RX ADMIN — Medication 40 MILLIEQUIVALENT(S): at 13:22

## 2023-01-01 RX ADMIN — MEROPENEM 100 MILLIGRAM(S): 1 INJECTION INTRAVENOUS at 00:56

## 2023-01-01 RX ADMIN — ENOXAPARIN SODIUM 90 MILLIGRAM(S): 100 INJECTION SUBCUTANEOUS at 05:25

## 2023-01-01 RX ADMIN — Medication 1000 UNIT(S): at 12:07

## 2023-01-01 RX ADMIN — PANTOPRAZOLE SODIUM 40 MILLIGRAM(S): 20 TABLET, DELAYED RELEASE ORAL at 17:46

## 2023-01-01 RX ADMIN — Medication 1 TABLET(S): at 08:19

## 2023-01-01 RX ADMIN — HYDROMORPHONE HYDROCHLORIDE 0.5 MILLIGRAM(S): 2 INJECTION INTRAMUSCULAR; INTRAVENOUS; SUBCUTANEOUS at 20:55

## 2023-01-01 RX ADMIN — MEROPENEM 100 MILLIGRAM(S): 1 INJECTION INTRAVENOUS at 01:54

## 2023-01-01 RX ADMIN — Medication 40 MILLIGRAM(S): at 17:22

## 2023-01-01 RX ADMIN — HYDROMORPHONE HYDROCHLORIDE 0.5 MILLIGRAM(S): 2 INJECTION INTRAMUSCULAR; INTRAVENOUS; SUBCUTANEOUS at 21:30

## 2023-01-01 RX ADMIN — Medication 400 MILLIGRAM(S): at 13:23

## 2023-01-01 RX ADMIN — Medication 1 TABLET(S): at 13:27

## 2023-01-01 RX ADMIN — Medication 7 UNIT(S): at 12:05

## 2023-01-01 RX ADMIN — ENOXAPARIN SODIUM 90 MILLIGRAM(S): 100 INJECTION SUBCUTANEOUS at 17:49

## 2023-01-01 RX ADMIN — INSULIN GLARGINE 14 UNIT(S): 100 INJECTION, SOLUTION SUBCUTANEOUS at 22:18

## 2023-01-01 RX ADMIN — LIDOCAINE 1 PATCH: 4 CREAM TOPICAL at 11:51

## 2023-04-03 PROBLEM — Z00.00 ENCOUNTER FOR PREVENTIVE HEALTH EXAMINATION: Status: ACTIVE | Noted: 2023-01-01

## 2023-05-09 NOTE — H&P ADULT - NSHPLABSRESULTS_GEN_ALL_CORE
Vital Signs Last 24 Hrs  T(C): 36.8 (09 May 2023 23:10), Max: 36.8 (09 May 2023 23:10)  T(F): 98.3 (09 May 2023 23:10), Max: 98.3 (09 May 2023 23:10)  HR: 107 (09 May 2023 23:14) (107 - 107)  BP: 104/63 (09 May 2023 23:10) (104/63 - 104/63)  BP(mean): --  RR: 29 (09 May 2023 23:14) (29 - 29)  SpO2: 100% (09 May 2023 23:14) (100% - 100%)    Parameters below as of 09 May 2023 23:14  Patient On (Oxygen Delivery Method): nasal cannula, high flow  O2 Flow (L/min): 50  O2 Concentration (%): 100    PHYSICAL EXAM:  GENERAL: NAD, well-groomed, well-developed  HEAD:  Atraumatic, Normocephalic  EYES: EOMI, PERRLA, conjunctiva and sclera clear  ENMT: No tonsillar erythema, exudates, or enlargement; Moist mucous membranes  NECK: Supple, No JVD, Normal thyroid  HEART: Regular rate and rhythm; No murmurs, rubs, or gallops  RESPIRATORY: CTA B/L, No W/R/R  ABDOMEN: Soft, Nontender, Nondistended; Bowel sounds present  NEUROLOGY: A&Ox3, nonfocal, moving all extremities  EXTREMITIES: No clubbing, cyanosis, or edema  SKIN: warm, dry, normal color, no rash or abnormal lesions Vital Signs Last 24 Hrs  T(C): 36.8 (09 May 2023 23:10), Max: 36.8 (09 May 2023 23:10)  T(F): 98.3 (09 May 2023 23:10), Max: 98.3 (09 May 2023 23:10)  HR: 107 (09 May 2023 23:14) (107 - 107)  BP: 104/63 (09 May 2023 23:10) (104/63 - 104/63)  BP(mean): --  RR: 29 (09 May 2023 23:14) (29 - 29)  SpO2: 100% (09 May 2023 23:14) (100% - 100%)    Parameters below as of 09 May 2023 23:14  Patient On (Oxygen Delivery Method): nasal cannula, high flow  O2 Flow (L/min): 50  O2 Concentration (%): 100    PHYSICAL EXAM:  GENERAL: in notable distress   HEAD:  Atraumatic, Normocephalic  EYES: EOMI, PERRLA, conjunctiva and sclera clear  ENMT: No tonsillar erythema, exudates, or enlargement; Moist mucous membranes  NECK: Supple, No JVD, Normal thyroid  HEART: Regular rate and rhythm; No murmurs, rubs, or gallops  RESPIRATORY: B/l course breath sounds   ABDOMEN: Soft, Nontender, Nondistended; Bowel sounds present  NEUROLOGY: A&Ox3, nonfocal, moving all extremities  EXTREMITIES: No clubbing, cyanosis, or edema

## 2023-05-09 NOTE — H&P ADULT - NSHPPHYSICALEXAM_GEN_ALL_CORE
Vital Signs Last 24 Hrs  T(C): 36.8 (09 May 2023 23:10), Max: 36.8 (09 May 2023 23:10)  T(F): 98.3 (09 May 2023 23:10), Max: 98.3 (09 May 2023 23:10)  HR: 107 (09 May 2023 23:14) (107 - 107)  BP: 104/63 (09 May 2023 23:10) (104/63 - 104/63)  BP(mean): --  RR: 29 (09 May 2023 23:14) (29 - 29)  SpO2: 100% (09 May 2023 23:14) (100% - 100%)    Parameters below as of 09 May 2023 23:14  Patient On (Oxygen Delivery Method): nasal cannula, high flow  O2 Flow (L/min): 50  O2 Concentration (%): 100  PHYSICAL EXAM:  GENERAL: NAD, well-groomed, well-developed  HEAD:  Atraumatic, Normocephalic  EYES: EOMI, PERRLA, conjunctiva and sclera clear  ENMT: No tonsillar erythema, exudates, or enlargement; Moist mucous membranes  NECK: Supple, No JVD, Normal thyroid  HEART: Regular rate and rhythm; No murmurs, rubs, or gallops  RESPIRATORY: CTA B/L, No W/R/R  ABDOMEN: Soft, Nontender, Nondistended; Bowel sounds present  NEUROLOGY: A&Ox3, nonfocal, moving all extremities  EXTREMITIES: No clubbing, cyanosis, or edema  SKIN: warm, dry, normal color, no rash or abnormal lesions

## 2023-05-09 NOTE — H&P ADULT - ATTENDING COMMENTS
Patient seen and examined. Patient critically ill requiring frequent bedside visits with therapy change. Patient is a 62F with extensive history including DM2, HTN, HLD, obesity, non-obstructive CAD, ALMA on CPAP, and ILD (NSIP) thought secondary to prior Methotrexate use, RA and Psoriatic arthritis who is transferred from [facility] for acute hypoxemic respiratory failure in the setting of recent MRSA bacteremia and suspected ILD exacerbation.      #Pulmonary - acute hypoxemic respiratory failure  - Check CXR and RVP. Will need to review CT images from [facility] - last CT reported from 4/24/23  - Continue HFNC 60L/100% and taper - was on 40L/70% at [facility] alternating with BIPAP 12/5 70%  - Check ABG to determine A-a gradient and monitor PaO2  - Maintain O2 sat > 90% as able  - Bronchodilators  - Continue steroids for ILD with PCP prophylaxis. Check fungitell as it is unclear whether patient was on PCP prophylaxis at [facility] or [facility]  - Pulmonary hypertension - repeat echocardiogram - patient has not been on any therapy for this per family (no longer on Sildenafil)  - Will need history from Dr. Kim at [facility]- who has reportedly been caring for patient  - May ultimately need RHC. Patient does not look grossly volume overloaded on exam today  - If no clear explanation for hypoxemia will need to consider CTPA as patient has been hospitalized on and off since early April    #Infectious Disease - patient with reported MRSA bacteremia  - continue current antibiotics: Dapto, Rifampin, and Meropenem  - Will need to obtain culture results from [facility]  - Repeat cultures here. No indwelling lines or muñiz  - Check RVP  - Check Fungitell  - Start PCP prophylaxis with Mepron    #Cardiac - nonobstructive CAD, reported diastolic dysfunction, and pulmonary hypertension  - Hold statin pending blood work  - echocardiogram  - appears euvolemic at this time - monitor daily need for diuresis. Was reportedly on Torsemide    #Endocrine  - Continue Lantus as dosed from [facility]  - Check FS and A1c  - Diabetic diet    #Gastro - bedside dysphagia screen and if passes can start diet with aspiration precautions  - Was reportedly on PPI at home - will continue  - Bowel regimen    #DVT proph - HSQ or Lovenox based on labs - was on Lovenox at [facility]    Patient is at high risk for clinical deterioration    GOC: Full Code - MOLST from [facility] confirmed with patient  DVT proph: Lovenox  POCUS: not in shock. RV enlargement and grossly normal LV function    Will need to get more collateral in AM to obtain a better understanding regarding patient's pulmonary care. I spoke with patient, her , and her daughter today.

## 2023-05-09 NOTE — H&P ADULT - NSHPREVIEWOFSYSTEMS_GEN_ALL_CORE
REVIEW OF SYSTEMS:  Constitutional: [+] fevers, [ -] chills, [- ] weight loss, [- ] weight gain  HEENT: [ -] vision problems, [- ] eye pain, [ -] nasal congestion, [- ] rhinorrhea, [- ] sore throat, [- ] dysphagia  CV: [- ] chest pain, [+ ] orthopnea, [- ] palpitations  Resp: [- ] cough, [+ ] dyspnea, [- ] wheezing, [ -] hemoptysis  GI: [- ] nausea, [- ] vomiting, [- ] diarrhea, [ -] constipation, [- ] abdominal pain  : [- ] dysuria [- ] nocturia [- ] hematuria [ -] increased urinary frequency  Musculoskeletal: [- ] back pain [ +] myalgias [+ ] arthralgias [- ] fracture  Skin: [- ] rash [ -] itch  Neurological: [ -] headache [- ] dizziness [- ] syncope [+ ] weakness [- ] numbness

## 2023-05-09 NOTE — H&P ADULT - HISTORY OF PRESENT ILLNESS
62 year old female with a PMHx of IDL on 3.5L home O2/ CPAP and chronic prednisone, pulmonary HTN, T2DM, RA, psoriatic arthirits who initially present to [facility] for worsening dyspnea/ hypoxia requiring HFCA and BIPAP and was transferred to [facility] further managment. She has had multiple recent hospitalizations for acute on chronic hypoxic respirtory failure 2/2 IDL flair/ PNA. During her hospitalization she had a rapid response called (on 4/21) for left facial droop and AMS c/f possible stroke. Imagining at the time demonstated high grade L sided carotid stenosis for which pt and family decided not to persue surgical intervention.   Pt was then found to be febrile and tachycardic, and was tranferred to ICU for sepsis with multifactorial encephalopathy and AHRF. Pts blood cultures grew MRSA and she was initially treated with vancomycin (4/21-4/25 and 5/3-5/8) but continued to have positive blood cultures x5. She was also started on Merrem (4/21-4/23) and subsequently started on Daptomycin on 4/25 with dose increase on 5/1. Plan was to continue the Daptomycin for a 6 week course and Rifampin (started on 5/1) for a 14 day course. Of note, pt had negative blood cx x2 on 5/4/23. Pt was found to be febrile again on 5/8 with a rectal temp of 100.7. Fungitell was ordered and pt was started on Meropenem. Possible suspected source was superficial thrombophelbitis (seen on U/S in the left cephalic vein). ID recommended NM whole body scan/ indium scan and ELICEO to r/o other source of persistent bacteremia, however pt was unable to lie flat given her respiratory status and these studies were not persued. (At the time of transfer, pt remained on dapto, abeba, and rifampine)  Pt hospital course was further complicated by hypercalcemia likely PTH-mediated ISO a L parathyriod adenoma. She was started on IVF and given Alendronate x1 with improvement. Palliative care was also consulted for multimodal pain management for diffuse arthralgias/myalgias possible 2/2 RA vs fibromyalgia     62 year old female with a PMHx of IDL on 3.5L home O2, ALMA on CPAP and chronic prednisone, pulmonary HTN, T2DM, RA, psoriatic arthritis who initially present to [facility] for worsening dyspnea/ hypoxia requiring HFCA and BIPAP and was transferred to [facility] further management She has had multiple recent hospitalizations for acute on chronic hypoxic respiratory failure 2/2 IDL flair/ PNA. During her hospitalization she had a rapid response called (on 4/21) for left facial droop and AMS c/f possible stroke. Imagining at the time demonstrated high grade L sided carotid stenosis for which pt and family decided not to pursue surgical intervention.   Pt was then found to be febrile and tachycardic, and was transferred to ICU for sepsis with multifactorial encephalopathy and AHRF. Pts blood cultures grew MRSA and she was initially treated with vancomycin (4/21-4/25 and 5/3-5/8) but continued to have positive blood cultures x5. She was also started on Merrem (4/21-4/23) and subsequently started on Daptomycin on 4/25 with dose increase on 5/1. Plan was to continue the Daptomycin for a 6 week course and Rifampin (started on 5/1) for a 14 day course. Of note, pt had negative blood cx x2 on 5/4/23. Pt was found to be febrile again on 5/8 with a rectal temp of 100.7. Fungitell was ordered and pt was started on Meropenem. Possible suspected source was superficial thrombophlebitis (seen on U/S in the left cephalic vein). ID recommended NM whole body scan/ indium scan and ELICEO to r/o other source of persistent bacteremia, however pt was unable to lie flat given her respiratory status and these studies were not pursued (At the time of transfer, pt remained on dapto, abeba, and rifampine)  Pt hospital course was further complicated by hypercalcemia likely PTH-mediated ISO a L parathyroid adenoma. She was started on IVF and given Alendronate x1 with improvement. Palliative care was also consulted for multimodal pain management for diffuse arthralgias/myalgias possible 2/2 RA vs fibromyalgia

## 2023-05-09 NOTE — CHART NOTE - NSCHARTNOTEFT_GEN_A_CORE
I spoke with patient and her  (Amin (Miles)) in the MICU. Patient is full code.     They asked that we contact either patient's sister (Emi [phone]) or patient's daughter (Joyce [phone]) with any medical questions or concerns as they help manage patients medical issues.    I attempted to call Emi this evening but the call went to [facility]. I called and spoke with Jyoce. She was able to add that her mother was thought to have ILD related to her prior Methotrexate use. She has never had a lung biopsy. Additionally, she told me that she has been having trouble to get in to see a pulmonary hypertension specialist and has not been on any therapy for this. She was previously given Sildenafil 20mg TID from a hospitalization at [facility] but this was stopped about 1 month ago. The last cardiac cath was done at [place] in December 2022 which revealed a PCWP 21, mPAP 47, and PVR 5.5 I think (this will need to be confirmed).     The patient was recently referred to Dr. Keshia Kim at [facility] for her pulmonary disease process. I believe Dr. Kim runs the Pul[facility] Hypert[facility] at [facility] but we will need to try and obtain more information from her office tomorrow when the office opens. [phone].    Joyce was able to add that the patient had another fever today and was noted to have an increasing WBC count which is why Meropenem was added on to Daptomycin and Rifampin which the patient was getting for a prior MRSA infection. The patient has not had any surgeries and does not have any hardware in her body.    She does use home O2 3.5L daily and CPAP QHS for ALMA reportedly 4 cm H2O.

## 2023-05-09 NOTE — H&P ADULT - NSICDXPASTMEDICALHX_GEN_ALL_CORE_FT
PAST MEDICAL HISTORY:  H/O pulmonary hypertension     HTN (hypertension)     ILD (interstitial lung disease)     ALMA (obstructive sleep apnea)     Type 2 diabetes mellitus

## 2023-05-09 NOTE — CHART NOTE - NSCHARTNOTEFT_GEN_A_CORE
: Jamey Duncan MD    INDICATION: Acute Hyp    PROCEDURE:  [ x] LIMITED ECHO  [x ] LIMITED CHEST      FINDINGS:  Lungs: Bilateral B-line pattern with irregular pleural surface. No significant pleural effusions  Cardiac: Difficult views given tachypnea - enlarged RV, grossly normal LV function, septal flattening in both systole and diastole, no significant pericardial effusion      INTERPRETATION:  B-line pattern with irregular pleural suggestive of interstitial lung abnormality. No significant pleural effusion  Enlarged RV septal flattening throughout cardiac cycle. Grossly normal LV function.      Images saved to [employer] : Jamey Duncan MD    INDICATION: Acute Hypoxemic Respiratory Failure    PROCEDURE:  [ x] LIMITED ECHO  [x ] LIMITED CHEST      FINDINGS:  Lungs: Bilateral B-line pattern with irregular pleural surface. No significant pleural effusions  Cardiac: Difficult views given tachypnea - enlarged RV, grossly normal LV function, septal flattening in both systole and diastole, no significant pericardial effusion      INTERPRETATION:  B-line pattern with irregular pleural suggestive of interstitial lung abnormality. No significant pleural effusion  Enlarged RV septal flattening throughout cardiac cycle. Grossly normal LV function.      Images saved to [employer]

## 2023-05-09 NOTE — H&P ADULT - ASSESSMENT
62 year old female with a PMHx of IDL on 3.5L home O2/ CPAP and chronic prednisone, pulmonary HTN, T2DM, RA, psoriatic arthirits who initially present to [facility] for worsening dyspnea/ hypoxia requiring HFCA and BIPAP and was transferred to [facility] further managment. Her hospital course was complicated by MRSA bactermia (on dapto, abeba, and rifampine) and high grade L sided carotid stenosis for which pt and family decided not to persue surgical intervention.     NEURO  #High grade L sided carotid stenosis  - Pt has rapid response called on 4/21 at OSH for AMS and L facial droop   - Imaging revealed high grade L sided carotid stenosis for which pt and family decided not to persue surgical intervention    #Pain management  - Palliative care was consulted at OSH for multimodal pain management for diffuse arthralgias/myalgias possible 2/2 RA vs fibromyalgia    - c/w Cymbalta, amitriptyline, lidocaine patch, naproxen, tylenol     CARDIOVASCULAR  #HTN   - c/w metoprolol tartrate  - holding torsemide for now as pt appears Euvolemic    - F/u TTE     RESPIRATORY  #ILD   #pulmonary HTN   - c/w prednisone 40 qd   - c/w HFNC, wean as tolerated   - c/w BIPAP at night   - Per pt, she has not been taking sildenafil     GI/NUTRITION  #Diet   - NPO for now     /RENAL  - No active issues     ID  #MSSA Bacteremia   - Per paper chart review, pt with recent complicated MSSA bacteremia - being treated with Dapto and rifampin   - Had negative blood cx x2 on 5/4/23 but spiked a temp on 5/8 and Meropenem was added   - c/w Dapto, Abeba, and rifampin   - ID at OSH had recommended NM whole body scan/ indium scan and ELICEO to r/o other source of persistent bacteremia    HEMATOLOGIC/DVT ppx  #Anemia   - f/u iron studies     #DVT ppx  - lovenox     ENDOCRINE  #T2DM   - Lantus 12, admelog 5 premeal + ISS    Ethics/ICU Bundle  - Full code  62 year old female with a PMHx of IDL on 3.5L home O2, AMLA on CPAP and chronic prednisone, pulmonary HTN, T2DM, RA, psoriatic arthritis who initially present to [facility] for worsening dyspnea/ hypoxia requiring HFCA and BIPAP and was transferred to [facility] further management Her hospital course was complicated by MRSA bactermia (on dapto, abeba, and rifampine) and high grade L sided carotid stenosis for which pt and family decided not to persue surgical intervention.     NEURO  #High grade L sided carotid stenosis  - Pt has rapid response called on 4/21 at OSH for AMS and L facial droop   - Imaging revealed high grade L sided carotid stenosis for which pt and family decided not to persue surgical intervention    #Pain management  - Palliative care was consulted at OSH for multimodal pain management for diffuse arthralgias/myalgias possible 2/2 RA vs fibromyalgia    - c/w Cymbalta, amitriptyline, lidocaine patch, naproxen, tylenol     CARDIOVASCULAR  #HTN   # nonobstructive CAD   - c/w metoprolol tartrate  - holding nifedipine given normotension   - holding torsemide for now as pt appears Euvolemic    - F/u TTE     RESPIRATORY  #ILD (?2/2 methotrexate use)  #pulmonary HTN   -  Was previously given Sildenafil 20mg TID from a hospitalization at [facility] but this was stopped about 1 month ago  - follows with Dr. Kim at [facility], call in the AM for collateral   - Plan for possible RHC pending clinical course   - c/w prednisone 40 qd   - c/w HFNC, wean as tolerated   - c/w BIPAP at night   - f/u CXR and RVP     GI/NUTRITION  #Diet   - NPO pending bedside swallow     /RENAL  - No active issues     ID  #MSSA Bacteremia   - Per paper chart review, pt with recent complicated MSSA bacteremia - being treated with Dapto and rifampin   - Had negative blood cx x2 on 5/4/23 but spiked a temp on 5/8 and Meropenem was added   - c/w Dapto, Abeba, and rifampin   - started PCP prophylaxis with Mepron  - f/u blood and urine culture   - ID at OSH had recommended NM whole body scan/ indium scan and ELICEO to r/o other source of persistent bacteremia  - f/u fungitell     HEMATOLOGIC/DVT ppx  #Anemia   - f/u iron studies     #DVT ppx  - lovenox     ENDOCRINE  #T2DM   - Lantus 12, admelog 5 premeal + ISS     # Hypercalcemia  - Pt with hx of hypercalcemia at OSH  likely PTH-mediated ISO a ?L parathyroid adenoma  - Was started on IVF and given Alendronate x1 with improvement.  - CTM     Ethics/ICU Bundle  - Full code  62 year old female with a PMHx of IDL on 3.5L home O2, ALMA on CPAP and chronic prednisone, pulmonary HTN, T2DM, RA, psoriatic arthritis who initially present to [facility] for worsening dyspnea/ hypoxia requiring HFCA and BIPAP and was transferred to [facility] further management Her hospital course was complicated by MRSA bactermia (on dapto, abeba, and rifampine) and high grade L sided carotid stenosis for which pt and family decided not to persue surgical intervention.     NEURO  #High grade L sided carotid stenosis  - Pt has rapid response called on 4/21 at OSH for AMS and L facial droop   - Imaging revealed high grade L sided carotid stenosis for which pt and family decided not to persue surgical intervention    #Pain management  - Palliative care was consulted at OSH for multimodal pain management for diffuse arthralgias/myalgias possible 2/2 RA vs fibromyalgia    - c/w Cymbalta, amitriptyline, lidocaine patch, naproxen, tylenol     CARDIOVASCULAR  #HTN   # nonobstructive CAD   - c/w metoprolol tartrate  - holding nifedipine given normotension   - holding torsemide for now as pt appears Euvolemic    - F/u TTE     RESPIRATORY  #ILD (?2/2 methotrexate use)  #pulmonary HTN   -  Was previously given Sildenafil 20mg TID from a hospitalization at [facility] but this was stopped about 1 month ago  - follows with Dr. Kim at [facility], call in the AM for collateral   - Plan for possible RHC pending clinical course   - c/w prednisone 40 qd (on mepron for pcp ppx)  - c/w HFNC, wean as tolerated   - c/w BIPAP at night   - f/u CXR and RVP     GI/NUTRITION  #Diet   - NPO pending bedside swallow     /RENAL  - No active issues     ID  #MSSA Bacteremia   - Per paper chart review, pt with recent complicated MSSA bacteremia - being treated with Dapto and rifampin   - Had negative blood cx x2 on 5/4/23 but spiked a temp on 5/8 and Meropenem was added   - c/w Dapto, Abeba, and rifampin   - started PCP prophylaxis with Mepron  - f/u blood and urine culture   - ID at OSH had recommended NM whole body scan/ indium scan and ELICEO to r/o other source of persistent bacteremia  - f/u fungitell     HEMATOLOGIC/DVT ppx  #Anemia   - f/u iron studies     #DVT ppx  - lovenox     ENDOCRINE  #T2DM   - Lantus 12, admelog 5 premeal + ISS     # Hypercalcemia  - Pt with hx of hypercalcemia at OSH  likely PTH-mediated ISO a ?L parathyroid adenoma  - Was started on IVF and given Alendronate x1 with improvement.  - CTM     Ethics/ICU Bundle  - Full code  62 year old female with a PMHx of IDL on 3.5L home O2, ALMA on CPAP and chronic prednisone, pulmonary HTN, T2DM, RA, psoriatic arthritis who initially present to [facility] for worsening dyspnea/ hypoxia requiring HFCA and BIPAP and was transferred to [facility] further management Her hospital course was complicated by MRSA bactermia (on dapto, abeba, and rifampine) and high grade L sided carotid stenosis for which pt and family decided not to persue surgical intervention.     NEURO  #High grade L sided carotid stenosis  - Pt has rapid response called on 4/21 at OSH for AMS and L facial droop   - Imaging revealed high grade L sided carotid stenosis for which pt and family decided not to persue surgical intervention    #Pain management  - Palliative care was consulted at OSH for multimodal pain management for diffuse arthralgias/myalgias possible 2/2 RA vs fibromyalgia    - c/w Cymbalta, amitriptyline, lidocaine patch, naproxen, tylenol     CARDIOVASCULAR  #HTN   # nonobstructive CAD   - c/w metoprolol tartrate  - holding nifedipine given normotension   - holding torsemide for now as pt appears Euvolemic    - F/u TTE     RESPIRATORY  #ILD (?2/2 methotrexate use)  #pulmonary HTN   -  Was previously given Sildenafil 20mg TID from a hospitalization at [facility] but this was stopped about 1 month ago  - follows with Dr. Kim at [facility], call in the AM for collateral   - Plan for possible RHC pending clinical course   - c/w prednisone 40 qd (on mepron for pcp ppx)  - c/w HFNC, wean as tolerated   - c/w BIPAP at night   - f/u CXR and RVP     GI/NUTRITION  #Diet   - NPO pending bedside swallow     /RENAL  - No active issues     ID  #MSSA Bacteremia   - Per paper chart review, pt with recent complicated MSSA bacteremia - being treated with Dapto and rifampin   - Had negative blood cx x2 on 5/4/23 but spiked a temp on 5/8 and Meropenem was added   - c/w Dapto, Abeba, and rifampin   - started PCP prophylaxis with Mepron  - f/u blood and urine culture   - ID at OSH had recommended NM whole body scan/ indium scan and ELICEO to r/o other source of persistent bacteremia  - f/u fungitell   - consider ID consult     HEMATOLOGIC/DVT ppx  #Anemia   - f/u iron studies     #DVT ppx  - lovenox     ENDOCRINE  #T2DM   - Lantus 12, admelog 5 premeal + ISS     # Hypercalcemia  - Pt with hx of hypercalcemia at OSH  likely PTH-mediated ISO a ?L parathyroid adenoma  - Was started on IVF and given Alendronate x1 with improvement.  - CTM     Ethics/ICU Bundle  - Full code

## 2023-05-10 NOTE — CONSULT NOTE ADULT - ASSESSMENT
WORK UP      ANTIBIOTIC      DIAGNOSIS and IMPRESSION        RECOMMENDATIONS        PT TO BE SEEN. PRELIM NOTE  PENDING RECS. PLEASE WAIT FOR FINAL RECS AFTER DISCUSSION WITH ATTENDING#    Stanford Bond DO, PGY-4   ID fellow  [employer] Preferred  After 5pm/weekends call [phone]   62F with ILD on 3.5L home O2/CPAP and chronic prednisone, pulmonary HTN, DM2, RA, psoriatic arthritis presented to [facility] ED for worsening dyspnea and hypoxia, admitted for ILD with exacerbation and acute on chronic hypoxic respiratory failure. She was treated with high-dose corticosteroids and started on HFNC, course complicated by AMS, found to have demonstrated high grade L-sided carotid stenosis, but family declined surgical intervention, course further complicated by worsening hypoxia requiring BIPAP and fever, transferred to MICU, found to have MRSA bacteremia (4/24), initially treated with Vancomycin, switched to Daptomycin (4/25) with subsequent increased dose on (5/1), Blood culture cleared on (5/4), suspected source is from superficial thrombophlebitis, seen on US (4/17: Superficial thrombus is seen in the left cephalic vein at the level of the forearm. No DVT), planned for 6 weeks course with Rifampin 300mg PO q12 for total of 2 week course, course again complicated by low grade fever 100.7F (5/8), added meropenem for empiric coverage. Eventually patient transferred to [facility], ID consulted for assistance.    Patient reports dyspnea with mild nonproductive cough  Had fever during her stay at [facility] and prior to admission, but has since been afebrile without chills  Denies any cardiac device, hardware, or prosthetic joint replacement  Denies abdominal pain, n/v, diarrhea, dysuria, headache, back pain    WORK UP  No leukocytosis  RVP negative  HIV negative  CXR with diffuse opacity consistent with ILD  CT Chest (4/24) without evidence of pneumonia, stable ILD  US LUE (4/17): Superficial thrombus is seen in the left cephalic vein at the level of the forearm. No DVT  BCx (4/24, 4/25, 4/27, 4/28, 5/1, 5/2) positive for MRSA  BCx (5/4) NGTD    ANTIBIOTIC  atovaquone  Suspension 1500 daily  DAPTOmycin IVPB    meropenem  IVPB 1000 every 8 hours  rifAMPin 300 every 12 hours    DIAGNOSIS and IMPRESSION  #MRSA Bacteremia  #Acute Hypoxic Respiratory Failure 2/2 ILD  #Fever    - bacteremia source suspected L thrombophlebitis   - no evidence of pneumonia on imaging    RECOMMENDATIONS  - discontinue abeba, rifampin   - continue dapto  - continue mepron ppx  - monitor fever curve  - trend WBC  - obtain NM Whole body scan/Indium scan r/o other source  - obtain TTE, may need ELICEO  - follow up BCx x2, Fungitell      PT TO BE SEEN. PRELIM NOTE  PENDING RECS. PLEASE WAIT FOR FINAL RECS AFTER DISCUSSION WITH ATTENDINGCarole Bond DO, PGY-4   ID fellow  [employer] Preferred  After 5pm/weekends call [phone]   62F with ILD on 3.5L home O2/CPAP and chronic prednisone, pulmonary HTN, DM2, RA, psoriatic arthritis presented to [facility] ED for worsening dyspnea and hypoxia, admitted for ILD with exacerbation and acute on chronic hypoxic respiratory failure. She was treated with high-dose corticosteroids and started on HFNC, course complicated by AMS, found to have demonstrated high grade L-sided carotid stenosis, but family declined surgical intervention, course further complicated by worsening hypoxia requiring BIPAP and fever, transferred to MICU, found to have MRSA bacteremia (4/24), initially treated with Vancomycin, switched to Daptomycin (4/25) with subsequent increased dose on (5/1), Blood culture cleared on (5/4), suspected source is from superficial thrombophlebitis, seen on US (4/17: Superficial thrombus is seen in the left cephalic vein at the level of the forearm. No DVT), planned for 6 weeks course with Rifampin 300mg PO q12 for total of 2 week course, course again complicated by low grade fever 100.7F (5/8), added meropenem for empiric coverage. Eventually patient transferred to [facility], ID consulted for assistance.    Patient reports dyspnea with mild nonproductive cough  Had fever during her stay at [facility] [place] and prior to admission, but has since been afebrile without chills  Denies any cardiac device, hardware, or prosthetic joint replacement  Denies abdominal pain, n/v, diarrhea, dysuria, headache, back pain    WORK UP  No leukocytosis  RVP negative  HIV negative  CXR with diffuse opacity consistent with ILD  CT Chest (4/24) without evidence of pneumonia, stable ILD  US LUE (4/17): Superficial thrombus is seen in the left cephalic vein at the level of the forearm. No DVT  BCx (4/24, 4/25, 4/27, 4/28, 5/1, 5/2) positive for MRSA  BCx (5/4) NGTD  TTE (5/10) without obvious vegetation     ANTIBIOTIC  atovaquone  Suspension 1500 daily  DAPTOmycin IVPB    meropenem  IVPB 1000 every 8 hours  rifAMPin 300 every 12 hours    DIAGNOSIS and IMPRESSION  #MRSA Bacteremia  #Acute Hypoxic Respiratory Failure 2/2 ILD  #Fever    - bacteremia source suspected L thrombophlebitis, but still consider endocarditis  - no evidence of pneumonia on imaging, but due to persistent hypoxia, would keep empiric treatment for presumed pneumonia     RECOMMENDATIONS  - continue empiric abeba for now  - continue dapto  - discontinue rifampin   - continue mepron ppx  - monitor fever curve  - trend WBC  - TTE reassuring, will eventually need ELICEO once stable   - NM Whole body scan/Indium scan r/o other source once able  - follow up BCx x2, Fungitell    Case d/w attending and primary team    Stanford Bond DO, PGY-4   ID fellow  [employer] Teams Preferred  After 5pm/weekends call [phone]   62F with ILD on 3.5L home O2/CPAP and chronic prednisone, pulmonary HTN, DM2, RA, psoriatic arthritis presented to [facility] ED for worsening dyspnea and hypoxia, admitted for ILD with exacerbation and acute on chronic hypoxic respiratory failure. She was treated with high-dose corticosteroids and started on HFNC, course complicated by AMS, found to have demonstrated high grade L-sided carotid stenosis, but family declined surgical intervention, course further complicated by worsening hypoxia requiring BIPAP and fever, transferred to MICU, found to have MRSA bacteremia (4/24), initially treated with Vancomycin, switched to Daptomycin (4/25) with subsequent increased dose on (5/1), Blood culture cleared on (5/4), suspected source was from superficial thrombophlebitis, seen on US (4/17: Superficial thrombus is seen in the left cephalic vein at the level of the forearm. No DVT), planned for 6 weeks course with Rifampin 300mg PO q12 for total of 2 week course, course again complicated by low grade fever 100.7F (5/8), added meropenem for empiric coverage. Eventually patient transferred to [facility], ID consulted for assistance.    Patient reports dyspnea with mild nonproductive cough  Had fever during her stay at [facility] and prior to admission, but has since been afebrile without chills  Denies any cardiac device, hardware, or prosthetic joint replacement  Denies abdominal pain, n/v, diarrhea, dysuria, headache, back pain    WORK UP  No leukocytosis  RVP negative  HIV negative  CXR with diffuse opacity consistent with ILD  CT Chest (4/24) without evidence of pneumonia, stable ILD  US LUE (4/17): Superficial thrombus is seen in the left cephalic vein at the level of the forearm. No DVT  BCx (4/24, 4/25, 4/27, 4/28, 5/1, 5/2) positive for MRSA  BCx (5/4) NGTD  TTE (5/10) without obvious vegetation     ANTIBIOTIC  atovaquone  Suspension 1500 daily  DAPTOmycin IVPB    meropenem  IVPB 1000 every 8 hours  rifAMPin 300 every 12 hours    DIAGNOSIS and IMPRESSION  #MRSA Bacteremia  #Acute Hypoxic Respiratory Failure 2/2 ILD  #Fever    - bacteremia source was suspected L thrombophlebitis at [place] , but still consider endocarditis in ddx no obvious joint infection though is a consideration in patient with psoriatic arthritis   - no evidence of pneumonia on imaging, but due to persistent hypoxia, would keep empiric treatment for presumed pneumonia     RECOMMENDATIONS  - continue empiric abeba for now  - continue dapto  - discontinue rifampin   - continue mepron ppx  - monitor fever curve  - trend WBC  - TTE reassuring, will eventually need ELICEO once stable   - NM Whole body scan/Indium scan r/o other source once able  - follow up BCx x2, Fungitell    Case d/w attending and primary team    Stanford Bond DO, PGY-4   ID fellow  [employer] Teams Preferred  After 5pm/weekends call [phone]

## 2023-05-10 NOTE — PROGRESS NOTE ADULT - SUBJECTIVE AND OBJECTIVE BOX
Interval Events:   -transfer from [place] overnight without any complications   -Bipap overnight-->placed on HFNC in AM       HPI:  62 year old female with a PMHx of IDL on 3.5L home O2, ALMA on CPAP and chronic prednisone, pulmonary HTN, T2DM, RA, psoriatic arthritis who initially present to [facility] for worsening dyspnea/ hypoxia requiring HFCA and BIPAP and was transferred to [facility] further management She has had multiple recent hospitalizations for acute on chronic hypoxic respiratory failure 2/2 IDL flair/ PNA. During her hospitalization she had a rapid response called (on 4/21) for left facial droop and AMS c/f possible stroke. Imagining at the time demonstrated high grade L sided carotid stenosis for which pt and family decided not to pursue surgical intervention.   Pt was then found to be febrile and tachycardic, and was transferred to ICU for sepsis with multifactorial encephalopathy and AHRF. Pts blood cultures grew MRSA and she was initially treated with vancomycin (4/21-4/25 and 5/3-5/8) but continued to have positive blood cultures x5. She was also started on Merrem (4/21-4/23) and subsequently started on Daptomycin on 4/25 with dose increase on 5/1. Plan was to continue the Daptomycin for a 6 week course and Rifampin (started on 5/1) for a 14 day course. Of note, pt had negative blood cx x2 on 5/4/23. Pt was found to be febrile again on 5/8 with a rectal temp of 100.7. Fungitell was ordered and pt was started on Meropenem. Possible suspected source was superficial thrombophlebitis (seen on U/S in the left cephalic vein). ID recommended NM whole body scan/ indium scan and ELICEO to r/o other source of persistent bacteremia, however pt was unable to lie flat given her respiratory status and these studies were not pursued (At the time of transfer, pt remained on dapto, abeba, and rifampine)  Pt hospital course was further complicated by hypercalcemia likely PTH-mediated ISO a L parathyroid adenoma. She was started on IVF and given Alendronate x1 with improvement. Palliative care was also consulted for multimodal pain management for diffuse arthralgias/myalgias possible 2/2 RA vs fibromyalgia     (09 May 2023 23:13)      REVIEW OF SYSTEMS:  Constitutional: [ ] negative [ ] fevers [ ] chills [ ] weight loss [ ] weight gain  HEENT: [ ] negative [ ] dry eyes [ ] eye irritation [ ] postnasal drip [ ] nasal congestion  CV: [ ] negative  [ ] chest pain [ ] orthopnea [ ] palpitations [ ] murmur  Resp: [ ] negative [ ] cough [ ] shortness of breath [ ] dyspnea [ ] wheezing [ ] sputum [ ] hemoptysis  GI: [ ] negative [ ] nausea [ ] vomiting [ ] diarrhea [ ] constipation [ ] abd pain [ ] dysphagia   : [ ] negative [ ] dysuria [ ] nocturia [ ] hematuria [ ] increased urinary frequency  Musculoskeletal: [ ] negative [ ] back pain [ ] myalgias [ ] arthralgias [ ] fracture  Skin: [ ] negative [ ] rash [ ] itch  Neurological: [ ] negative [ ] headache [ ] dizziness [ ] syncope [ ] weakness [ ] numbness  Psychiatric: [ ] negative [ ] anxiety [ ] depression  Endocrine: [ ] negative [ ] diabetes [ ] thyroid problem  Hematologic/Lymphatic: [ ] negative [ ] anemia [ ] bleeding problem  Allergic/Immunologic: [ ] negative [ ] itchy eyes [ ] nasal discharge [ ] hives [ ] angioedema  [ ] All other systems negative  [ ] Unable to assess ROS because ________    OBJECTIVE:  ICU Vital Signs Last 24 Hrs  T(C): 36.9 (10 May 2023 04:00), Max: 36.9 (10 May 2023 00:00)  T(F): 98.4 (10 May 2023 04:00), Max: 98.4 (10 May 2023 00:00)  HR: 104 (10 May 2023 06:00) (95 - 107)  BP: 109/85 (10 May 2023 06:00) (90/68 - 126/86)  BP(mean): 90 (10 May 2023 06:00) (73 - 96)  ABP: --  ABP(mean): --  RR: 26 (10 May 2023 06:00) (24 - 30)  SpO2: 97% (10 May 2023 06:00) (94% - 100%)    O2 Parameters below as of 10 May 2023 06:00  Patient On (Oxygen Delivery Method): nasal cannula, high flow  O2 Flow (L/min): 50  O2 Concentration (%): 80          CAPILLARY BLOOD GLUCOSE      POCT Blood Glucose.: 224 mg/dL (10 May 2023 01:06)      Physical Exam:   Constitutional: no acute distress   HEENT: + PERRLA, EOMI, no drainage or redness  Neck: supple,  No JVD  Respiratory:   Cardiovascular: Regular rate, regular rhythm, normal S1, S2; no murmurs or rub  Gastrointestinal: Soft, non-tender, non distended, no hepatosplenomegaly, normal bowel sounds  Extremities: + 2 peripheral edema, no cyanosis, no clubbing   Vascular: Equal and normal pulses: 2+ peripheral pulses throughout  Neurological:   Psychiatric: calm, normal mood, normal affect  Musculoskeletal: No joint swelling or deformity; no limitation of movement  Skin: warm, dry, well perfused, no rashes    HOSPITAL MEDICATIONS:  Standing Meds:  amitriptyline 10 milliGRAM(s) Oral at bedtime  atovaquone  Suspension 1500 milliGRAM(s) Oral daily  chlorhexidine 2% Cloths 1 Application(s) Topical <User Schedule>  cholecalciferol 1000 Unit(s) Oral daily  DAPTOmycin IVPB      dextrose 5%. 1000 milliLiter(s) IV Continuous <Continuous>  dextrose 5%. 1000 milliLiter(s) IV Continuous <Continuous>  dextrose 50% Injectable 12.5 Gram(s) IV Push once  dextrose 50% Injectable 25 Gram(s) IV Push once  dextrose 50% Injectable 25 Gram(s) IV Push once  DULoxetine 30 milliGRAM(s) Oral daily  enoxaparin Injectable 40 milliGRAM(s) SubCutaneous every 24 hours  glucagon  Injectable 1 milliGRAM(s) IntraMuscular once  insulin glargine Injectable (LANTUS) 12 Unit(s) SubCutaneous at bedtime  insulin lispro (ADMELOG) corrective regimen sliding scale   SubCutaneous at bedtime  insulin lispro (ADMELOG) corrective regimen sliding scale   SubCutaneous three times a day before meals  insulin lispro Injectable (ADMELOG) 5 Unit(s) SubCutaneous three times a day before meals  lactobacillus acidophilus 1 Tablet(s) Oral three times a day with meals  lidocaine   4% Patch 1 Patch Transdermal daily  meropenem  IVPB 1000 milliGRAM(s) IV Intermittent every 8 hours  metoprolol tartrate 25 milliGRAM(s) Oral three times a day  predniSONE   Tablet 40 milliGRAM(s) Oral daily  rifAMPin 300 milliGRAM(s) Oral every 12 hours      PRN Meds:  bisacodyl Suppository 10 milliGRAM(s) Rectal daily PRN  dextrose Oral Gel 15 Gram(s) Oral once PRN  naproxen 500 milliGRAM(s) Oral two times a day PRN      LABS:                        7.6    7.87  )-----------( 200      ( 09 May 2023 23:30 )             25.6     Hgb Trend: 7.6<--  05-09    134<L>  |  96<L>  |  15  ----------------------------<  260<H>  3.6   |  27  |  0.57    Ca    9.6      09 May 2023 23:30  Phos  3.0     05-09  Mg     1.80     05-09    TPro  6.6  /  Alb  2.4<L>  /  TBili  0.8  /  DBili  x   /  AST  19  /  ALT  28  /  AlkPhos  146<H>  05-09    Creatinine Trend: 0.57<--  PT/INR - ( 09 May 2023 23:30 )   PT: 12.5 sec;   INR: 1.08 ratio         PTT - ( 09 May 2023 23:30 )  PTT:33.1 sec    Arterial Blood Gas:  05-10 @ 04:25  7.53/35/113/29/97.9/6.1  ABG lactate: --    Venous Blood Gas:  05-09 @ 23:30  7.38/49/63/29/89.4  VBG Lactate: 2.0             Interval Events:   -transfer from [place] overnight without any complications   -Bipap overnight-->placed on HFNC in AM       HPI:  62 year old female with a PMHx of IDL on 3.5L home O2, ALMA on CPAP and chronic prednisone, pulmonary HTN, T2DM, RA, psoriatic arthritis who initially present to [facility] for worsening dyspnea/ hypoxia requiring HFCA and BIPAP and was transferred to [facility] further management She has had multiple recent hospitalizations for acute on chronic hypoxic respiratory failure 2/2 IDL flair/ PNA. During her hospitalization she had a rapid response called (on 4/21) for left facial droop and AMS c/f possible stroke. Imagining at the time demonstrated high grade L sided carotid stenosis for which pt and family decided not to pursue surgical intervention.   Pt was then found to be febrile and tachycardic, and was transferred to ICU for sepsis with multifactorial encephalopathy and AHRF. Pts blood cultures grew MRSA and she was initially treated with vancomycin (4/21-4/25 and 5/3-5/8) but continued to have positive blood cultures x5. She was also started on Merrem (4/21-4/23) and subsequently started on Daptomycin on 4/25 with dose increase on 5/1. Plan was to continue the Daptomycin for a 6 week course and Rifampin (started on 5/1) for a 14 day course. Of note, pt had negative blood cx x2 on 5/4/23. Pt was found to be febrile again on 5/8 with a rectal temp of 100.7. Fungitell was ordered and pt was started on Meropenem. Possible suspected source was superficial thrombophlebitis (seen on U/S in the left cephalic vein). ID recommended NM whole body scan/ indium scan and ELICEO to r/o other source of persistent bacteremia, however pt was unable to lie flat given her respiratory status and these studies were not pursued (At the time of transfer, pt remained on dapto, abeba, and rifampine)  Pt hospital course was further complicated by hypercalcemia likely PTH-mediated ISO a L parathyroid adenoma. She was started on IVF and given Alendronate x1 with improvement. Palliative care was also consulted for multimodal pain management for diffuse arthralgias/myalgias possible 2/2 RA vs fibromyalgia     (09 May 2023 23:13)      REVIEW OF SYSTEMS:  Constitutional: [x ] negative [ ] fevers [ ] chills [ ] weight loss [ ] weight gain  HEENT: [x ] negative [ ] dry eyes [ ] eye irritation [ ] postnasal drip [ ] nasal congestion  CV: [x ] negative  [ ] chest pain [ ] orthopnea [ ] palpitations [ ] murmur  Resp: [ ] negative [ ] cough [x ] shortness of breath [ ] dyspnea [ ] wheezing [ ] sputum [ ] hemoptysis  GI: [x ] negative [ ] nausea [ ] vomiting [ ] diarrhea [ ] constipation [ ] abd pain [ ] dysphagia   : [x ] negative [ ] dysuria [ ] nocturia [ ] hematuria [ ] increased urinary frequency  Musculoskeletal: [ ] negative [ ] back pain [ ] myalgias [x ] arthralgias [ ] fracture  Skin: [x ] negative [ ] rash [ ] itch  Neurological: [x ] negative [ ] headache [ ] dizziness [ ] syncope [ ] weakness [ ] numbness  Psychiatric: [ ] negative [x ] anxiety [ ] depression  Endocrine: [x ] negative [ ] diabetes [ ] thyroid problem  Hematologic/Lymphatic: [x ] negative [ ] anemia [ ] bleeding problem  Allergic/Immunologic: [x ] negative [ ] itchy eyes [ ] nasal discharge [ ] hives [ ] angioedema  [x ] All other systems negative  [ ] Unable to assess ROS because ________    OBJECTIVE:  ICU Vital Signs Last 24 Hrs  T(C): 36.9 (10 May 2023 04:00), Max: 36.9 (10 May 2023 00:00)  T(F): 98.4 (10 May 2023 04:00), Max: 98.4 (10 May 2023 00:00)  HR: 104 (10 May 2023 06:00) (95 - 107)  BP: 109/85 (10 May 2023 06:00) (90/68 - 126/86)  BP(mean): 90 (10 May 2023 06:00) (73 - 96)  ABP: --  ABP(mean): --  RR: 26 (10 May 2023 06:00) (24 - 30)  SpO2: 97% (10 May 2023 06:00) (94% - 100%)    O2 Parameters below as of 10 May 2023 06:00  Patient On (Oxygen Delivery Method): nasal cannula, high flow  O2 Flow (L/min): 50  O2 Concentration (%): 80          CAPILLARY BLOOD GLUCOSE      POCT Blood Glucose.: 224 mg/dL (10 May 2023 01:06)      Physical Exam:   Constitutional: no acute distress   HEENT: + PERRLA, EOMI, no drainage or redness  Neck: supple,  No JVD  Respiratory: scattered Rhonchi B/L    Cardiovascular: Regular rate, regular rhythm, normal S1, S2; no murmurs or rub  Gastrointestinal: obese, soft, non-tender, non distended, no hepatosplenomegaly, normal bowel sounds  Extremities: + 2 peripheral edema, no cyanosis, no clubbing   Vascular: Equal and normal pulses: 2+ peripheral pulses throughout  Neurological: alert and oriented   Psychiatric: anxious at times   Musculoskeletal: No joint swelling or deformity; no limitation of movement  Skin: warm, dry, well perfused, no rashes, ecchymotic upper extremities     HOSPITAL MEDICATIONS:  Standing Meds:  amitriptyline 10 milliGRAM(s) Oral at bedtime  atovaquone  Suspension 1500 milliGRAM(s) Oral daily  chlorhexidine 2% Cloths 1 Application(s) Topical <User Schedule>  cholecalciferol 1000 Unit(s) Oral daily  DAPTOmycin IVPB      dextrose 5%. 1000 milliLiter(s) IV Continuous <Continuous>  dextrose 5%. 1000 milliLiter(s) IV Continuous <Continuous>  dextrose 50% Injectable 12.5 Gram(s) IV Push once  dextrose 50% Injectable 25 Gram(s) IV Push once  dextrose 50% Injectable 25 Gram(s) IV Push once  DULoxetine 30 milliGRAM(s) Oral daily  enoxaparin Injectable 40 milliGRAM(s) SubCutaneous every 24 hours  glucagon  Injectable 1 milliGRAM(s) IntraMuscular once  insulin glargine Injectable (LANTUS) 12 Unit(s) SubCutaneous at bedtime  insulin lispro (ADMELOG) corrective regimen sliding scale   SubCutaneous at bedtime  insulin lispro (ADMELOG) corrective regimen sliding scale   SubCutaneous three times a day before meals  insulin lispro Injectable (ADMELOG) 5 Unit(s) SubCutaneous three times a day before meals  lactobacillus acidophilus 1 Tablet(s) Oral three times a day with meals  lidocaine   4% Patch 1 Patch Transdermal daily  meropenem  IVPB 1000 milliGRAM(s) IV Intermittent every 8 hours  metoprolol tartrate 25 milliGRAM(s) Oral three times a day  predniSONE   Tablet 40 milliGRAM(s) Oral daily  rifAMPin 300 milliGRAM(s) Oral every 12 hours      PRN Meds:  bisacodyl Suppository 10 milliGRAM(s) Rectal daily PRN  dextrose Oral Gel 15 Gram(s) Oral once PRN  naproxen 500 milliGRAM(s) Oral two times a day PRN      LABS:                        7.6    7.87  )-----------( 200      ( 09 May 2023 23:30 )             25.6     Hgb Trend: 7.6<--  05-09    134<L>  |  96<L>  |  15  ----------------------------<  260<H>  3.6   |  27  |  0.57    Ca    9.6      09 May 2023 23:30  Phos  3.0     05-09  Mg     1.80     05-09    TPro  6.6  /  Alb  2.4<L>  /  TBili  0.8  /  DBili  x   /  AST  19  /  ALT  28  /  AlkPhos  146<H>  05-09    Creatinine Trend: 0.57<--  PT/INR - ( 09 May 2023 23:30 )   PT: 12.5 sec;   INR: 1.08 ratio         PTT - ( 09 May 2023 23:30 )  PTT:33.1 sec    Arterial Blood Gas:  05-10 @ 04:25  7.53/35/113/29/97.9/6.1  ABG lactate: --    Venous Blood Gas:  05-09 @ 23:30  7.38/49/63/29/89.4  VBG Lactate: 2.0

## 2023-05-10 NOTE — CONSULT NOTE ADULT - SUBJECTIVE AND OBJECTIVE BOX
Patient is a 62y old  Female who presents with a chief complaint of     HPI:  62F with ILD on 3.5L home O2/CPAP and chronic prednisone, pulmonary HTN, DM2, RA, psoriatic arthritis presented to [facility] ED for worsening dyspnea and hypoxia, admitted for ILD with exacerbation and acute on chronic hypoxic respiratory failure. She was treated with high-dose corticosteroids and started on HFNC, course complicated by AMS, found to have demonstrated high grade L-sided carotid stenosis, but family declined surgical intervention, course further complicated by worsening hypoxia requiring BIPAP and fever, transferred to MICU, found to have MRSA bacteremia (4/24), initially treated with Vancomycin, switched to Daptomycin (4/25) with subsequent increased dose on (5/1), Blood culture cleared on (5/4), suspected source is from superficial thrombophlebitis, seen on US (4/17: Superficial thrombus is seen in the left cephalic vein at the level of the forearm. No DVT), planned for 6 weeks course with Rifampin 300mg PO q12 for total of 2 week course, course again complicated by low grade fever 100.7F (5/8), added meropenem for empiric coverage. Eventually patient transferred to [facility], ID consulted for assistance.    Patient ---         PAST MEDICAL & SURGICAL HISTORY:  ILD (interstitial lung disease)      HTN (hypertension)      Type 2 diabetes mellitus      ALMA (obstructive sleep apnea)      H/O pulmonary hypertension          Allergies  penicillins (Other)    ANTIMICROBIALS (past 90 days)  MEDICATIONS  (STANDING):    DAPTOmycin IVPB   650 milliGRAM(s) IV Intermittent (05-10-23 @ 01:55)    meropenem  IVPB   100 mL/Hr IV Intermittent (05-10-23 @ 01:54)    rifAMPin   300 milliGRAM(s) Oral (05-10-23 @ 01:54)        atovaquone  Suspension 1500 daily  DAPTOmycin IVPB    meropenem  IVPB 1000 every 8 hours  rifAMPin 300 every 12 hours    MEDICATIONS  (STANDING):  albuterol/ipratropium for Nebulization 3 every 6 hours PRN  amitriptyline 10 at bedtime  bisacodyl Suppository 10 daily PRN  dextrose 50% Injectable 12.5 once  dextrose 50% Injectable 25 once  dextrose 50% Injectable 25 once  dextrose Oral Gel 15 once PRN  DULoxetine 30 daily  enoxaparin Injectable 40 every 24 hours  furosemide   Injectable 40 once  glucagon  Injectable 1 once  insulin glargine Injectable (LANTUS) 12 at bedtime  insulin lispro (ADMELOG) corrective regimen sliding scale  at bedtime  insulin lispro (ADMELOG) corrective regimen sliding scale  three times a day before meals  insulin lispro Injectable (ADMELOG) 5 three times a day before meals  methylPREDNISolone sodium succinate Injectable 40 every 8 hours  metoprolol tartrate 25 three times a day  naproxen 500 two times a day PRN    SOCIAL HISTORY:       FAMILY HISTORY:    REVIEW OF SYSTEMS  [  ] ROS unobtainable because:    [  ] All other systems negative except as noted below:	    Constitutional:  [ ] fever [ ] chills  [ ] weight loss  [ ] weakness  Skin:  [ ] rash [ ] phlebitis	  Eyes: [ ] icterus [ ] pain  [ ] discharge	  ENMT: [ ] sore throat  [ ] thrush [ ] ulcers [ ] exudates  Respiratory: [ ] dyspnea [ ] hemoptysis [ ] cough [ ] sputum	  Cardiovascular:  [ ] chest pain [ ] palpitations [ ] edema	  Gastrointestinal:  [ ] nausea [ ] vomiting [ ] diarrhea [ ] constipation [ ] pain	  Genitourinary:  [ ] dysuria [ ] frequency [ ] hematuria [ ] discharge [ ] flank pain  [ ] incontinence  Musculoskeletal:  [ ] myalgias [ ] arthralgias [ ] arthritis  [ ] back pain  Neurological:  [ ] headache [ ] seizures  [ ] confusion/altered mental status  Psychiatric:  [ ] anxiety [ ] depression	  Hematology/Lymphatics:  [ ] lymphadenopathy  Endocrine:  [ ] adrenal [ ] thyroid  Allergic/Immunologic:	 [ ] transplant [ ] seasonal    Vital Signs Last 24 Hrs  T(F): 98.4 (05-10-23 @ 04:00), Max: 98.4 (05-10-23 @ 00:00)  Vital Signs Last 24 Hrs  HR: 95 (05-10-23 @ 09:00) (88 - 107)  BP: 105/89 (05-10-23 @ 08:00) (90/68 - 126/86)  RR: 27 (05-10-23 @ 09:00)  SpO2: 86% (05-10-23 @ 09:00) (86% - 100%)  Wt(kg): --    Physical Exam:  Constitutional:  well preserved, comfortable  Head/Eyes: no icterus  ENT:  supple, no cervical lymphadenopathy   LUNGS:  CTA  CVS:  regular rhythm, no murmur  Abd:  soft, non-tender; non-distended  Ext:  no edema  Vascular:  IV site no erythema tenderness or discharge  MSK:  joints without swelling  Neuro: AAO X 3, non- focal                              7.6    7.87  )-----------( 200      ( 09 May 2023 23:30 )             25.6   05-09    134<L>  |  96<L>  |  15  ----------------------------<  260<H>  3.6   |  27  |  0.57    Ca    9.6      09 May 2023 23:30  Phos  3.0     05-09  Mg     1.80     05-09    TPro  6.6  /  Alb  2.4<L>  /  TBili  0.8  /  DBili  x   /  AST  19  /  ALT  28  /  AlkPhos  146<H>  05-09    MICROBIOLOGY:          Rapid RVP Result: NotDetec (05-10 @ 06:36)      RADIOLOGY:  imaging below personally reviewed and agree with findings  < from: Xray Chest 1 View- PORTABLE-Urgent (Xray Chest 1 View- PORTABLE-Urgent .) (05.09.23 @ 23:43) >  IMPRESSION: Follow-up with diffuse opacities consistent with interstitial   lung disease.    < end of copied text >   Patient is a 62y old  Female who presents with a chief complaint of     HPI:  62F with ILD on 3.5L home O2/CPAP and chronic prednisone, pulmonary HTN, DM2, RA, psoriatic arthritis presented to [facility] ED for worsening dyspnea and hypoxia, admitted for ILD with exacerbation and acute on chronic hypoxic respiratory failure. She was treated with high-dose corticosteroids and started on HFNC, course complicated by AMS, found to have demonstrated high grade L-sided carotid stenosis, but family declined surgical intervention, course further complicated by worsening hypoxia requiring BIPAP and fever, transferred to MICU, found to have MRSA bacteremia (4/24), initially treated with Vancomycin, switched to Daptomycin (4/25) with subsequent increased dose on (5/1), Blood culture cleared on (5/4), suspected source is from superficial thrombophlebitis, seen on US (4/17: Superficial thrombus is seen in the left cephalic vein at the level of the forearm. No DVT), planned for 6 weeks course with Rifampin 300mg PO q12 for total of 2 week course, course again complicated by low grade fever 100.7F (5/8), added meropenem for empiric coverage. Eventually patient transferred to [facility], ID consulted for assistance.    Patient reports dyspnea with mild nonproductive cough  Had fever during her stay at [facility] and prior to admission, but has since been afebrile without chills  Denies any cardiac device, hardware, or prosthetic joint replacement  Denies abdominal pain, n/v, diarrhea, dysuria, headache, back pain    No leukocytosis  RVP negative  HIV negative  CXR with diffuse opacity consistent with ILD  CT Chest (4/24) without evidence of pneumonia, stable ILD  US LUE (4/17): Superficial thrombus is seen in the left cephalic vein at the level of the forearm. No DVT  BCx (4/24, 4/25, 4/27, 4/28, 5/1, 5/2) positive for MRSA  BCx (5/4) NGTD        PAST MEDICAL & SURGICAL HISTORY:  ILD (interstitial lung disease)      HTN (hypertension)      Type 2 diabetes mellitus      ALMA (obstructive sleep apnea)      H/O pulmonary hypertension          Allergies  penicillins (Other)    ANTIMICROBIALS (past 90 days)  MEDICATIONS  (STANDING):    DAPTOmycin IVPB   650 milliGRAM(s) IV Intermittent (05-10-23 @ 01:55)    meropenem  IVPB   100 mL/Hr IV Intermittent (05-10-23 @ 01:54)    rifAMPin   300 milliGRAM(s) Oral (05-10-23 @ 01:54)        atovaquone  Suspension 1500 daily  DAPTOmycin IVPB    meropenem  IVPB 1000 every 8 hours  rifAMPin 300 every 12 hours    MEDICATIONS  (STANDING):  albuterol/ipratropium for Nebulization 3 every 6 hours PRN  amitriptyline 10 at bedtime  bisacodyl Suppository 10 daily PRN  dextrose 50% Injectable 12.5 once  dextrose 50% Injectable 25 once  dextrose 50% Injectable 25 once  dextrose Oral Gel 15 once PRN  DULoxetine 30 daily  enoxaparin Injectable 40 every 24 hours  furosemide   Injectable 40 once  glucagon  Injectable 1 once  insulin glargine Injectable (LANTUS) 12 at bedtime  insulin lispro (ADMELOG) corrective regimen sliding scale  at bedtime  insulin lispro (ADMELOG) corrective regimen sliding scale  three times a day before meals  insulin lispro Injectable (ADMELOG) 5 three times a day before meals  methylPREDNISolone sodium succinate Injectable 40 every 8 hours  metoprolol tartrate 25 three times a day  naproxen 500 two times a day PRN    SOCIAL HISTORY:   Denies alcohol, tobacco, recreational drug use  Lives with family, no recent travel      FAMILY HISTORY: HTN    REVIEW OF SYSTEMS  [  ] ROS unobtainable because:    [x  ] All other systems negative except as noted below:	    Constitutional:  [ ] fever [ ] chills  [ ] weight loss  [ ] weakness  Skin:  [ ] rash [ ] phlebitis	  Eyes: [ ] icterus [ ] pain  [ ] discharge	  ENMT: [ ] sore throat  [ ] thrush [ ] ulcers [ ] exudates  Respiratory: [ x] dyspnea [ ] hemoptysis [x] cough [ ] sputum	  Cardiovascular:  [ ] chest pain [ ] palpitations [ ] edema	  Gastrointestinal:  [ ] nausea [ ] vomiting [ ] diarrhea [ ] constipation [ ] pain	  Genitourinary:  [ ] dysuria [ ] frequency [ ] hematuria [ ] discharge [ ] flank pain  [ ] incontinence  Musculoskeletal:  [ ] myalgias [ ] arthralgias [ ] arthritis  [ ] back pain  Neurological:  [ ] headache [ ] seizures  [ ] confusion/altered mental status  Psychiatric:  [ ] anxiety [ ] depression	  Hematology/Lymphatics:  [ ] lymphadenopathy  Endocrine:  [ ] adrenal [ ] thyroid  Allergic/Immunologic:	 [ ] transplant [ ] seasonal    Vital Signs Last 24 Hrs  T(F): 98.4 (05-10-23 @ 04:00), Max: 98.4 (05-10-23 @ 00:00)  Vital Signs Last 24 Hrs  HR: 95 (05-10-23 @ 09:00) (88 - 107)  BP: 105/89 (05-10-23 @ 08:00) (90/68 - 126/86)  RR: 27 (05-10-23 @ 09:00)  SpO2: 86% (05-10-23 @ 09:00) (86% - 100%)  Wt(kg): --    Physical Exam:  Constitutional:  well preserved, comfortable  Head/Eyes: no icterus  ENT:  supple, no cervical lymphadenopathy   LUNGS:  diffuse crackles  CVS:  regular rhythm, no murmur  Abd:  soft, non-tender; non-distended  Ext:  multiple UE ecchymosis b/l  Vascular:  IV site no erythema tenderness or discharge  MSK:  joints without swelling  Neuro: AAO X 3, non- focal                              7.6    7.87  )-----------( 200      ( 09 May 2023 23:30 )             25.6   05-09    134<L>  |  96<L>  |  15  ----------------------------<  260<H>  3.6   |  27  |  0.57    Ca    9.6      09 May 2023 23:30  Phos  3.0     05-09  Mg     1.80     05-09    TPro  6.6  /  Alb  2.4<L>  /  TBili  0.8  /  DBili  x   /  AST  19  /  ALT  28  /  AlkPhos  146<H>  05-09    MICROBIOLOGY:          Rapid RVP Result: NotDetec (05-10 @ 06:36)      RADIOLOGY:  imaging below personally reviewed and agree with findings  < from: Xray Chest 1 View- PORTABLE-Urgent (Xray Chest 1 View- PORTABLE-Urgent .) (05.09.23 @ 23:43) >  IMPRESSION: Follow-up with diffuse opacities consistent with interstitial   lung disease.    < end of copied text >   Patient is a 62y old  Female who presents with a chief complaint of     HPI:  62F with ILD on 3.5L home O2/CPAP and chronic prednisone, pulmonary HTN, DM2, RA, psoriatic arthritis presented to [facility] ED for worsening dyspnea and hypoxia, admitted for ILD with exacerbation and acute on chronic hypoxic respiratory failure. She was treated with high-dose corticosteroids and started on HFNC, course complicated by AMS, found to have demonstrated high grade L-sided carotid stenosis, but family declined surgical intervention, course further complicated by worsening hypoxia requiring BIPAP and fever, transferred to MICU, found to have MRSA bacteremia (4/24), initially treated with Vancomycin, switched to Daptomycin (4/25) with subsequent increased dose on (5/1), Blood culture cleared on (5/4), suspected source was from superficial thrombophlebitis, seen on US (4/17: Superficial thrombus is seen in the left cephalic vein at the level of the forearm. No DVT), planned for 6 weeks course with Rifampin 300mg PO q12 for total of 2 week course, course again complicated by low grade fever 100.7F (5/8), added meropenem for empiric coverage. Eventually patient transferred to [facility], ID consulted for assistance.    Patient reports dyspnea with mild nonproductive cough  Had fever during her stay at [facility] and prior to admission, but has since been afebrile without chills  Denies any cardiac device, hardware, or prosthetic joint replacement  Denies abdominal pain, n/v, diarrhea, dysuria, headache, back pain    No leukocytosis  RVP negative  HIV negative  CXR with diffuse opacity consistent with ILD  CT Chest (4/24) without evidence of pneumonia, stable ILD  US LUE (4/17): Superficial thrombus is seen in the left cephalic vein at the level of the forearm. No DVT  BCx (4/24, 4/25, 4/27, 4/28, 5/1, 5/2) positive for MRSA  BCx (5/4) NGTD        PAST MEDICAL & SURGICAL HISTORY:  ILD (interstitial lung disease)      HTN (hypertension)      Type 2 diabetes mellitus      ALMA (obstructive sleep apnea)      H/O pulmonary hypertension          Allergies  penicillins (Other)    ANTIMICROBIALS (past 90 days)  MEDICATIONS  (STANDING):    DAPTOmycin IVPB   650 milliGRAM(s) IV Intermittent (05-10-23 @ 01:55)    meropenem  IVPB   100 mL/Hr IV Intermittent (05-10-23 @ 01:54)    rifAMPin   300 milliGRAM(s) Oral (05-10-23 @ 01:54)        atovaquone  Suspension 1500 daily  DAPTOmycin IVPB    meropenem  IVPB 1000 every 8 hours  rifAMPin 300 every 12 hours    MEDICATIONS  (STANDING):  albuterol/ipratropium for Nebulization 3 every 6 hours PRN  amitriptyline 10 at bedtime  bisacodyl Suppository 10 daily PRN  dextrose 50% Injectable 12.5 once  dextrose 50% Injectable 25 once  dextrose 50% Injectable 25 once  dextrose Oral Gel 15 once PRN  DULoxetine 30 daily  enoxaparin Injectable 40 every 24 hours  furosemide   Injectable 40 once  glucagon  Injectable 1 once  insulin glargine Injectable (LANTUS) 12 at bedtime  insulin lispro (ADMELOG) corrective regimen sliding scale  at bedtime  insulin lispro (ADMELOG) corrective regimen sliding scale  three times a day before meals  insulin lispro Injectable (ADMELOG) 5 three times a day before meals  methylPREDNISolone sodium succinate Injectable 40 every 8 hours  metoprolol tartrate 25 three times a day  naproxen 500 two times a day PRN    SOCIAL HISTORY:   Denies alcohol, tobacco, recreational drug use  Lives with family, no recent travel      FAMILY HISTORY: HTN    REVIEW OF SYSTEMS  [  ] ROS unobtainable because:    [x  ] All other systems negative except as noted below:	    Constitutional:  [ ] fever [ ] chills  [ ] weight loss  [ ] weakness  Skin:  [ ] rash [ ] phlebitis	  Eyes: [ ] icterus [ ] pain  [ ] discharge	  ENMT: [ ] sore throat  [ ] thrush [ ] ulcers [ ] exudates  Respiratory: [ x] dyspnea [ ] hemoptysis [x] cough [ ] sputum	  Cardiovascular:  [ ] chest pain [ ] palpitations [ ] edema	  Gastrointestinal:  [ ] nausea [ ] vomiting [ ] diarrhea [ ] constipation [ ] pain	  Genitourinary:  [ ] dysuria [ ] frequency [ ] hematuria [ ] discharge [ ] flank pain  [ ] incontinence  Musculoskeletal:  [ ] myalgias [ ] arthralgias [ ] arthritis  [ ] back pain  Neurological:  [ ] headache [ ] seizures  [ ] confusion/altered mental status  Psychiatric:  [ ] anxiety [ ] depression	  Hematology/Lymphatics:  [ ] lymphadenopathy  Endocrine:  [ ] adrenal [ ] thyroid  Allergic/Immunologic:	 [ ] transplant [ ] seasonal    Vital Signs Last 24 Hrs  T(F): 98.4 (05-10-23 @ 04:00), Max: 98.4 (05-10-23 @ 00:00)  Vital Signs Last 24 Hrs  HR: 95 (05-10-23 @ 09:00) (88 - 107)  BP: 105/89 (05-10-23 @ 08:00) (90/68 - 126/86)  RR: 27 (05-10-23 @ 09:00)  SpO2: 86% (05-10-23 @ 09:00) (86% - 100%)  Wt(kg): --    Physical Exam:  Constitutional:  ill appearing, dyspneic, on HF  Head/Eyes: no icterus  ENT:  supple, no cervical lymphadenopathy   LUNGS:  diffuse crackles  CVS:  regular rhythm, no murmur  Abd:  soft, non-tender; non-distended  Ext:  multiple UE ecchymosis b/l  Vascular:  IV site no erythema tenderness or discharge  MSK:  joints without swelling  Neuro: AAO X 3, non- focal                              7.6    7.87  )-----------( 200      ( 09 May 2023 23:30 )             25.6   05-09    134<L>  |  96<L>  |  15  ----------------------------<  260<H>  3.6   |  27  |  0.57    Ca    9.6      09 May 2023 23:30  Phos  3.0     05-09  Mg     1.80     05-09    TPro  6.6  /  Alb  2.4<L>  /  TBili  0.8  /  DBili  x   /  AST  19  /  ALT  28  /  AlkPhos  146<H>  05-09    MICROBIOLOGY:          Rapid RVP Result: NotDetec (05-10 @ 06:36)      RADIOLOGY:  imaging below personally reviewed and agree with findings  < from: Xray Chest 1 View- PORTABLE-Urgent (Xray Chest 1 View- PORTABLE-Urgent .) (05.09.23 @ 23:43) >  IMPRESSION: Follow-up with diffuse opacities consistent with interstitial   lung disease.    < end of copied text >

## 2023-05-10 NOTE — CHART NOTE - NSCHARTNOTEFT_GEN_A_CORE
:    INDICATION:    PROCEDURE:  [x ] LIMITED ECHO  [ ] LIMITED CHEST  [ ] LIMITED RETROPERITONEAL  [ ] LIMITED ABDOMINAL  [ ] LIMITED DVT  [ ] NEEDLE GUIDANCE VASCULAR  [ ] NEEDLE GUIDANCE THORACENTESIS  [ ] NEEDLE GUIDANCE PARACENTESIS  [ ] NEEDLE GUIDANCE PERICARDIOCENTESIS  [ ] OTHER    FINDINGS: TTE, Mildly reduced LV systolic function. Dilated RV with thicken RV wall with septal flattening during systole. No Mitral or aortic regurgitation. Mild to moderate Tricuspid regurgitation on color doppler. Unable to capture a clear envelope on Tricuspid valve on RV inflow view or on Parasternal Short axis. Pulmonary acceleration time of 27 msecs, consistent with severe pulmonary HTN. VTI 14.3 on apical 5 chamber view. IVC 2.12 cm in diameter. LVOT Diameter of 1.62cm.       INTERPRETATION: Decreased LV and RV function with signs of elevated pulmonary pressures consistent with the patient's history of Pulmonary HTN due to ILD, emphysema and CHF. Currently on inhaled NO.         Images uploaded to [employer] : Perla Osborne    INDICATION: Hypoxemic Respiratory Failure and Pulmonary Hypertension  - Performed on Angie 10 ppb    PROCEDURE:  [x ] LIMITED ECHO  [ ] LIMITED CHEST  [ ] LIMITED RETROPERITONEAL  [ ] LIMITED ABDOMINAL  [ ] LIMITED DVT  [ ] NEEDLE GUIDANCE VASCULAR  [ ] NEEDLE GUIDANCE THORACENTESIS  [ ] NEEDLE GUIDANCE PARACENTESIS  [ ] NEEDLE GUIDANCE PERICARDIOCENTESIS  [ ] OTHER    FINDINGS: TTE, Mildly reduced LV systolic function. Dilated RV with thicken RV wall with septal flattening during systole. No Mitral or aortic regurgitation. Mild to moderate Tricuspid regurgitation on color doppler. Unable to capture a clear envelope on Tricuspid valve on RV inflow view or on Parasternal Short axis. Pulmonary acceleration time of 27 msecs, consistent with severe pulmonary HTN. VTI 14.3 on apical 5 chamber view. IVC 2.12 cm in diameter. LVOT Diameter of 1.62cm.       INTERPRETATION: Decreased LV and RV function with signs of elevated pulmonary pressures consistent with the patient's history of Pulmonary HTN due to ILD, emphysema and CHF. Currently on inhaled NO.       Images uploaded to [employer]    Attestation:  I was present for the entire procedure and have reviewed all images : Perla Osborne    INDICATION: Hypoxemic Respiratory Failure and Pulmonary Hypertension  - Performed on Angie 10 ppm    PROCEDURE:  [x ] LIMITED ECHO  [ ] LIMITED CHEST  [ ] LIMITED RETROPERITONEAL  [ ] LIMITED ABDOMINAL  [ ] LIMITED DVT  [ ] NEEDLE GUIDANCE VASCULAR  [ ] NEEDLE GUIDANCE THORACENTESIS  [ ] NEEDLE GUIDANCE PARACENTESIS  [ ] NEEDLE GUIDANCE PERICARDIOCENTESIS  [ ] OTHER    FINDINGS: TTE, Mildly reduced LV systolic function. Dilated RV with thicken RV wall with septal flattening during systole. No Mitral or aortic regurgitation. Mild to moderate Tricuspid regurgitation on color doppler. Unable to capture a clear envelope on Tricuspid valve on RV inflow view or on Parasternal Short axis. Pulmonary acceleration time of 27 msecs, consistent with severe pulmonary HTN. VTI 14.3 on apical 5 chamber view. IVC 2.12 cm in diameter. LVOT Diameter of 1.62cm.       INTERPRETATION: Decreased LV and RV function with signs of elevated pulmonary pressures consistent with the patient's history of Pulmonary HTN due to ILD, emphysema and CHF. Currently on inhaled NO.       Images uploaded to [employer]    Attestation:  I was present for the entire procedure and have reviewed all images

## 2023-05-10 NOTE — PROGRESS NOTE ADULT - ASSESSMENT
This is a 63 y/o F with PMH of DM2, HTN, HLD, obesity, non-obstructive CAD, ALMA on CPAP, and ILD thought secondary to prior Methotrexate use, RA and Psoriatic arthritis who was [place] [facility] [place]9 for acute hypoxemic respiratory failure in the setting of recent MRSA bacteremia and suspected ILD exacerbation    Neuro  Alerted and oriented x4 at baseline, hx of pain RRT on 4/21 at [facility] for left facial droop, found to have left sided carotid stenosis only   -currently alert and oriented   - Palliative care was consulted at OSH for multimodal pain management for diffuse arthralgias/myalgias possible 2/2 RA vs fibromyalgia    - c/w Cymbalta, amitriptyline, lidocaine patch, naproxen, tylenol   -PT consult placed   -encourage OOB to chair if tolerated     Pulmonary    Hx of past smoker, ALMA on CPAP, ILD 2/2 ?methotrexate use, pulmonary HTN (remotely on sildenafil stopped ~1 month ago)  who presented to [facility] on 4/10 for acute hypoxemic respiratory failure was on 40L/70% at [facility] alternating with BIPAP 12/5 70%   - Continue HFNC 60L/100% and taper as tolerated with nocturnal Bipap more tachypneic this morning   -scattered B-lines on POCUS will give lasix 40mg x1   - Bronchodilators prn   - Continue steroids for ILD currently on solumedrol 40mg Q8h   -f/u fungitell   -pending call back from Dr. Kim at [facility] who has reportedly been caring for patient  - If no clear explanation for hypoxemia will need to consider CTPA as patient has been hospitalized on and off since early April    Cardiac   Hx of HTN and nonobstructive CAD, reported diastolic dysfunction, and pulmonary hypertension (last left/right sided cath at [facility] 12/2022)  - will restart statin   - c/w metoprolol tartrate  - holding nifedipine given normotension   - pending official ECHO   -will check pro-BNP and diurese today and as needed       GI  No acute issues  -will keep NPO given increased tachypnea today and alternating bipap   - Was reportedly on PPI at home will continue while on steroids   - will start Bowel regimen     /Renal  No SHAAN   -replete electrolytes if needed   -currently voiding  -will diurese today     Endocrine   DM2 A1c 7.1, hyperglycemia on steroids, reported to have hypercalcemia 2/2 left parathyroid adenoma s/p Alendronate x1   - Continue Lantus 12units/5units premeal and SS   -prednisone 40mg daily changed to solumedrol 40mg Q8h for now   - s/p Alendronate x1 with resolved hypercalcemia will continue to monitor      Rheumatology   Hx of RA     Infectious Disease   MRSA bacteremia at [facility]. Cultures grew MRSA and was initially treated with vancomycin (4/21-4/25 and 5/3-5/8) but continued to have positive blood cultures x5 and subsequently started on Daptomycin on 4/25 with dose increase on 5/1. As per OSH records, the plan was to continue the Daptomycin for a 6 week course and Rifampin (started on 5/1) for a 14 day course.  -will consult ID   - continue current antibiotics: Daptomycin, Rifampin, and Meropenem  - Will need to obtain culture results from [facility]  -f/u repeat blood cultures here   - Check RVP negative  - f/u Fungitell  -c/w PCP prophylaxis with Mepron      Heme/DVT PPX  Anemia   -H/H stable   -c/w lovenox SQ  -will f/u iron studies       GOC:   -Full Code - MOLST from [facility] confirmed with patient  -HCP filled out with daughter Joyce Alicea as primary and secondary is her sister Emi Garciard      This is a 63 y/o F with PMH of DM2, HTN, HLD, obesity, non-obstructive CAD, ALMA on CPAP, and ILD thought secondary to prior Methotrexate use, RA and Psoriatic arthritis who [place] [facility] [place] acute hypoxemic respiratory failure in the setting of recent MRSA bacteremia and suspected ILD exacerbation    Neuro  Alerted and oriented x4 at baseline, hx of pain RRT [place]21 [place] [facility] [place] dro[place], found to have left sided carotid stenosis only   -currently alert and oriented   - Palliative care was consulted at OSH for multimodal pain management for diffuse arthralgias/myalgias possible 2/2 RA vs fibromyalgia    - c/w Cymbalta, amitriptyline, lidocaine patch, naproxen, tylenol   -PT consult placed   -encourage OOB to chair if tolerated     Pulmonary    Hx of past smoker, ALMA on CPAP, ILD 2/2 ?methotrexate use, pulmonary HTN (remotely on sildenafil stopped ~1 month ago)  who [place] [facility] [place] for acute hypoxemic respiratory failure was on 40L/70% [place] [facility] [place] with BIPAP 12/5 70%   - Continue HFNC 60L/100% and taper as tolerated with nocturnal Bipap more tachypneic this morning   -scattered B-lines on POCUS will give lasix 40mg x1   - Bronchodilators prn   - Continue steroids for ILD currently on solumedrol 40mg Q8h   -f/u fungitell   -pending call back from Dr. Kim at [facility] who has reportedly been caring for patient  - If no clear explanation for hypoxemia will obtain CTPA as patient has been hospitalized on and off since early April    Cardiac   Hx of HTN and nonobstructive CAD, reported diastolic dysfunction, and pulmonary hypertension (last left/right sided cath at [facility] 12/2022)  - will restart statin   - c/w metoprolol tartrate  - holding nifedipine given normotension   - will f/u official ECHO results from today   - pro-BNP 05762 and diurese today and as needed       GI  No acute issues  -will keep NPO given increased tachypnea today and alternating bipap   - Was reportedly on PPI at home will continue while on steroids   - will start Bowel regimen     /Renal  No SHAAN   -replete electrolytes if needed   -currently voiding  -will diurese today     Endocrine   DM2 A1c 7.1, hyperglycemia on steroids, reported to have hypercalcemia 2/2 left parathyroid adenoma s/p Alendronate x1   - Continue Lantus 12units/5units premeal and SS will increase as needed   -prednisone 40mg daily changed to solumedrol 40mg Q8h for now   - s/p Alendronate x1 with resolved hypercalcemia will continue to monitor      Rheumatology   Hx of RA/psoriatic arthritis diagnosed ~2016, now seronegative, follows with Dr. Rush outpatient, failed multiple modalities (methotrexate, humira, xeljanz and rituximab due to insurance issues) recently on leflunomide.   -OSH records showed patient was negative  Kassi-1, SS-B, SS-A, RNP, SIRISHA, and RF   -will repeat RF, SIRISHA, Double stranded DNA, neutrophil cytoplasmic antibody, myomarker panel     Infectious Disease   MRSA bacteremia at [facility]. Cultures grew MRSA and was initially treated with vancomycin (4/21-4/25 and 5/3-5/8) but continued to have positive blood cultures x5 and subsequently started on Daptomycin on 4/25 with dose increase on 5/1. As per OSH records, the plan was to continue the Daptomycin for a 6 week course and Rifampin (started on 5/1) for a 14 day course.  -will consult ID   - continue current antibiotics: Daptomycin, Rifampin, and Meropenem  - Will need to obtain culture results from [facility]  -f/u repeat blood cultures here   - Check RVP negative  - f/u Fungitell  -c/w PCP prophylaxis with Mepron      Heme/DVT PPX  Anemia   -H/H stable   -c/w lovenox SQ  -will f/u iron studies       GOC:   -Full Code - MOLST from [facility] confirmed with patient  -HCP filled out with daughter Joyce Alicea as primary and secondary is her sister Emi Sánchez      This is a 61 y/o F with PMH of DM2, HTN, HLD, obesity, non-obstructive CAD, ALMA on CPAP, and ILD thought secondary to prior Methotrexate use, RA and Psoriatic arthritis who was transferred from [facility] on 5/9 for acute hypoxemic respiratory failure in the setting of recent MRSA bacteremia and suspected ILD exacerbation    Neuro  Alerted and oriented x4 at baseline, hx of pain RRT on 4/21 at [facility] for left facial droop, found to have left sided carotid stenosis only   -currently alert and oriented   - Palliative care was consulted at OSH for multimodal pain management for diffuse arthralgias/myalgias possible 2/2 RA vs fibromyalgia    - c/w Cymbalta, amitriptyline, lidocaine patch, naproxen, tylenol   -PT consult placed   -encourage OOB to chair if tolerated     Pulmonary    Hx of past smoker, ALMA on CPAP, ILD 2/2 ?methotrexate use, pulmonary HTN (remotely on sildenafil stopped ~1 month ago)  who presented to [facility] on 4/10 for acute hypoxemic respiratory failure was on 40L/70% at [facility] alternating with BIPAP 12/5 70%   - Continue HFNC 60L/100% and taper as tolerated with nocturnal Bipap more tachypneic this morning   -scattered B-lines on POCUS will give lasix 40mg x1   - Bronchodilators prn   - Continue steroids for ILD currently on solumedrol 40mg Q8h   -f/u fungitell   -pending call back from Dr. Kim at [facility] who has reportedly been caring for patient  - If no clear explanation for hypoxemia will obtain CTPA as patient has been hospitalized on and off since early April    Cardiac   Hx of HTN and nonobstructive CAD, reported diastolic dysfunction, and pulmonary hypertension (last left/right sided cath at [facility] 12/2022)  - will restart statin   - c/w metoprolol tartrate  - holding nifedipine given normotension   - will f/u official ECHO results from today   - pro-BNP 63830 and diurese today and as needed       GI  No acute issues  -will keep NPO given increased tachypnea today and alternating bipap   - Was reportedly on PPI at home will continue while on steroids   - will start Bowel regimen     /Renal  No SHAAN   -replete electrolytes if needed   -currently voiding  -will diurese today     Endocrine   DM2 A1c 7.1, hyperglycemia on steroids, reported to have hypercalcemia 2/2 left parathyroid adenoma s/p Alendronate x1   - Continue Lantus 12units/5units premeal and SS will increase as needed   -prednisone 40mg daily changed to solumedrol 40mg Q8h for now   - s/p Alendronate x1 with resolved hypercalcemia will continue to monitor    - Thyroid nodule noted on imaging - will need outpatient follow-up    Rheumatology   Hx of RA/psoriatic arthritis diagnosed ~2016, now seronegative, follows with Dr. Rush outpatient, failed multiple modalities (methotrexate, humira, xeljanz and rituximab due to insurance issues) recently on leflunomide.   -OSH records showed patient was negative  Kassi-1, SS-B, SS-A, RNP, SIRISHA, and RF   -will repeat RF, SIRISHA, Double stranded DNA, neutrophil cytoplasmic antibody, myomarker panel     Infectious Disease   MRSA bacteremia at [facility]. Cultures grew MRSA and was initially treated with vancomycin (4/21-4/25 and 5/3-5/8) but continued to have positive blood cultures x5 and subsequently started on Daptomycin on 4/25 with dose increase on 5/1. As per OSH records, the plan was to continue the Daptomycin for a 6 week course and Rifampin (started on 5/1) for a 14 day course.  -will consult ID   - continue current antibiotics: Daptomycin, Rifampin, and Meropenem  - Will need to obtain culture results from [facility]  -f/u repeat blood cultures here   - Check RVP negative  - f/u Fungitell  -c/w PCP prophylaxis with Mepron      Heme/DVT PPX  Anemia   -H/H stable   -c/w lovenox SQ  -will f/u iron studies       GOC:   -Full Code - MOLST from [facility] confirmed with patient  -HCP filled out with daughter Joyce Alicea as primary and secondary is her sister Emi Sánchez

## 2023-05-10 NOTE — PHYSICAL THERAPY INITIAL EVALUATION ADULT - GROSSLY INTACT, SENSORY
Assessed bilaterally upper extremity, grossly intact. Assessed bilateral lower extremity, light touch sensation decreased at bilateral feet

## 2023-05-10 NOTE — CONSULT NOTE ADULT - ATTENDING COMMENTS
61 yo woman ILD on home O2 and prednisone, psoriatic arthritis, DM transferred from [place] where she had high grade MRSA bacteremia 4/24 - 5/2, cleared 5/4 thought due to thrombophlebitis  on exam dyspneic denies back pain, joint pain.  presently on HFNC.   TTE no vegetation noted.   CXR diffuse infiltrates c/w ILD  Suggestions above

## 2023-05-10 NOTE — ADVANCED PRACTICE NURSE CONSULT - ASSESSMENT
General: A&Ox4, able to turn with 2x assistance. Patient is currently on high flow nasal cannula, no erythema noted. As per patient and primary RN, patient is not able to tolerate a full turn for assessment due to shortness of breath. Incontinent of urine and stool. As per primary RN, patient has been having liquid stools. Skin warm, dry with increased moisture in intertriginous folds, scattered areas of hyperpigmentation and hypopigmentation, scattered areas of ecchymosis without hematoma on bilateral upper extremities and abdomen. Risk for moisture associated dermatitis to b/l breasts and abdominal pannus.     Sacrum and buttocks: Patient unable to tolerate a full turn for assessment. Able to visualize sacrum and buttocks while patient turned as much as tolerated. Moisture associated dermatitis complicated by incontinence as evidenced by blanchable erythema, denudations and linear fissure within sacral/gluteal cleft and buttocks. Maceration noted to periwound of linear fissure. Impaired skin with irregular borders, exposing pink-moist dermis. Does not overly blaine prominences.

## 2023-05-10 NOTE — PHYSICAL THERAPY INITIAL EVALUATION ADULT - PERTINENT HX OF CURRENT PROBLEM, REHAB EVAL
Pt initially presented to [facility] for worsening dyspnea/ hypoxia and was admitted for intestitial lung disease with exacerbation and acute on chronic hypoxic respiratory failure. Pt was then transferred to [facility] for further management. Pt was transferred to ICU for sepsis with multifactorial encephalopathy and acute hypercapnic respiratory failure.

## 2023-05-10 NOTE — PROGRESS NOTE ADULT - ATTENDING COMMENTS
Patient seen and examined. Patient critically ill requiring frequent bedside visits with therapy change. Patient is a 62F with extensive history including DM2, HTN, HLD, obesity, non-obstructive CAD, ALMA on CPAP, and ILD (NSIP) thought secondary to prior Methotrexate use, RA and Psoriatic arthritis who is [place] [facility] [place] acute hypoxemic respiratory failure in the setting of recent MRSA bacteremia and suspected ILD exacerbation.    Patient with persistent hypoxemic respiratory failure and desaturation with minimal exertion. Additional information obtained from [facility]. Patient was taken off Sildenafil because thought was primarily related to Group II. Patient was on Torsemide. She also has CT scan with notable mosaic attenuation on the L lung.       #Pulmonary - acute hypoxemic respiratory failure  - CT images from [facility] - last CT reported from 4/24/23 - reviewed with evidence of ILD and mosaic attenuation on the L  - Continue HFNC 60L/100% and taper - was on 40L/70% at [facility] alternating with BIPAP 12/5 70% - patient more comfortable on HFNC  - Check ABG to determine A-a gradient and monitor PaO2 - will place A-line for hemodynamic monitoring and frequent ABG given recurrent desaturation  - Maintain O2 sat > 90% as able  - Bronchodilators  - Continue steroids for ILD with PCP prophylaxis. Check fungitell as it is unclear whether patient was on PCP prophylaxis at [facility] or [facility]. LDH elevated.  - Pulmonary hypertension - repeat echocardiogram today - has been off Sildenafil > 1 month. Will plan to start Angie today and gradually increase while monitoring BP to see if improement in respiratory status  - Patient has been seen by Dr. Keshia Kim at [facility] a few times since 1/2023.  - May ultimately need RHC. Will continue diuresis with goal net negative. May need muñiz for closer monitoring of I/O  - If no clear explanation for hypoxemia will need to consider CTPA as patient has been hospitalized on and off since early April - however at this point unable to lay flat for CTPA. Therefore will start therapeutic AC    #Infectious Disease - patient with reported MRSA bacteremia  - ID evaluation. Unclear if Rifampin still needed. Check CK while on Dapto.  - Culture results from [facility] reviewed - had persistent bacteremia which cleared prior to transfer.  - Repeat cultures here. No indwelling lines or muñiz on transfer  - RVP negative  - Check Fungitell  - Start PCP prophylaxis with Mepron. If fungitell increased may need to empirically start PCP treatment    #Cardiac - nonobstructive CAD, reported diastolic dysfunction, and pulmonary hypertension  - echocardiogram pending  - aggressive diuresis with goal net negative    #Endocrine  - Continue Lantus as dosed from [facility]  - Check FS and A1c  - Diabetic diet    #Gastro - bedside dysphagia screen and if passes can start diet with aspiration precautions  - PPI as was on this at home  - Bowel regimen    #DVT proph - will start therapeutic AC    Patient is at high risk for clinical deterioration. Patient is critically ill.

## 2023-05-11 NOTE — PROCEDURE NOTE - NSPROCDETAILS_GEN_ALL_CORE
location identified, draped/prepped, sterile technique used, needle inserted/introduced/positive blood return obtained via catheter/connected to a pressurized flush line/sutured in place/Seldinger technique/all materials/supplies accounted for at end of procedure
location identified, draped/prepped, sterile technique used, needle inserted/introduced/positive blood return obtained via catheter/connected to a pressurized flush line/sutured in place/hemostasis with direct pressure, dressing applied/Seldinger technique/all materials/supplies accounted for at end of procedure

## 2023-05-11 NOTE — PROCEDURE NOTE - NSINDICATIONS_GEN_A_CORE
arterial puncture to obtain ABG's/critical patient/monitoring purposes
arterial puncture to obtain ABG's/critical patient/monitoring purposes

## 2023-05-11 NOTE — PROGRESS NOTE ADULT - SUBJECTIVE AND OBJECTIVE BOX
Interval Events:   -tolerated increase in NO to 20PPM  -drop in Hgb s/p 1u PRBC  -lasix 40mg IVP @0600    HPI:  62 year old female with a PMHx of IDL on 3.5L home O2, ALMA on CPAP and chronic prednisone, pulmonary HTN, T2DM, RA, psoriatic arthritis who initially present to [facility] for worsening dyspnea/ hypoxia requiring HFCA and BIPAP and was transferred to [facility] further management She has had multiple recent hospitalizations for acute on chronic hypoxic respiratory failure 2/2 IDL flair/ PNA. During her hospitalization she had a rapid response called (on 4/21) for left facial droop and AMS c/f possible stroke. Imagining at the time demonstrated high grade L sided carotid stenosis for which pt and family decided not to pursue surgical intervention.   Pt was then found to be febrile and tachycardic, and was transferred to ICU for sepsis with multifactorial encephalopathy and AHRF. Pts blood cultures grew MRSA and she was initially treated with vancomycin (4/21-4/25 and 5/3-5/8) but continued to have positive blood cultures x5. She was also started on Merrem (4/21-4/23) and subsequently started on Daptomycin on 4/25 with dose increase on 5/1. Plan was to continue the Daptomycin for a 6 week course and Rifampin (started on 5/1) for a 14 day course. Of note, pt had negative blood cx x2 on 5/4/23. Pt was found to be febrile again on 5/8 with a rectal temp of 100.7. Fungitell was ordered and pt was started on Meropenem. Possible suspected source was superficial thrombophlebitis (seen on U/S in the left cephalic vein). ID recommended NM whole body scan/ indium scan and ELICEO to r/o other source of persistent bacteremia, however pt was unable to lie flat given her respiratory status and these studies were not pursued (At the time of transfer, pt remained on dapto, abeba, and rifampine)  Pt hospital course was further complicated by hypercalcemia likely PTH-mediated ISO a L parathyroid adenoma. She was started on IVF and given Alendronate x1 with improvement. Palliative care was also consulted for multimodal pain management for diffuse arthralgias/myalgias possible 2/2 RA vs fibromyalgia     (09 May 2023 23:13)      REVIEW OF SYSTEMS:  Constitutional: [x ] negative [ ] fevers [ ] chills [ ] weight loss [ ] weight gain  HEENT: [x ] negative [ ] dry eyes [ ] eye irritation [ ] postnasal drip [ ] nasal congestion  CV: [x ] negative  [ ] chest pain [ ] orthopnea [ ] palpitations [ ] murmur  Resp: [ ] negative [ ] cough [x ] shortness of breath [ ] dyspnea [ ] wheezing [ ] sputum [ ] hemoptysis  GI: [x ] negative [ ] nausea [ ] vomiting [ ] diarrhea [ ] constipation [ ] abd pain [ ] dysphagia   : [x ] negative [ ] dysuria [ ] nocturia [ ] hematuria [ ] increased urinary frequency  Musculoskeletal: [ ] negative [x ] back pain [ ] myalgias [ ] arthralgias [ ] fracture  Skin: [x ] negative [ ] rash [ ] itch  Neurological: [x ] negative [ ] headache [ ] dizziness [ ] syncope [ ] weakness [ ] numbness  Psychiatric: [ ] negative [x ] anxiety [ ] depression  Endocrine: [x ] negative [ ] diabetes [ ] thyroid problem  Hematologic/Lymphatic: [x ] negative [ ] anemia [ ] bleeding problem  Allergic/Immunologic: [x ] negative [ ] itchy eyes [ ] nasal discharge [ ] hives [ ] angioedema  [x ] All other systems negative  [ ] Unable to assess ROS because ________    OBJECTIVE:  ICU Vital Signs Last 24 Hrs  T(C): 36.4 (11 May 2023 04:00), Max: 37.2 (11 May 2023 00:00)  T(F): 97.5 (11 May 2023 04:00), Max: 99 (11 May 2023 00:00)  HR: 95 (11 May 2023 07:06) (85 - 107)  BP: 98/79 (10 May 2023 15:00) (92/63 - 116/77)  BP(mean): 84 (10 May 2023 15:00) (60 - 92)  ABP: 122/66 (11 May 2023 07:06) (100/62 - 130/82)  ABP(mean): 87 (11 May 2023 07:06) (78 - 101)  RR: 19 (11 May 2023 07:06) (16 - 37)  SpO2: 100% (11 May 2023 07:06) (86% - 100%)    O2 Parameters below as of 11 May 2023 07:06  Patient On (Oxygen Delivery Method): nasal cannula, high flow  O2 Flow (L/min): 60  O2 Concentration (%): 70          05-10 @ 07:01  -  05-11 @ 07:00  --------------------------------------------------------  IN: 825 mL / OUT: 2070 mL / NET: -1245 mL      CAPILLARY BLOOD GLUCOSE      POCT Blood Glucose.: 182 mg/dL (10 May 2023 21:10)    Physical Exam:   Constitutional: no acute distress   HEENT: + PERRLA, EOMI, no drainage or redness  Neck: supple,  No JVD  Respiratory: scattered Rhonchi B/L    Cardiovascular: Regular rate, regular rhythm, normal S1, S2; no murmurs or rub  Gastrointestinal: obese, soft, non-tender, non distended, no hepatosplenomegaly, normal bowel sounds  Extremities: + 2 peripheral edema, no cyanosis, no clubbing   Vascular: Equal and normal pulses: 2+ peripheral pulses throughout  Neurological: alert and oriented   Psychiatric: anxious at times   Musculoskeletal: No joint swelling or deformity; no limitation of movement  Skin: warm, dry, well perfused, no rashes, ecchymotic upper extremities       HOSPITAL MEDICATIONS:  Standing Meds:  amitriptyline 10 milliGRAM(s) Oral at bedtime  atorvastatin 20 milliGRAM(s) Oral at bedtime  atovaquone  Suspension 1500 milliGRAM(s) Oral daily  chlorhexidine 2% Cloths 1 Application(s) Topical <User Schedule>  cholecalciferol 1000 Unit(s) Oral daily  DAPTOmycin IVPB      DAPTOmycin IVPB 650 milliGRAM(s) IV Intermittent every 24 hours  dextrose 5%. 1000 milliLiter(s) IV Continuous <Continuous>  dextrose 5%. 1000 milliLiter(s) IV Continuous <Continuous>  dextrose 50% Injectable 25 Gram(s) IV Push once  dextrose 50% Injectable 25 Gram(s) IV Push once  dextrose 50% Injectable 12.5 Gram(s) IV Push once  DULoxetine 30 milliGRAM(s) Oral daily  enoxaparin Injectable 90 milliGRAM(s) SubCutaneous every 12 hours  glucagon  Injectable 1 milliGRAM(s) IntraMuscular once  insulin glargine Injectable (LANTUS) 12 Unit(s) SubCutaneous at bedtime  insulin lispro (ADMELOG) corrective regimen sliding scale   SubCutaneous three times a day before meals  insulin lispro (ADMELOG) corrective regimen sliding scale   SubCutaneous at bedtime  insulin lispro Injectable (ADMELOG) 5 Unit(s) SubCutaneous three times a day before meals  lactobacillus acidophilus 1 Tablet(s) Oral three times a day with meals  lidocaine   4% Patch 1 Patch Transdermal daily  magnesium sulfate  IVPB 2 Gram(s) IV Intermittent once  meropenem  IVPB 1000 milliGRAM(s) IV Intermittent every 8 hours  methylPREDNISolone sodium succinate Injectable 40 milliGRAM(s) IV Push every 8 hours  metoprolol tartrate 25 milliGRAM(s) Oral three times a day  mupirocin 2% Ointment 1 Application(s) Topical two times a day      PRN Meds:  albuterol/ipratropium for Nebulization 3 milliLiter(s) Nebulizer every 6 hours PRN  bisacodyl Suppository 10 milliGRAM(s) Rectal daily PRN  dextrose Oral Gel 15 Gram(s) Oral once PRN  naproxen 500 milliGRAM(s) Oral two times a day PRN      LABS:                        6.7    7.73  )-----------( 185      ( 11 May 2023 02:45 )             22.9     Hgb Trend: 6.7<--, 6.9<--, 7.6<--  05-11    137  |  99  |  17  ----------------------------<  187<H>  3.9   |  25  |  0.69    Ca    9.2      11 May 2023 02:15  Phos  3.7     05-11  Mg     1.70     05-11    TPro  6.2  /  Alb  2.2<L>  /  TBili  0.6  /  DBili  x   /  AST  14  /  ALT  22  /  AlkPhos  134<H>  05-11    Creatinine Trend: 0.69<--, 0.69<--, 0.57<--  PT/INR - ( 09 May 2023 23:30 )   PT: 12.5 sec;   INR: 1.08 ratio         PTT - ( 09 May 2023 23:30 )  PTT:33.1 sec    Arterial Blood Gas:  05-11 @ 02:15  7.47/40/144/29/99.3/5.0  ABG lactate: --  Arterial Blood Gas:  05-10 @ 18:30  7.40/45/195/28/99.4/2.7  ABG lactate: --  Arterial Blood Gas:  05-10 @ 15:30  7.45/43/214/30/97.7/5.4  ABG lactate: --  Arterial Blood Gas:  05-10 @ 04:25  7.53/35/113/29/97.9/6.1  ABG lactate: --    Venous Blood Gas:  05-09 @ 23:30  7.38/49/63/29/89.4  VBG Lactate: 2.0

## 2023-05-11 NOTE — PROCEDURE NOTE - SUPERVISORY STATEMENT
Patient critically ill requiring frequent ABGs and hemodynamic monitoring on Angie
Critically ill with hypoxemic respiratory failure requiring hemodynamic monitoring and frequent ABG

## 2023-05-11 NOTE — PROGRESS NOTE ADULT - ATTENDING COMMENTS
Patient seen and examined. Patient critically ill requiring frequent bedside visits with therapy change. Patient is a 62F with extensive history including DM2, HTN, HLD, obesity, non-obstructive CAD, ALMA on CPAP, and ILD (NSIP) thought secondary to prior Methotrexate use, RA and Psoriatic arthritis who is transferred from [facility] for acute hypoxemic respiratory failure in the setting of recent MRSA bacteremia and suspected ILD exacerbation.    Patient with persistent hypoxemic respiratory failure and desaturation with minimal exertion. Additional information obtained from [employer]. Patient was taken off Sildenafil because thought was primarily related to Group II. Patient was on Torsemide. She also has CT scan with notable mosaic attenuation on the L lung.       #Pulmonary - acute hypoxemic respiratory failure  - CT images from [facility] - last CT reported from 4/24/23 - reviewed with evidence of ILD and mosaic attenuation on the L  - Continue HFNC 60L/80% and taper - was on 40L/70% at [facility] alternating with BIPAP 12/5 70% - now on Angie with HFNC - will attempt to increase Angie slowly to 40 ppm if able to tolerate  - Continue to monitor ABG  - Maintain O2 sat > 90% as able  - Bronchodilators  - Continue steroids for ILD with PCP prophylaxis. Check fungitell as it is unclear whether patient was on PCP prophylaxis at [facility] or [facility]. LDH elevated.  - Pulmonary hypertension - repeat echocardiogram noted - has been off Sildenafil > 1 month. Symptomatically improved on Angie but continues to have high O2 requirements. Will ultimately require RHC once volume status improved  - Patient has been seen by Dr. Keshia Kim at [facility] a few times since 1/2023.  - May ultimately need RHC. Will continue diuresis with goal net negative. Aceves placed for closer I/O monitoring. Diuresis with goal negative 1-2L  - If no clear explanation for hypoxemia will need to consider CTPA as patient has been hospitalized on and off since early April - however at this point unable to lay flat for CTPA. Therefore started on therapeutic AC (close monitoring of Hb which was lower this AM but with reported brown stools)    #Infectious Disease - patient with reported MRSA bacteremia  - ID evaluation. Continue Dapto and Frankie. Rifampin stopped. Check CK  - Culture results from [facility] reviewed - had persistent bacteremia which cleared prior to transfer.  - Repeat cultures here.   - RVP negative  - Check Fungitell  - Start PCP prophylaxis with Mepron. If fungitell increased may need to empirically start PCP treatment    #Cardiac - nonobstructive CAD, reported diastolic dysfunction, and pulmonary hypertension  - echocardiogram reviewed  - Prior increase in PCWP on cath with Angie may suggest a more significant diastolic dysfunction than known  - aggressive diuresis with goal net negative    #Endocrine  - Continue Lantus as dosed from [facility]  - Check FS and A1c  - Diabetic diet    #Gastro - patient tolerating PO  - PPI as was on this at home  - Bowel regimen    #DVT proph - continue therapeutic AC  - Close monitoring of Hb and transfuse as needed.   - Check FOBT. Stools brown. If continued drop in Hb or no appropriate response to transfusion may need to D/C AC    Patient is at high risk for clinical deterioration. Patient is critically ill. Frequent bedside visits through the day.

## 2023-05-11 NOTE — PROGRESS NOTE ADULT - ASSESSMENT
This is a 63 y/o F with PMH of DM2, HTN, HLD, obesity, non-obstructive CAD, ALMA on CPAP, and ILD thought secondary to prior Methotrexate use, RA and Psoriatic arthritis who was [place] [facility] [place] for acute hypoxemic respiratory failure in the setting of recent MRSA bacteremia and suspected ILD exacerbation    Neuro  Alerted and oriented x4 at baseline, hx of pain RRT on 4/21 at [facility] for left facial droop, found to have left sided carotid stenosis only   -currently alert and oriented   - Palliative care was consulted at OSH for multimodal pain management for diffuse arthralgias/myalgias possible 2/2 RA vs fibromyalgia    - c/w Cymbalta, amitriptyline, lidocaine patch, naproxen, tylenol   -PT consult placed   -encourage OOB to chair if tolerated     Pulmonary    Hx of past smoker, ALMA on CPAP, ILD 2/2 ?methotrexate use, pulmonary HTN (remotely on sildenafil stopped ~1 month ago)  who presented to [facility] on 4/10 for acute hypoxemic respiratory failure was on 40L/70% at [facility] alternating with BIPAP 12/5 70%   -started on NO 5/10 tolerating increase to 20PPM, now able to tolerate laying flat   - Continue HFNC 60L/ titrating fio2 ~60-70% and taper as tolerated  -nocturnal Bipap but was able to use overnight d/t NO   - Bronchodilators prn   - Continue steroids for ILD currently on solumedrol 40mg Q8h   -f/u andreas   -follows Dr. Kim at [facility] who has reportedly been caring for patient  - If no clear explanation for hypoxemia will obtain CTPA as patient has been hospitalized on and off since early April    Cardiac   Hx of HTN and nonobstructive CAD, reported diastolic dysfunction, and pulmonary hypertension (last left/right sided cath at [facility] 12/2022)  - will restart statin   - c/w metoprolol tartrate  - holding nifedipine given normotension   - will f/u official ECHO results from today   - pro-BNP 22658 and diurese today and as needed       GI  No acute issues  -tolerating diet   - Was reportedly on PPI at home will continue while on steroids   -active BM    /Renal  No SHAAN   -replete electrolytes if needed   -currently voiding  -c/w diuresing     Endocrine   DM2 A1c 7.1, hyperglycemia on steroids, reported to have hypercalcemia 2/2 left parathyroid adenoma s/p Alendronate x1   - Continue Lantus 12units/5units premeal and SS will increase as needed   -prednisone 40mg daily changed to solumedrol 40mg Q8h on 5/10  - s/p Alendronate x1 with resolved hypercalcemia will continue to monitor    - Thyroid nodule noted on imaging - will need outpatient follow-up    Rheumatology   Hx of RA/psoriatic arthritis diagnosed ~2016, now seronegative, follows with Dr. Rush outpatient, failed multiple modalities (methotrexate, humira, xeljanz and rituximab due to insurance issues) recently on leflunomide.   -OSH records showed patient was negative  Kassi-1, SS-B, SS-A, RNP, SIRISHA, and RF   -will repeat RF, SIRISHA, Double stranded DNA, neutrophil cytoplasmic antibody, myomarker panel     Infectious Disease   MRSA bacteremia at [facility]. Cultures grew MRSA and was initially treated with vancomycin (4/21-4/25 and 5/3-5/8) but continued to have positive blood cultures x5 and subsequently started on Daptomycin on 4/25 with dose increase on 5/1. As per OSH records, the plan was to continue the Daptomycin for a 6 week course and Rifampin (started on 5/1) for a 14 day course.  -will consult ID   - continue current antibiotics: Daptomycin (5/1-   ) and empiric Meropenem (5/8-  )  - Will need to obtain culture results from [facility]  -f/u repeat blood cultures here   - Check RVP negative  - f/u Fungitell  -c/w PCP prophylaxis with Mepron      Heme/DVT PPX  Anemia   -drop in Hgb overnight, s/p 1u PRBC will f/u CBC's today   -c/w lovenox SQ  -will f/u iron studies       GOC:   -Full Code - MOLST from [facility] confirmed with patient  -HCP filled out with daughter Joyce Alicea as primary and secondary is her sister Emi Sánchez    This is a 63 y/o F with PMH of DM2, HTN, HLD, obesity, non-obstructive CAD, ALMA on CPAP, and ILD thought secondary to prior Methotrexate use, RA and Psoriatic arthritis who was transferred from [facility] on 5/9 for acute hypoxemic respiratory failure in the setting of recent MRSA bacteremia and suspected ILD exacerbation    Neuro  Alerted and oriented x4 at baseline, hx of pain RRT on 4/21 at [facility] for left facial droop, found to have left sided carotid stenosis only   -currently alert and oriented   - Palliative care was consulted at OSH for multimodal pain management for diffuse arthralgias/myalgias possible 2/2 RA vs fibromyalgia    - c/w Cymbalta, amitriptyline, lidocaine patch, naproxen, tylenol   -PT consult placed   -encourage OOB to chair if tolerated     Pulmonary    Hx of past smoker, ALMA on CPAP, ILD 2/2 ?methotrexate use, pulmonary HTN (remotely on sildenafil stopped ~1 month ago)  who presented to [facility] on 4/10 for acute hypoxemic respiratory failure was on 40L/70% at [facility] alternating with BIPAP 12/5 70%, episodes of severe hypoxia and tachypnea when lying flat (~55-70%) started on NO on 5/10 with improvement.     -c/w NO with goal to increase to 35-40PPM  - Continue HFNC 60L/ titrating fio2 ~60-70% and taper as tolerated  -nocturnal Bipap but was able to use overnight d/t NO   - Bronchodilators prn   - Continue steroids for ILD currently on solumedrol 40mg Q8h   -f/u andreas   -follows Dr. Kim at [facility] who has reportedly been caring for patient  - will eventually obtain CTPA as patient has been hospitalized on and off since early April    Cardiac   Hx of HTN and nonobstructive CAD, reported diastolic dysfunction, and pulmonary hypertension (last left/right sided cath at [facility] 12/2022)  - will restart statin   - c/w metoprolol tartrate  - holding nifedipine given normotension   - official ECHO 5/10 with Normal left ventricular systolic function, right ventricular enlargement with decreased right  ventricular systolic function and severe tricuspid regurgitation. Estimated pulmonary artery systolic pressure equals 96 mm evidence of severe pulmonary hypertension.  - pro-BNP 03313 on 5/10  -c/w aggressive diuresis with lasix 40mg BID to aim for net negative 1-2L    GI  No acute issues  -tolerating diet   - Was reportedly on PPI at home will continue while on steroids   -active BM    /Renal  No SHAAN   -c/w aggressive diuresis with lasix 40mg BID to aim for net negative 1-2L  -replete electrolytes and trend Q8h while diuresing   -muñiz in place with good UO       Endocrine   DM2 A1c 7.1, hyperglycemia on steroids, reported to have hypercalcemia 2/2 left parathyroid adenoma s/p Alendronate x1   - Continue Lantus 12units/5units premeal and SS will increase as needed   -prednisone 40mg daily changed to solumedrol 40mg Q8h on 5/10  - s/p Alendronate x1 with resolved hypercalcemia will continue to monitor    - Thyroid nodule noted on imaging - will need outpatient follow-up    Rheumatology   Hx of RA/psoriatic arthritis diagnosed ~2016, now seronegative, follows with Dr. Rush outpatient, failed multiple modalities (methotrexate, humira, xeljanz and rituximab due to insurance issues) recently on leflunomide.   -OSH records showed patient was negative  Kassi-1, SS-B, SS-A, RNP, SIRISHA, and RF   -repeated RF, SIRISHA, Double stranded DNA, neutrophil cytoplasmic antibody, myomarker panel     Infectious Disease   MRSA bacteremia at [facility]. Cultures grew MRSA and was initially treated with vancomycin (4/21-4/25 and 5/3-5/8) but continued to have positive blood cultures x5 and subsequently started on Daptomycin on 4/25 with dose increase on 5/1. As per OSH records, the plan was to continue the Daptomycin for a 6 week course and Rifampin (started on 5/1) for a 14 day course.  - continue current antibiotics: Daptomycin (5/1-   ) and empiric Meropenem (5/8-  )  -CK 22   - Will need to obtain culture results from [facility]  -f/u repeat blood cultures here   - RVP negative  - f/u Fungitell given LDH elevated to 743   -c/w PCP prophylaxis with Mepron  -ID following      Heme/DVT PPX  Anemia   -drop in Hgb overnight, s/p 1u PRBC will f/u CBC's today   -c/w full dose lovenox SQ for now   -will f/u iron studies       GOC:   -Full Code - MOLST from [facility] confirmed with patient  -HCP filled out with daughter Joyce Hendrix-Cordelia as primary and secondary is her sister Emi Garciard

## 2023-05-12 NOTE — PROGRESS NOTE ADULT - ATTENDING COMMENTS
Patient seen and examined. Patient critically ill requiring frequent bedside visits with therapy change. Patient is a 62F with extensive history including DM2, HTN, HLD, obesity, non-obstructive CAD, ALMA on CPAP, and ILD (NSIP) thought secondary to prior Methotrexate use, RA and Psoriatic arthritis who is transferred from [facility] for acute hypoxemic respiratory failure in the setting of recent MRSA bacteremia and suspected ILD exacerbation.    Patient with persistent hypoxemic respiratory failure and desaturation with minimal exertion. Additional information obtained from [employer]. Patient was taken off Sildenafil because thought was primarily related to Group II. Patient was on Torsemide. She also has CT scan with notable mosaic attenuation on the L lung.     #Pulmonary - acute hypoxemic respiratory failure  - CT images from [facility] - last CT reported from 4/24/23 - reviewed with evidence of ILD and mosaic attenuation on the L  - Continue HFNC 60L/80% and taper - was on 40L/70% at [facility] alternating with BIPAP 12/5 70% - now on Angie with HFNC - Will maintain Angie at 40 ppm.   - PO2 significantly better on 80% FiO2 compared to 70%  - Maintain O2 sat > 90% as able  - Bronchodilators  - Continue steroids for ILD and started patient on treatment dose Bactrim given increased LDH and Fungitell. Pending sputum PCP PRC if able.  - Pulmonary hypertension - repeat echocardiogram noted - has been off Sildenafil > 1 month. Symptomatically improved on Angie but continues to have high O2 requirements. Will add Sildenafil 10mg TID. Will ultimately require RHC once volume status improved  - Patient has been seen by Dr. Keshia Kim at [facility] a few times since 1/2023.  - May ultimately need RHC. Will continue diuresis with goal net negative. Aceves placed for closer I/O monitoring. Diuresis with goal negative 1.5-2.5L  - If no clear explanation for hypoxemia will need to consider CTPA as patient has been hospitalized on and off since early April - however at this point unable to lay flat for CTPA. Therefore started on therapeutic AC (close monitoring of Hb which was lower this AM but with reported brown stools)    #Infectious Disease - patient with reported MRSA bacteremia  - ID evaluation. Continue Dapto and Frankie. Rifampin stopped. CK normal  - Culture results from [facility] reviewed - had persistent bacteremia which cleared prior to transfer.  - Repeat cultures here thus far negative  - RVP negative  - LDH and Fungitell elevated - started on treatment dose Bactrim 5/11. PO utilized to decrease fluid burden.    #Cardiac - nonobstructive CAD, reported diastolic dysfunction, and pulmonary hypertension  - echocardiogram reviewed  - Prior increase in PCWP on cath with Angie may suggest a more significant diastolic dysfunction than known  - aggressive diuresis with goal net negative    #Endocrine  - Increase Lantus to 14 and monitor FS  - Check FS and sliding scale  - Diabetic diet    #Gastro - patient tolerating PO  - PPI as was on this at home  - Bowel regimen    #DVT proph - continue therapeutic AC  - Close monitoring of Hb and transfuse as needed.   - FOBT positive but stools brown. If continued drop in Hb or no appropriate response to transfusion may need to D/C AC  - Monitor ecchymosis on arm    Patient is at high risk for clinical deterioration. Patient is critically ill. Frequent bedside visits through the day.

## 2023-05-12 NOTE — DIETITIAN INITIAL EVALUATION ADULT - OTHER INFO
Met with Pt.   @ bedside.    Spoke w RN.  Reports Pt. with fairly good PO intake (>50% of meals).   provides assistance with feeding.  Pt. with reported "over 100lbs" weight loss in past ~1 year PTA.  However usual body weight not specified when asked.  Encouraged calorie/nutrient dense foods.  Pt. declines Glucerna supplement when offered.

## 2023-05-12 NOTE — DIETITIAN INITIAL EVALUATION ADULT - PERTINENT MEDS FT
MEDICATIONS  (STANDING):  amitriptyline 10 milliGRAM(s) Oral at bedtime  atorvastatin 20 milliGRAM(s) Oral at bedtime  buMETAnide Injectable 2 milliGRAM(s) IV Push every 8 hours  chlorhexidine 2% Cloths 1 Application(s) Topical <User Schedule>  cholecalciferol 1000 Unit(s) Oral daily  DAPTOmycin IVPB      DAPTOmycin IVPB 650 milliGRAM(s) IV Intermittent every 24 hours  dextrose 5%. 1000 milliLiter(s) (50 mL/Hr) IV Continuous <Continuous>  dextrose 5%. 1000 milliLiter(s) (100 mL/Hr) IV Continuous <Continuous>  dextrose 50% Injectable 12.5 Gram(s) IV Push once  dextrose 50% Injectable 25 Gram(s) IV Push once  dextrose 50% Injectable 25 Gram(s) IV Push once  DULoxetine 30 milliGRAM(s) Oral daily  enoxaparin Injectable 90 milliGRAM(s) SubCutaneous every 12 hours  glucagon  Injectable 1 milliGRAM(s) IntraMuscular once  insulin glargine Injectable (LANTUS) 14 Unit(s) SubCutaneous at bedtime  insulin lispro (ADMELOG) corrective regimen sliding scale   SubCutaneous three times a day before meals  insulin lispro (ADMELOG) corrective regimen sliding scale   SubCutaneous at bedtime  insulin lispro Injectable (ADMELOG) 5 Unit(s) SubCutaneous three times a day before meals  lactobacillus acidophilus 1 Tablet(s) Oral three times a day with meals  lidocaine   4% Patch 1 Patch Transdermal daily  meropenem  IVPB 1000 milliGRAM(s) IV Intermittent every 8 hours  methylPREDNISolone sodium succinate Injectable 40 milliGRAM(s) IV Push every 8 hours  metoprolol tartrate 25 milliGRAM(s) Oral three times a day  mupirocin 2% Ointment 1 Application(s) Topical two times a day  trimethoprim  40 mG/sulfamethoxazole 200 mG Suspension 400 milliGRAM(s) Oral every 8 hours    MEDICATIONS  (PRN):  albuterol/ipratropium for Nebulization 3 milliLiter(s) Nebulizer every 6 hours PRN Shortness of Breath and/or Wheezing  bisacodyl Suppository 10 milliGRAM(s) Rectal daily PRN Constipation  dextrose Oral Gel 15 Gram(s) Oral once PRN Blood Glucose LESS THAN 70 milliGRAM(s)/deciliter  naproxen 500 milliGRAM(s) Oral two times a day PRN Moderate Pain (4 - 6)

## 2023-05-12 NOTE — DIETITIAN INITIAL EVALUATION ADULT - PERTINENT LABORATORY DATA
05-12    134<L>  |  95<L>  |  16  ----------------------------<  248<H>  3.8   |  27  |  0.72    Ca    8.9      12 May 2023 10:00  Phos  2.2     05-12  Mg     2.10     05-12    TPro  6.0  /  Alb  2.2<L>  /  TBili  0.4  /  DBili  x   /  AST  15  /  ALT  20  /  AlkPhos  134<H>  05-12    CAPILLARY BLOOD GLUCOSE    POCT Blood Glucose.: 232 mg/dL (12 May 2023 08:16)  POCT Blood Glucose.: 233 mg/dL (11 May 2023 21:33)  POCT Blood Glucose.: 276 mg/dL (11 May 2023 17:02)  POCT Blood Glucose.: 179 mg/dL (11 May 2023 11:44)    A1C with Estimated Average Glucose Result: 7.1 % (05-10-23 @ 04:25)

## 2023-05-12 NOTE — PROGRESS NOTE ADULT - SUBJECTIVE AND OBJECTIVE BOX
CHIEF COMPLAINT:    Interval Events: No NO 35 ppm, no acute complaints     REVIEW OF SYSTEMS:  Constitutional: [x ] negative [ ] fevers [ ] chills [ ] weight loss [ ] weight gain  HEENT: [x ] negative [ ] dry eyes [ ] eye irritation [ ] postnasal drip [ ] nasal congestion  CV: [x ] negative  [ ] chest pain [ ] orthopnea [ ] palpitations [ ] murmur  Resp: [x ] negative [ ] cough [ ] shortness of breath [ ] dyspnea [ ] wheezing [ ] sputum [ ] hemoptysis  GI: [x ] negative [ ] nausea [ ] vomiting [ ] diarrhea [ ] constipation [ ] abd pain [ ] dysphagia   : [x ] negative [ ] dysuria [ ] nocturia [ ] hematuria [ ] increased urinary frequency  Musculoskeletal: [x ] negative [ ] back pain [ ] myalgias [ ] arthralgias [ ] fracture  Skin: [x ] negative [ ] rash [ ] itch  Neurological: [x ] negative [ ] headache [ ] dizziness [ ] syncope [ ] weakness [ ] numbness  Psychiatric: [x ] negative [ ] anxiety [ ] depression  Endocrine: [x ] negative [ ] diabetes [ ] thyroid problem  Hematologic/Lymphatic: [ x] negative [ ] anemia [ ] bleeding problem  Allergic/Immunologic: [x ] negative [ ] itchy eyes [ ] nasal discharge [ ] hives [ ] angioedema  [ ] All other systems negative  [ ] Unable to assess ROS because ________    OBJECTIVE:  ICU Vital Signs Last 24 Hrs  T(C): 36.6 (12 May 2023 08:00), Max: 37 (12 May 2023 04:00)  T(F): 97.9 (12 May 2023 08:00), Max: 98.6 (12 May 2023 04:00)  HR: 88 (12 May 2023 08:00) (85 - 109)  BP: --  BP(mean): --  ABP: 121/68 (12 May 2023 08:00) (99/60 - 139/77)  ABP(mean): 88 (12 May 2023 08:00) (76 - 104)  RR: 31 (12 May 2023 08:13) (16 - 31)  SpO2: 88% (12 May 2023 08:13) (86% - 100%)    O2 Parameters below as of 12 May 2023 08:13  Patient On (Oxygen Delivery Method): nasal cannula, high flow  O2 Flow (L/min): 60  O2 Concentration (%): 70          05-11 @ 07:01  -  05-12 @ 07:00  --------------------------------------------------------  IN: 2500 mL / OUT: 4175 mL / NET: -1675 mL    05-12 @ 07:01 - 05-12 @ 09:10  --------------------------------------------------------  IN: 0 mL / OUT: 300 mL / NET: -300 mL      CAPILLARY BLOOD GLUCOSE      POCT Blood Glucose.: 232 mg/dL (12 May 2023 08:16)      PHYSICAL EXAM:  GENERAL: NAD, well-groomed, well-developed  EYES: EOMI, PERRLA, conjunctiva and sclera clear  ENT: On HFNC   CHEST/LUNG: Clear to auscultation bilaterally; No rales, rhonchi, wheezing, or rubs  HEART: Regular rate and rhythm; No murmurs, rubs, or gallops  ABDOMEN: Soft, Nontender, Nondistended; Bowel sounds present  VASCULAR:  2+ Peripheral Pulses, No clubbing, cyanosis, or edema  LYMPH: No lymphadenopathy noted  SKIN: No rashes or lesions  NERVOUS SYSTEM:  Alert & Oriented X3, Good concentration;    LINES:    HOSPITAL MEDICATIONS:  enoxaparin Injectable 90 milliGRAM(s) SubCutaneous every 12 hours    DAPTOmycin IVPB      DAPTOmycin IVPB 650 milliGRAM(s) IV Intermittent every 24 hours  meropenem  IVPB 1000 milliGRAM(s) IV Intermittent every 8 hours  trimethoprim / sulfamethoxazole IVPB 400 milliGRAM(s) IV Intermittent every 8 hours    buMETAnide Injectable 2 milliGRAM(s) IV Push every 8 hours  metoprolol tartrate 25 milliGRAM(s) Oral three times a day    atorvastatin 20 milliGRAM(s) Oral at bedtime  dextrose 50% Injectable 12.5 Gram(s) IV Push once  dextrose 50% Injectable 25 Gram(s) IV Push once  dextrose 50% Injectable 25 Gram(s) IV Push once  dextrose Oral Gel 15 Gram(s) Oral once PRN  glucagon  Injectable 1 milliGRAM(s) IntraMuscular once  insulin glargine Injectable (LANTUS) 12 Unit(s) SubCutaneous at bedtime  insulin lispro (ADMELOG) corrective regimen sliding scale   SubCutaneous at bedtime  insulin lispro (ADMELOG) corrective regimen sliding scale   SubCutaneous three times a day before meals  insulin lispro Injectable (ADMELOG) 5 Unit(s) SubCutaneous three times a day before meals  methylPREDNISolone sodium succinate Injectable 40 milliGRAM(s) IV Push every 8 hours    albuterol/ipratropium for Nebulization 3 milliLiter(s) Nebulizer every 6 hours PRN    amitriptyline 10 milliGRAM(s) Oral at bedtime  DULoxetine 30 milliGRAM(s) Oral daily  naproxen 500 milliGRAM(s) Oral two times a day PRN    bisacodyl Suppository 10 milliGRAM(s) Rectal daily PRN        cholecalciferol 1000 Unit(s) Oral daily  dextrose 5%. 1000 milliLiter(s) IV Continuous <Continuous>  dextrose 5%. 1000 milliLiter(s) IV Continuous <Continuous>      chlorhexidine 2% Cloths 1 Application(s) Topical <User Schedule>  lidocaine   4% Patch 1 Patch Transdermal daily  mupirocin 2% Ointment 1 Application(s) Topical two times a day    lactobacillus acidophilus 1 Tablet(s) Oral three times a day with meals      LABS:                        8.6    10.39 )-----------( 217      ( 12 May 2023 01:00 )             28.7     Hgb Trend: 8.6<--, 8.6<--, 8.3<--, 6.7<--, 6.9<--  05-12    137  |  98  |  17  ----------------------------<  246<H>  4.1   |  27  |  0.64    Ca    9.1      12 May 2023 01:00  Phos  2.2     05-12  Mg     2.10     05-12    TPro  6.3  /  Alb  2.2<L>  /  TBili  0.4  /  DBili  x   /  AST  13  /  ALT  21  /  AlkPhos  144<H>  05-12    Creatinine Trend: 0.64<--, 0.72<--, 0.67<--, 0.69<--, 0.69<--, 0.57<--      Arterial Blood Gas:  05-12 @ 01:10  7.45/42/58/29/90.2/4.7  ABG lactate: --  Arterial Blood Gas:  05-11 @ 18:00  7.44/44/109/30/98.7/5.2  ABG lactate: --  Arterial Blood Gas:  05-11 @ 11:15  7.43/45/139/30/--/5.0  ABG lactate: --  Arterial Blood Gas:  05-11 @ 09:15  7.45/40/59/28/90.1/3.5  ABG lactate: --  Arterial Blood Gas:  05-11 @ 02:15  7.47/40/144/29/99.3/5.0  ABG lactate: --  Arterial Blood Gas:  05-10 @ 18:30  7.40/45/195/28/99.4/2.7  ABG lactate: --  Arterial Blood Gas:  05-10 @ 15:30  7.45/43/214/30/97.7/5.4  ABG lactate: --    Fungitell: 246    MICROBIOLOGY:     RADIOLOGY:  [ ] Reviewed and interpreted by me    EKG: CHIEF COMPLAINT:    Interval Events: Angie 35 ppm, no acute complaints   - Respiratory status about the same  - Does better on 60L/80% than 60L/70% again overnight - PO2 this AM on 70% 54 with O2 sats 88-93  - Diuresing well on Bumex  - Tolerating PO  - Has an ecchymoses over R UE - will trace/outline  - No PRBC transfusion overnight - remains on Lovenox  - Fungitell elevated and started on Bactrim    REVIEW OF SYSTEMS:  Constitutional: [x ] negative [ ] fevers [ ] chills [ ] weight loss [ ] weight gain  HEENT: [x ] negative [ ] dry eyes [ ] eye irritation [ ] postnasal drip [ ] nasal congestion  CV: [x ] negative  [ ] chest pain [ ] orthopnea [ ] palpitations [ ] murmur  Resp: [ ] negative [ ] cough [x ] shortness of breath [x ] dyspnea [ ] wheezing [ ] sputum [ ] hemoptysis  GI: [x ] negative [ ] nausea [ ] vomiting [ ] diarrhea [ ] constipation [ ] abd pain [ ] dysphagia   : [x ] negative [ ] dysuria [ ] nocturia [ ] hematuria [ ] increased urinary frequency  Musculoskeletal: [x ] negative [ ] back pain [ ] myalgias [ ] arthralgias [ ] fracture  Skin: [x ] negative [ ] rash [ ] itch  Neurological: [x ] negative [ ] headache [ ] dizziness [ ] syncope [ ] weakness [ ] numbness  Psychiatric: [x ] negative [ ] anxiety [ ] depression  Endocrine: [] negative [ x] diabetes [ ] thyroid problem  Hematologic/Lymphatic: [ x] negative [ ] anemia [ ] bleeding problem  Allergic/Immunologic: [x ] negative [ ] itchy eyes [ ] nasal discharge [ ] hives [ ] angioedema  [x ] All other systems negative  [ ] Unable to assess ROS because ________    OBJECTIVE:  ICU Vital Signs Last 24 Hrs  T(C): 36.6 (12 May 2023 08:00), Max: 37 (12 May 2023 04:00)  T(F): 97.9 (12 May 2023 08:00), Max: 98.6 (12 May 2023 04:00)  HR: 88 (12 May 2023 08:00) (85 - 109)  BP: --  BP(mean): --  ABP: 121/68 (12 May 2023 08:00) (99/60 - 139/77)  ABP(mean): 88 (12 May 2023 08:00) (76 - 104)  RR: 31 (12 May 2023 08:13) (16 - 31)  SpO2: 88% (12 May 2023 08:13) (86% - 100%)    O2 Parameters below as of 12 May 2023 08:13  Patient On (Oxygen Delivery Method): nasal cannula, high flow  O2 Flow (L/min): 60  O2 Concentration (%): 70          05-11 @ 07:01  -  05-12 @ 07:00  --------------------------------------------------------  IN: 2500 mL / OUT: 4175 mL / NET: -1675 mL    05-12 @ 07:01  -  05-12 @ 09:10  --------------------------------------------------------  IN: 0 mL / OUT: 300 mL / NET: -300 mL      CAPILLARY BLOOD GLUCOSE      POCT Blood Glucose.: 232 mg/dL (12 May 2023 08:16)      PHYSICAL EXAM:  GENERAL: NAD, well-groomed, well-developed  EYES: EOMI, PERRLA, conjunctiva and sclera clear  ENT: On HFNC   CHEST/LUNG: Clear to auscultation bilaterally; No rales, rhonchi, wheezing, or rubs  HEART: Regular rate and rhythm; No murmurs, rubs, or gallops  ABDOMEN: Soft, Nontender, Nondistended; Bowel sounds present  VASCULAR:  2+ Peripheral Pulses, No clubbing, cyanosis, or edema  LYMPH: No lymphadenopathy noted  SKIN: No rashes or lesions  NERVOUS SYSTEM:  Alert & Oriented X3, Good concentration;    LINES:    HOSPITAL MEDICATIONS:  enoxaparin Injectable 90 milliGRAM(s) SubCutaneous every 12 hours    DAPTOmycin IVPB      DAPTOmycin IVPB 650 milliGRAM(s) IV Intermittent every 24 hours  meropenem  IVPB 1000 milliGRAM(s) IV Intermittent every 8 hours  trimethoprim / sulfamethoxazole IVPB 400 milliGRAM(s) IV Intermittent every 8 hours    buMETAnide Injectable 2 milliGRAM(s) IV Push every 8 hours  metoprolol tartrate 25 milliGRAM(s) Oral three times a day    atorvastatin 20 milliGRAM(s) Oral at bedtime  dextrose 50% Injectable 12.5 Gram(s) IV Push once  dextrose 50% Injectable 25 Gram(s) IV Push once  dextrose 50% Injectable 25 Gram(s) IV Push once  dextrose Oral Gel 15 Gram(s) Oral once PRN  glucagon  Injectable 1 milliGRAM(s) IntraMuscular once  insulin glargine Injectable (LANTUS) 12 Unit(s) SubCutaneous at bedtime  insulin lispro (ADMELOG) corrective regimen sliding scale   SubCutaneous at bedtime  insulin lispro (ADMELOG) corrective regimen sliding scale   SubCutaneous three times a day before meals  insulin lispro Injectable (ADMELOG) 5 Unit(s) SubCutaneous three times a day before meals  methylPREDNISolone sodium succinate Injectable 40 milliGRAM(s) IV Push every 8 hours    albuterol/ipratropium for Nebulization 3 milliLiter(s) Nebulizer every 6 hours PRN    amitriptyline 10 milliGRAM(s) Oral at bedtime  DULoxetine 30 milliGRAM(s) Oral daily  naproxen 500 milliGRAM(s) Oral two times a day PRN    bisacodyl Suppository 10 milliGRAM(s) Rectal daily PRN        cholecalciferol 1000 Unit(s) Oral daily  dextrose 5%. 1000 milliLiter(s) IV Continuous <Continuous>  dextrose 5%. 1000 milliLiter(s) IV Continuous <Continuous>      chlorhexidine 2% Cloths 1 Application(s) Topical <User Schedule>  lidocaine   4% Patch 1 Patch Transdermal daily  mupirocin 2% Ointment 1 Application(s) Topical two times a day    lactobacillus acidophilus 1 Tablet(s) Oral three times a day with meals      LABS:                        8.6    10.39 )-----------( 217      ( 12 May 2023 01:00 )             28.7     Hgb Trend: 8.6<--, 8.6<--, 8.3<--, 6.7<--, 6.9<--  05-12    137  |  98  |  17  ----------------------------<  246<H>  4.1   |  27  |  0.64    Ca    9.1      12 May 2023 01:00  Phos  2.2     05-12  Mg     2.10     05-12    TPro  6.3  /  Alb  2.2<L>  /  TBili  0.4  /  DBili  x   /  AST  13  /  ALT  21  /  AlkPhos  144<H>  05-12    Creatinine Trend: 0.64<--, 0.72<--, 0.67<--, 0.69<--, 0.69<--, 0.57<--      Arterial Blood Gas:  05-12 @ 01:10  7.45/42/58/29/90.2/4.7  ABG lactate: --  Arterial Blood Gas:  05-11 @ 18:00  7.44/44/109/30/98.7/5.2  ABG lactate: --  Arterial Blood Gas:  05-11 @ 11:15  7.43/45/139/30/--/5.0  ABG lactate: --  Arterial Blood Gas:  05-11 @ 09:15  7.45/40/59/28/90.1/3.5  ABG lactate: --  Arterial Blood Gas:  05-11 @ 02:15  7.47/40/144/29/99.3/5.0  ABG lactate: --  Arterial Blood Gas:  05-10 @ 18:30  7.40/45/195/28/99.4/2.7  ABG lactate: --  Arterial Blood Gas:  05-10 @ 15:30  7.45/43/214/30/97.7/5.4  ABG lactate: --    Fungitell: 246    MICROBIOLOGY:     RADIOLOGY:  [ ] Reviewed and interpreted by me    EKG:

## 2023-05-12 NOTE — CHART NOTE - NSCHARTNOTEFT_GEN_A_CORE
: Jamey Duncan    INDICATION: Acute Hypoxemic  Respiratory Failure    PROCEDURE:  [x ] LIMITED ECHO  [x ] LIMITED CHEST      FINDINGS:  Lungs: Bilateral B-lines with irregular pleura. Profuse but slightly decreased from prior. No pleural effusion  Cardiac: Poor windows. Grossly normal LV function, Large RV with septal flattening      INTERPRETATION:  RV dysfunction  Bilateral B-lines slightly decreased from prior - in setting of interstitial lung disease and volume overload    Images saved to [employer]    Attending Attestation:  Agree with above. I was present for the entire procedure and have reviewed all images.

## 2023-05-12 NOTE — DIETITIAN INITIAL EVALUATION ADULT - REASON FOR ADMISSION
61yo F w PMHx of ILD on 3.5L home O2, ALMA on CPAP & chronic prednisone, pHTN, DM2, RA presenting to [facility] for worsening dyspnea/hypoxia.  Tranferred to [facility] for further management.

## 2023-05-12 NOTE — PROGRESS NOTE ADULT - ASSESSMENT
62F with ILD on 3.5L home O2/CPAP and chronic prednisone, pulmonary HTN, DM2, RA, psoriatic arthritis presented to [facility] ED for worsening dyspnea and hypoxia April 2023, admitted for ILD with exacerbation and acute on chronic hypoxic respiratory failure. She was treated with high-dose corticosteroids and started on HFNC, course complicated by AMS, found to have demonstrated high grade L-sided carotid stenosis, course further complicated by worsening hypoxia requiring BIPAP and fever, transferred to MICU, found to have MRSA bacteremia (4/24), initially treated with Vancomycin, switched to Daptomycin (4/25) with subsequent increased dose on (5/1), Blood culture cleared on (5/4), suspected source was from superficial thrombophlebitis, seen on US (4/17: Superficial thrombus is seen in the left cephalic vein at the level of the forearm. No DVT), planned for 6 weeks course of dapto with Rifampin 300mg PO q12 for total of 2 week course, course again complicated by low grade fever 100.7F (5/8), added meropenem for empiric coverage. Eventually patient transferred to [facility], ID consulted for assistance.    Denies any cardiac device, hardware, or prosthetic joint replacement  has not received biologics for arthritis in last year     CXR with diffuse opacity consistent with ILD  US LUE (4/17): Superficial thrombus is seen in the left cephalic vein at the level of the forearm. No DVT  BCx (4/24, 4/25, 4/27, 4/28, 5/1, 5/2) positive for MRSA  BCx (5/4) no growth  TTE (5/10) without obvious vegetation   BCx 5/9 no growth to date     Antimicrobials :    DAPTOmycin IVPB    DAPTOmycin IVPB 650 every 24 hours  meropenem  IVPB 1000 every 8 hours  trimethoprim  40 mG/sulfamethoxazole 200 mG Suspension 400 every 8 hours      #MRSA Bacteremia   high grade at outside hospital   unclear source cultures here 5/9 no growth to date   #Acute Hypoxic Respiratory Failure 2/2 ILD  #Fever    - bacteremia source was suspected L thrombophlebitis at [place] , but still consider endocarditis in ddx no obvious joint infection though is a consideration in patient with psoriatic arthritis   - no evidence of pneumonia on imaging, but due to persistent hypoxia, would keep empiric treatment for presumed pneumonia   - bactrim added for possible pcp -elevated fungitell     RECOMMENDATIONS    - continue dapto  (CK normal range)  - continue bactrim po treatment dose  - sputum for PCP PCR  - follow 9/9 blood culture    ID service available over weekend

## 2023-05-12 NOTE — PROGRESS NOTE ADULT - ASSESSMENT
This is a 63 y/o F with PMH of DM2, HTN, HLD, obesity, non-obstructive CAD, ALMA on CPAP, and ILD thought secondary to prior Methotrexate use, RA and Psoriatic arthritis who was [place] [facility] [place] for acute hypoxemic respiratory failure in the setting of recent MRSA bacteremia and suspected ILD exacerbation    Neuro  Alerted and oriented x4 at baseline, hx of pain RRT on 4/21 at [facility] for left facial droop, found to have left sided carotid stenosis only   -currently alert and oriented   - Palliative care was consulted at OSH for multimodal pain management for diffuse arthralgias/myalgias possible 2/2 RA vs fibromyalgia    - c/w Cymbalta, amitriptyline, lidocaine patch, naproxen, tylenol   -PT consult placed   -encourage OOB to chair if tolerated     Pulmonary    Hx of past smoker, ALMA on CPAP, ILD 2/2 ?methotrexate use, pulmonary HTN (remotely on sildenafil stopped ~1 month ago)  who presented [place] [facility] [place]10 for acute hypoxemic respiratory failure was on 40L/70% at [facility] alternating with BIPAP 12/5 70%, episodes of severe hypoxia and tachypnea when lying flat (~55-70%) started on NO on 5/10 with improvement.     -c/w NO with goal to increase to 35-40PPM  - Continue HFNC 60L/ titrating fio2 ~60-70% and taper as tolerated  -nocturnal Bipap but was able to use overnight d/t NO   - Bronchodilators prn   - Continue steroids for ILD currently on solumedrol 40mg Q8h   - Fungitell elevated to 246  - Started on PCP treatment   -follows Dr. Kim at [facility] who has reportedly been caring for patient  - will eventually obtain CTPA as patient has been hospitalized on and off since early April    Cardiac   Hx of HTN and nonobstructive CAD, reported diastolic dysfunction, and pulmonary hypertension (last left/right sided cath at [facility] 12/2022)  - will restart statin   - c/w metoprolol tartrate  - holding nifedipine given normotension   - official ECHO 5/10 with Normal left ventricular systolic function, right ventricular enlargement with decreased right  ventricular systolic function and severe tricuspid regurgitation. Estimated pulmonary artery systolic pressure equals 96 mm evidence of severe pulmonary hypertension.  - pro-BNP 04459 on 5/10  -c/w aggressive diuresis with lasix 40mg BID to aim for net negative 1-2L    GI  No acute issues  -tolerating diet   - Was reportedly on PPI at home will continue while on steroids   -active BM    /Renal  No SHAAN   -c/w aggressive diuresis with lasix 40mg BID to aim for net negative 1-2L  -replete electrolytes and trend Q8h while diuresing   -muñiz in place with good UO       Endocrine   DM2 A1c 7.1, hyperglycemia on steroids, reported to have hypercalcemia 2/2 left parathyroid adenoma s/p Alendronate x1   - Continue Lantus 12units/5units premeal and SS will increase as needed   -prednisone 40mg daily changed to solumedrol 40mg Q8h on 5/10  - s/p Alendronate x1 with resolved hypercalcemia will continue to monitor    - Thyroid nodule noted on imaging - will need outpatient follow-up    Rheumatology   Hx of RA/psoriatic arthritis diagnosed ~2016, now seronegative, follows with Dr. Rush outpatient, failed multiple modalities (methotrexate, humira, xeljanz and rituximab due to insurance issues) recently on leflunomide.   -OSH records showed patient was negative  Kassi-1, SS-B, SS-A, RNP, SIRISHA, and RF   -repeated RF, SIRISHA, Double stranded DNA, neutrophil cytoplasmic antibody, myomarker panel     Infectious Disease   MRSA bacteremia at [facility]. Cultures grew MRSA and was initially treated with vancomycin (4/21-4/25 and 5/3-5/8) but continued to have positive blood cultures x5 and subsequently started on Daptomycin on 4/25 with dose increase on 5/1. As per OSH records, the plan was to continue the Daptomycin for a 6 week course and Rifampin (started on 5/1) for a 14 day course.  - continue current antibiotics: Daptomycin (5/1-   ) and empiric Meropenem (5/8-  ) and Bactrim (5/11- )  -CK 22   - Will need to obtain culture results from [facility]  -f/u repeat blood cultures here   - RVP negative  - LDH elevated to 743   - Fungitell elevated   - start on PCP treatment with Bactrim on 5/11  -ID following      Heme/DVT PPX  Anemia   -drop in Hgb overnight, s/p 1u PRBC will f/u CBC's today   -c/w full dose lovenox SQ for now   -will f/u iron studies       GOC:   -Full Code - MOLST from [facility] confirmed with patient  -HCP filled out with daughter Joyce Alicea as primary and secondary is her sister Emi Garciard    This is a 63 y/o F with PMH of DM2, HTN, HLD, obesity, non-obstructive CAD, ALMA on CPAP, and ILD thought secondary to prior Methotrexate use, RA and Psoriatic arthritis who was transferred from [facility] on 5/9 for acute hypoxemic respiratory failure in the setting of recent MRSA bacteremia and suspected ILD exacerbation    Neuro  Alerted and oriented x4 at baseline, hx of pain RRT on 4/21 at [facility] for left facial droop, found to have left sided carotid stenosis only   -currently alert and oriented   - Palliative care was consulted at OSH for multimodal pain management for diffuse arthralgias/myalgias possible 2/2 RA vs fibromyalgia    - c/w Cymbalta, amitriptyline, lidocaine patch, naproxen, tylenol   -PT consult placed   -encourage OOB to chair if tolerated     Pulmonary    Hx of past smoker, ALMA on CPAP, ILD 2/2 ?methotrexate use, pulmonary HTN (remotely on sildenafil stopped ~1 month ago)  who presented to [facility] on 4/10 for acute hypoxemic respiratory failure was on 40L/70% at [facility] alternating with BIPAP 12/5 70%, episodes of severe hypoxia and tachypnea when lying flat (~55-70%) started on NO on 5/10 with improvement.     -c/w NO with goal to increase to 35-40PPM  - Continue HFNC 60L/ titrating fio2 as needed - increase back to 80% and taper as tolerated  - nocturnal Bipap but was able to use overnight d/t NO - no significant hypercapnia on ABG  - Bronchodilators prn   - Continue steroids for ILD currently on solumedrol 40mg Q8h   - Fungitell elevated to 246 - started on PCP treatment with Bactrim - will d/w pharmacy if this can be transitioned to PO to decrease fluid load  - follows Dr. Kim at [facility] who has reportedly been caring for patient  - will eventually obtain CTPA as patient has been hospitalized on and off since early April - presently unable to lie flat therefore continue therapeutic AC if no contraindication    Cardiac   Hx of HTN and nonobstructive CAD, reported diastolic dysfunction, and pulmonary hypertension (last left/right sided cath at [facility] 12/2022)  - continue statin   - c/w metoprolol tartrate  - holding nifedipine given normotension   - official ECHO 5/10 with Normal left ventricular systolic function, right ventricular enlargement with decreased right  ventricular systolic function and severe tricuspid regurgitation. Estimated pulmonary artery systolic pressure equals 96 mm evidence of severe pulmonary hypertension.  - pro-BNP 39857 on 5/10  -c/w aggressive diuresis changed to Bumex 2mg IV Q8 with goal negative 1.5-2.5L - and monitoring of electrolytes    GI  No acute issues  -tolerating diet   - Was reportedly on PPI at home will continue while on steroids   -active BM    /Renal  No SHAAN   -c/w aggressive diuresis with Bumex 2mg IV Q8  -replete electrolytes and trend Q8h while diuresing   -muñiz in place with good UO       Endocrine   DM2 A1c 7.1, hyperglycemia on steroids, reported to have hypercalcemia 2/2 left parathyroid adenoma s/p Alendronate x1   - Continue 5units premeal and SS will increase as needed - Increase Lantus to 14 units  - prednisone 40mg daily changed to solumedrol 40mg Q8h on 5/10 - this will be more than enough for PCP treatment which was started 5/11  - s/p Alendronate x1 with resolved hypercalcemia will continue to monitor    - Thyroid nodule noted on imaging - will need outpatient follow-up    Rheumatology   Hx of RA/psoriatic arthritis diagnosed ~2016, now seronegative, follows with Dr. Rush outpatient, failed multiple modalities (methotrexate, humira, xeljanz and rituximab due to insurance issues) recently on leflunomide.   -OSH records showed patient was negative  Kassi-1, SS-B, SS-A, RNP, SIRISHA, and RF   -repeated RF, SIRISHA, Double stranded DNA, neutrophil cytoplasmic antibody, myomarker panel   - Now with negative RF - per Dr. Rush was previously seropositive    Infectious Disease   MRSA bacteremia at [facility]. Cultures grew MRSA and was initially treated with vancomycin (4/21-4/25 and 5/3-5/8) but continued to have positive blood cultures x5 and subsequently started on Daptomycin on 4/25 with dose increase on 5/1. As per OSH records, the plan was to continue the Daptomycin for a 6 week course and Rifampin (started on 5/1) for a 14 day course.  - continue current antibiotics: Daptomycin (5/1-   ) and empiric Meropenem (5/8-  ) and Bactrim (5/11- )  -CK 22   -f/u repeat blood cultures here   - RVP negative  - LDH elevated to 743   - Fungitell elevated   - start on PCP treatment with Bactrim on 5/11  - ID following      Heme/DVT PPX  Anemia   - Required PRBC transfusion 5/11 - has been stable since then  -c/w full dose lovenox SQ for now - and monitor Hb  -FOBT positive but stool is brown. Will monitor  -Ecchymoses noted over RUE - has good pulses. Will outline and monitor for change.  -No signs of hemolysis  -will f/u iron studies       GOC:   -Full Code - MOLST from [facility] confirmed with patient  -HCP filled out with daughter Joyce Alicea as primary and secondary is her sister Emi Sánchez   -D/w Patient and  at bedside today (5/12) and all questions answered

## 2023-05-12 NOTE — PROGRESS NOTE ADULT - SUBJECTIVE AND OBJECTIVE BOX
Follow Up:  MRSA bacteremia outside hospital     Interval History/ROS:  remains on HFNC   c/o pain right shoulder    ecchymosis right arm      at bedside    Allergies  penicillins (Other)        ANTIMICROBIALS:  DAPTOmycin IVPB    DAPTOmycin IVPB 650 every 24 hours  meropenem  IVPB 1000 every 8 hours  trimethoprim  40 mG/sulfamethoxazole 200 mG Suspension 400 every 8 hours      OTHER MEDS:  albuterol/ipratropium for Nebulization 3 milliLiter(s) Nebulizer every 6 hours PRN  amitriptyline 10 milliGRAM(s) Oral at bedtime  atorvastatin 20 milliGRAM(s) Oral at bedtime  bisacodyl Suppository 10 milliGRAM(s) Rectal daily PRN  buMETAnide Injectable 2 milliGRAM(s) IV Push every 8 hours  chlorhexidine 2% Cloths 1 Application(s) Topical <User Schedule>  cholecalciferol 1000 Unit(s) Oral daily  dextrose 5%. 1000 milliLiter(s) IV Continuous <Continuous>  dextrose 5%. 1000 milliLiter(s) IV Continuous <Continuous>  dextrose 50% Injectable 25 Gram(s) IV Push once  dextrose 50% Injectable 25 Gram(s) IV Push once  dextrose 50% Injectable 12.5 Gram(s) IV Push once  dextrose Oral Gel 15 Gram(s) Oral once PRN  DULoxetine 30 milliGRAM(s) Oral daily  enoxaparin Injectable 90 milliGRAM(s) SubCutaneous every 12 hours  glucagon  Injectable 1 milliGRAM(s) IntraMuscular once  insulin glargine Injectable (LANTUS) 14 Unit(s) SubCutaneous at bedtime  insulin lispro (ADMELOG) corrective regimen sliding scale   SubCutaneous three times a day before meals  insulin lispro (ADMELOG) corrective regimen sliding scale   SubCutaneous at bedtime  insulin lispro Injectable (ADMELOG) 5 Unit(s) SubCutaneous three times a day before meals  lactobacillus acidophilus 1 Tablet(s) Oral three times a day with meals  lidocaine   4% Patch 1 Patch Transdermal daily  methylPREDNISolone sodium succinate Injectable 40 milliGRAM(s) IV Push every 8 hours  metoprolol tartrate 25 milliGRAM(s) Oral three times a day  mupirocin 2% Ointment 1 Application(s) Topical two times a day  naproxen 500 milliGRAM(s) Oral two times a day PRN  sildenafil (REVATIO) 10 milliGRAM(s) Oral every 8 hours      Vital Signs Last 24 Hrs  T(C): 36.7 (12 May 2023 16:00), Max: 37 (12 May 2023 04:00)  T(F): 98 (12 May 2023 16:00), Max: 98.6 (12 May 2023 04:00)  HR: 83 (12 May 2023 18:00) (76 - 102)  BP: --  BP(mean): --  RR: 26 (12 May 2023 18:00) (16 - 32)  SpO2: 90% (12 May 2023 18:00) (86% - 99%)    Parameters below as of 12 May 2023 14:57  Patient On (Oxygen Delivery Method): nasal cannula, high flow  O2 Flow (L/min): 60  O2 Concentration (%): 80    PHYSICAL EXAM:  Constitutional: alert, awake, restless  HFNC  RS: few crackles   CVS: S1, S2   Abdomen: Soft. No guarding/rigidity/tenderness.  Extremities: ecchymosis right arm     lidoderm patch on right shoulder  no obvious joint effusions   Vascular: iv arms  Neuro: Alert, oriented to time/place/person  Cranial nerves 2-12 grossly normal. No focal abnormalities                          8.6    10.39 )-----------( 217      ( 12 May 2023 01:00 )             28.7       05-12    134<L>  |  95<L>  |  16  ----------------------------<  248<H>  3.8   |  27  |  0.72    Ca    8.9      12 May 2023 10:00  Phos  2.2     05-12  Mg     2.10     05-12    TPro  6.0  /  Alb  2.2<L>  /  TBili  0.4  /  DBili  x   /  AST  15  /  ALT  20  /  AlkPhos  134<H>  05-12      ckCreatine Kinase, Serum: 22 U/L (05.11.23 @ 02:15)     MICROBIOLOGY:    .Blood Blood-Peripheral  05-09-23   No growth to date.  --  --      .Blood Blood-Peripheral  05-09-23   No growth to date.  --  --        Rapid RVP Result: NotDete (05-10 @ 06:36)    Fungitell: 246:     RADIOLOGY:    r< from: Xray Chest 1 View- PORTABLE-Urgent (Xray Chest 1 View- PORTABLE-Urgent .) (05.09.23 @ 23:43) >    ACC: [id number] EXAM:  XR CHEST PORTABLE URGENT 1V   ORDERED BY: VERO RUFF     PROCEDURE DATE:  05/09/2023          INTERPRETATION:  CLINICAL INFORMATION: Hypoxia    TIME OF EXAMINATION: May 9 at 11:34 PM    EXAM: Portable chest    FINDINGS:  Low lung volumes with increased markings consistent with interstitial   lung disease considering past studies including chest CT.    No new focal consolidation to indicate pneumonia.    Heart size is stable. No effusions or pneumothorax        COMPARISON: May 8        IMPRESSION: Follow-up with diffuse opacities consistent with interstitial   lung disease.    --- End of Report ---            DARNELL VILLALPANDO MD; Attending Radiologist  This document has been electronically signed. May 10 2023  9:14AM    < end of copied text >

## 2023-05-13 NOTE — PROGRESS NOTE ADULT - ATTENDING COMMENTS
Patient is a 62F with extensive history including DM2, HTN, HLD, obesity, non-obstructive CAD, ALMA on CPAP, and ILD (NSIP) thought secondary to prior Methotrexate use, RA and Psoriatic arthritis who is transferred from [facility] for acute hypoxemic respiratory failure in the setting of recent MRSA bacteremia and suspected ILD exacerbation.    Patient with persistent hypoxemic respiratory failure and desaturation with minimal exertion. Additional information obtained from [facility]. Patient was taken off Sildenafil because thought was primarily related to Group II. Patient was on Torsemide. She also has CT scan with notable mosaic attenuation on the L lung.     #Pulmonary - acute hypoxemic respiratory failure  #pHTN - Patient has been seen by Dr. Keshia Kim at [facility] a few times since 1/2023.  - CT images from [facility] - last CT reported from 4/24/23 - reviewed with evidence of ILD and mosaic attenuation on the L  - Continue HFNC 60L/80% and wean as tolerated. Will maintain Angie at 40 ppm. SIldenafil 10mg TID added yesterday, will continue.   - Maintain O2 sat > 90% as able  - c/w Bronchodilators  - c/w steroids for ILD   - c/w treatment dose Bactrim given increased LDH and Fungitell. Pending sputum PCP PRC if able.  - Pulmonary hypertension - repeat echocardiogram noted - has been off Sildenafil > 1 month. Symptomatically improved on Angie but continues to have high O2 requirements. Will ultimately require RHC once volume status improved  - Diuresis to keep net negative. WIll decrease Bumex 2mg q8h to 1mg q6h as lactate trending up  - If no clear explanation for hypoxemia will need to consider CTPA as patient has been hospitalized on and off since early April - however at this point unable to lay flat for CTPA. Therefore started on therapeutic AC (close monitoring of Hb which was lower this AM but with reported brown stools)    #Infectious Disease - patient with reported MRSA bacteremia  - ID evaluation. Continue Dapto and Frankie. Rifampin stopped. CK normal  - Culture results from [facility] reviewed - had persistent bacteremia which cleared prior to transfer.  - Repeat cultures here thus far negative  - RVP negative  - LDH and Fungitell elevated - started on treatment dose Bactrim 5/11. PO utilized to decrease fluid burden.    #Cardiac - nonobstructive CAD, reported diastolic dysfunction, and pulmonary hypertension  - echocardiogram reviewed  - Prior increase in PCWP on cath with Angie may suggest a more significant diastolic dysfunction than known  - aggressive diuresis with goal net negative    #Endocrine  - c/w basal bolus insulin  - Check FS and sliding scale  - Diabetic diet    #Gastro - patient tolerating PO  - PPI as was on this at home  - Bowel regimen    #DVT proph - continue therapeutic AC  - Close monitoring of Hb and transfuse as needed.   - FOBT positive but stools brown. If continued drop in Hb or no appropriate response to transfusion may need to D/C AC  - Monitor ecchymosis on arm    Patient is at high risk for clinical deterioration. Patient is critically ill. Frequent bedside visits through the day.    Jack Jarquin MD  Pulmonary & Critical Care

## 2023-05-13 NOTE — PROGRESS NOTE ADULT - SUBJECTIVE AND OBJECTIVE BOX
Interval Events: No acute events reported overnight      HPI:  62 year old female with a PMHx of IDL on 3.5L home O2, ALMA on CPAP and chronic prednisone, pulmonary HTN, T2DM, RA, psoriatic arthritis who initially present to [facility] for worsening dyspnea/ hypoxia requiring HFCA and BIPAP and was transferred to [facility] further management She has had multiple recent hospitalizations for acute on chronic hypoxic respiratory failure 2/2 IDL flair/ PNA. During her hospitalization she had a rapid response called (on 4/21) for left facial droop and AMS c/f possible stroke. Imagining at the time demonstrated high grade L sided carotid stenosis for which pt and family decided not to pursue surgical intervention.   Pt was then found to be febrile and tachycardic, and was transferred to ICU for sepsis with multifactorial encephalopathy and AHRF. Pts blood cultures grew MRSA and she was initially treated with vancomycin (4/21-4/25 and 5/3-5/8) but continued to have positive blood cultures x5. She was also started on Merrem (4/21-4/23) and subsequently started on Daptomycin on 4/25 with dose increase on 5/1. Plan was to continue the Daptomycin for a 6 week course and Rifampin (started on 5/1) for a 14 day course. Of note, pt had negative blood cx x2 on 5/4/23. Pt was found to be febrile again on 5/8 with a rectal temp of 100.7. Fungitell was ordered and pt was started on Meropenem. Possible suspected source was superficial thrombophlebitis (seen on U/S in the left cephalic vein). ID recommended NM whole body scan/ indium scan and ELICEO to r/o other source of persistent bacteremia, however pt was unable to lie flat given her respiratory status and these studies were not pursued (At the time of transfer, pt remained on dapto, abeba, and rifampine)  Pt hospital course was further complicated by hypercalcemia likely PTH-mediated ISO a L parathyroid adenoma. She was started on IVF and given Alendronate x1 with improvement. Palliative care was also consulted for multimodal pain management for diffuse arthralgias/myalgias possible 2/2 RA vs fibromyalgia     (09 May 2023 23:13)      REVIEW OF SYSTEMS:  Constitutional: [ ] negative [ ] fevers [ ] chills [ ] weight loss [ ] weight gain  HEENT: [ ] negative [ ] dry eyes [ ] eye irritation [ ] postnasal drip [ ] nasal congestion  CV: [ ] negative  [ ] chest pain [ ] orthopnea [ ] palpitations [ ] murmur  Resp: [ ] negative [ ] cough [ ] shortness of breath [ ] dyspnea [ ] wheezing [ ] sputum [ ] hemoptysis  GI: [ ] negative [ ] nausea [ ] vomiting [ ] diarrhea [ ] constipation [ ] abd pain [ ] dysphagia   : [ ] negative [ ] dysuria [ ] nocturia [ ] hematuria [ ] increased urinary frequency  Musculoskeletal: [ ] negative [ ] back pain [ ] myalgias [ ] arthralgias [ ] fracture  Skin: [ ] negative [ ] rash [ ] itch  Neurological: [ ] negative [ ] headache [ ] dizziness [ ] syncope [ ] weakness [ ] numbness  Psychiatric: [ ] negative [ ] anxiety [ ] depression  Endocrine: [ ] negative [ ] diabetes [ ] thyroid problem  Hematologic/Lymphatic: [ ] negative [ ] anemia [ ] bleeding problem  Allergic/Immunologic: [ ] negative [ ] itchy eyes [ ] nasal discharge [ ] hives [ ] angioedema  [ ] All other systems negative  [ ] Unable to assess ROS because ________    OBJECTIVE:  ICU Vital Signs Last 24 Hrs  T(C): 36.1 (13 May 2023 04:00), Max: 36.7 (12 May 2023 12:00)  T(F): 97 (13 May 2023 04:00), Max: 98 (12 May 2023 12:00)  HR: 69 (13 May 2023 07:47) (69 - 99)  BP: --  BP(mean): --  ABP: 116/54 (13 May 2023 07:47) (91/52 - 138/76)  ABP(mean): 77 (13 May 2023 07:47) (67 - 201)  RR: 22 (13 May 2023 07:47) (17 - 41)  SpO2: 96% (13 May 2023 07:47) (88% - 99%)    O2 Parameters below as of 13 May 2023 07:47  Patient On (Oxygen Delivery Method): nasal cannula, high flow  O2 Flow (L/min): 60  O2 Concentration (%): 80          05-12 @ 07:01  -  05-13 @ 07:00  --------------------------------------------------------  IN: 350 mL / OUT: 2950 mL / NET: -2600 mL      CAPILLARY BLOOD GLUCOSE      POCT Blood Glucose.: 225 mg/dL (13 May 2023 07:38)      Physical Exam:   Constitutional: ill appearing, no acute distress   HEENT: + PERRLA, EOMI, no drainage or redness  Neck: supple,  No JVD  Respiratory: Ventilator assisted breath Sounds equal & clear bilaterally to auscultation, no accessory muscle use noted  Cardiovascular: Regular rate, regular rhythm, normal S1, S2; no murmurs or rub  Gastrointestinal: Soft, non-tender, non distended, no hepatosplenomegaly, normal bowel sounds  Extremities: + 2 peripheral edema, no cyanosis, no clubbing   Vascular: Equal and normal pulses: 2+ peripheral pulses throughout  Neurological: sedated/intubated  Psychiatric: calm, normal mood, normal affect  Musculoskeletal: No joint swelling or deformity; no limitation of movement  Skin: warm, dry, well perfused, no rashes    HOSPITAL MEDICATIONS:  Standing Meds:  amitriptyline 10 milliGRAM(s) Oral at bedtime  atorvastatin 20 milliGRAM(s) Oral at bedtime  buMETAnide Injectable 2 milliGRAM(s) IV Push every 8 hours  chlorhexidine 2% Cloths 1 Application(s) Topical <User Schedule>  cholecalciferol 1000 Unit(s) Oral daily  DAPTOmycin IVPB      DAPTOmycin IVPB 650 milliGRAM(s) IV Intermittent every 24 hours  dextrose 5%. 1000 milliLiter(s) IV Continuous <Continuous>  dextrose 5%. 1000 milliLiter(s) IV Continuous <Continuous>  dextrose 50% Injectable 12.5 Gram(s) IV Push once  dextrose 50% Injectable 25 Gram(s) IV Push once  dextrose 50% Injectable 25 Gram(s) IV Push once  DULoxetine 30 milliGRAM(s) Oral daily  enoxaparin Injectable 90 milliGRAM(s) SubCutaneous every 12 hours  glucagon  Injectable 1 milliGRAM(s) IntraMuscular once  insulin glargine Injectable (LANTUS) 14 Unit(s) SubCutaneous at bedtime  insulin lispro (ADMELOG) corrective regimen sliding scale   SubCutaneous at bedtime  insulin lispro (ADMELOG) corrective regimen sliding scale   SubCutaneous three times a day before meals  insulin lispro Injectable (ADMELOG) 5 Unit(s) SubCutaneous three times a day before meals  lactobacillus acidophilus 1 Tablet(s) Oral three times a day with meals  lidocaine   4% Patch 1 Patch Transdermal daily  meropenem  IVPB 1000 milliGRAM(s) IV Intermittent every 8 hours  methylPREDNISolone sodium succinate Injectable 40 milliGRAM(s) IV Push every 8 hours  metoprolol tartrate 25 milliGRAM(s) Oral three times a day  mupirocin 2% Ointment 1 Application(s) Topical two times a day  sildenafil (REVATIO) 10 milliGRAM(s) Oral every 8 hours  trimethoprim  40 mG/sulfamethoxazole 200 mG Suspension 400 milliGRAM(s) Oral every 8 hours      PRN Meds:  albuterol/ipratropium for Nebulization 3 milliLiter(s) Nebulizer every 6 hours PRN  bisacodyl Suppository 10 milliGRAM(s) Rectal daily PRN  dextrose Oral Gel 15 Gram(s) Oral once PRN  naproxen 500 milliGRAM(s) Oral two times a day PRN      LABS:                        8.5    8.67  )-----------( 186      ( 13 May 2023 01:05 )             27.9     Hgb Trend: 8.5<--, 8.3<--, 8.6<--, 8.6<--, 8.3<--  05-13    134<L>  |  93<L>  |  17  ----------------------------<  199<H>  3.9   |  30  |  0.78    Ca    9.3      13 May 2023 01:05  Phos  2.9     05-13  Mg     1.70     05-13    TPro  6.4  /  Alb  2.4<L>  /  TBili  0.4  /  DBili  x   /  AST  16  /  ALT  20  /  AlkPhos  136<H>  05-13    Creatinine Trend: 0.78<--, 0.80<--, 0.72<--, 0.64<--, 0.72<--, 0.67<--      Arterial Blood Gas:  05-13 @ 07:35  7.46/50/96/36/97.9/10.5  ABG lactate: --  Arterial Blood Gas:  05-13 @ 01:05  7.48/48/85/36/96.9/11.0  ABG lactate: --  Arterial Blood Gas:  05-12 @ 20:42  7.50/42/73/33/95.2/8.8  ABG lactate: --  Arterial Blood Gas:  05-12 @ 01:10  7.45/42/58/29/90.2/4.7  ABG lactate: --  Arterial Blood Gas:  05-11 @ 18:00  7.44/44/109/30/98.7/5.2  ABG lactate: --  Arterial Blood Gas:  05-11 @ 11:15  7.43/45/139/30/--/5.0  ABG lactate: --  Arterial Blood Gas:  05-11 @ 09:15  7.45/40/59/28/90.1/3.5  ABG lactate: --        MICROBIOLOGY:     Culture - Urine (collected 11 May 2023 03:00)  Source: Clean Catch Clean Catch (Midstream)  Final Report (12 May 2023 18:46):    No growth      RADIOLOGY:  [ ] Reviewed and interpreted by me           Interval Events: No acute events reported overnight      HPI:  62 year old female with a PMHx of IDL on 3.5L home O2, ALMA on CPAP and chronic prednisone, pulmonary HTN, T2DM, RA, psoriatic arthritis who initially present to [facility] for worsening dyspnea/ hypoxia requiring HFCA and BIPAP and was transferred to [facility] further management She has had multiple recent hospitalizations for acute on chronic hypoxic respiratory failure 2/2 IDL flair/ PNA. During her hospitalization she had a rapid response called (on 4/21) for left facial droop and AMS c/f possible stroke. Imagining at the time demonstrated high grade L sided carotid stenosis for which pt and family decided not to pursue surgical intervention.   Pt was then found to be febrile and tachycardic, and was transferred to ICU for sepsis with multifactorial encephalopathy and AHRF. Pts blood cultures grew MRSA and she was initially treated with vancomycin (4/21-4/25 and 5/3-5/8) but continued to have positive blood cultures x5. She was also started on Merrem (4/21-4/23) and subsequently started on Daptomycin on 4/25 with dose increase on 5/1. Plan was to continue the Daptomycin for a 6 week course and Rifampin (started on 5/1) for a 14 day course. Of note, pt had negative blood cx x2 on 5/4/23. Pt was found to be febrile again on 5/8 with a rectal temp of 100.7. Fungitell was ordered and pt was started on Meropenem. Possible suspected source was superficial thrombophlebitis (seen on U/S in the left cephalic vein). ID recommended NM whole body scan/ indium scan and ELICEO to r/o other source of persistent bacteremia, however pt was unable to lie flat given her respiratory status and these studies were not pursued (At the time of transfer, pt remained on dapto, abeba, and rifampine)  Pt hospital course was further complicated by hypercalcemia likely PTH-mediated ISO a L parathyroid adenoma. She was started on IVF and given Alendronate x1 with improvement. Palliative care was also consulted for multimodal pain management for diffuse arthralgias/myalgias possible 2/2 RA vs fibromyalgia     (09 May 2023 23:13)      REVIEW OF SYSTEMS:  Constitutional: [x] negative [ ] fevers [ ] chills [ ] weight loss [ ] weight gain  HEENT: [x] negative [ ] dry eyes [ ] eye irritation [ ] postnasal drip [ ] nasal congestion  CV: [x] negative  [ ] chest pain [ ] orthopnea [ ] palpitations [ ] murmur  Resp: [ ] negative [ ] cough [x] shortness of breath [ ] dyspnea [ ] wheezing [ ] sputum [ ] hemoptysis  GI: [x] negative [ ] nausea [ ] vomiting [ ] diarrhea [ ] constipation [ ] abd pain [ ] dysphagia   : [x] negative [ ] dysuria [ ] nocturia [ ] hematuria [ ] increased urinary frequency  Musculoskeletal: [ ] negative [x] back pain [ ] myalgias [ ] arthralgias [ ] fracture  Skin: [x] negative [ ] rash [ ] itch  Neurological: [x] negative [ ] headache [ ] dizziness [ ] syncope [ ] weakness [ ] numbness  Psychiatric: [x] negative [ ] anxiety [ ] depression  Endocrine: [x negative [ ] diabetes [ ] thyroid problem  Hematologic/Lymphatic: [x negative [ ] anemia [ ] bleeding problem  Allergic/Immunologic: [x]negative [ ] itchy eyes [ ] nasal discharge [ ] hives [ ] angioedema  [x All other systems negative  [ ] Unable to assess ROS because ________    OBJECTIVE:  ICU Vital Signs Last 24 Hrs  T(C): 36.1 (13 May 2023 04:00), Max: 36.7 (12 May 2023 12:00)  T(F): 97 (13 May 2023 04:00), Max: 98 (12 May 2023 12:00)  HR: 69 (13 May 2023 07:47) (69 - 99)  BP: --  BP(mean): --  ABP: 116/54 (13 May 2023 07:47) (91/52 - 138/76)  ABP(mean): 77 (13 May 2023 07:47) (67 - 201)  RR: 22 (13 May 2023 07:47) (17 - 41)  SpO2: 96% (13 May 2023 07:47) (88% - 99%)    O2 Parameters below as of 13 May 2023 07:47  Patient On (Oxygen Delivery Method): nasal cannula, high flow  O2 Flow (L/min): 60  O2 Concentration (%): 80          05-12 @ 07:01  -  05-13 @ 07:00  --------------------------------------------------------  IN: 350 mL / OUT: 2950 mL / NET: -2600 mL      CAPILLARY BLOOD GLUCOSE      POCT Blood Glucose.: 225 mg/dL (13 May 2023 07:38)      Physical Exam:   Constitutional: no acute distress   HEENT: + PERRLA, EOMI, no drainage or redness  Neck: supple,  No JVD  Respiratory: scattered Rhonchi B/L    Cardiovascular: Regular rate, regular rhythm, normal S1, S2; no murmurs or rub  Gastrointestinal: obese, soft, non-tender, non distended, no hepatosplenomegaly, normal bowel sounds  Extremities: + 2 peripheral edema, no cyanosis, no clubbing   Vascular: Equal and normal pulses: 2+ peripheral pulses throughout  Neurological: alert and oriented   Psychiatric: anxious at times   Musculoskeletal: No joint swelling or deformity; limitation of movement of lower extremities R>L  Skin: warm, dry, well perfused, no rashes, ecchymotic upper extremities         HOSPITAL MEDICATIONS:  Standing Meds:  amitriptyline 10 milliGRAM(s) Oral at bedtime  atorvastatin 20 milliGRAM(s) Oral at bedtime  buMETAnide Injectable 2 milliGRAM(s) IV Push every 8 hours  chlorhexidine 2% Cloths 1 Application(s) Topical <User Schedule>  cholecalciferol 1000 Unit(s) Oral daily  DAPTOmycin IVPB      DAPTOmycin IVPB 650 milliGRAM(s) IV Intermittent every 24 hours  dextrose 5%. 1000 milliLiter(s) IV Continuous <Continuous>  dextrose 5%. 1000 milliLiter(s) IV Continuous <Continuous>  dextrose 50% Injectable 12.5 Gram(s) IV Push once  dextrose 50% Injectable 25 Gram(s) IV Push once  dextrose 50% Injectable 25 Gram(s) IV Push once  DULoxetine 30 milliGRAM(s) Oral daily  enoxaparin Injectable 90 milliGRAM(s) SubCutaneous every 12 hours  glucagon  Injectable 1 milliGRAM(s) IntraMuscular once  insulin glargine Injectable (LANTUS) 14 Unit(s) SubCutaneous at bedtime  insulin lispro (ADMELOG) corrective regimen sliding scale   SubCutaneous at bedtime  insulin lispro (ADMELOG) corrective regimen sliding scale   SubCutaneous three times a day before meals  insulin lispro Injectable (ADMELOG) 5 Unit(s) SubCutaneous three times a day before meals  lactobacillus acidophilus 1 Tablet(s) Oral three times a day with meals  lidocaine   4% Patch 1 Patch Transdermal daily  meropenem  IVPB 1000 milliGRAM(s) IV Intermittent every 8 hours  methylPREDNISolone sodium succinate Injectable 40 milliGRAM(s) IV Push every 8 hours  metoprolol tartrate 25 milliGRAM(s) Oral three times a day  mupirocin 2% Ointment 1 Application(s) Topical two times a day  sildenafil (REVATIO) 10 milliGRAM(s) Oral every 8 hours  trimethoprim  40 mG/sulfamethoxazole 200 mG Suspension 400 milliGRAM(s) Oral every 8 hours      PRN Meds:  albuterol/ipratropium for Nebulization 3 milliLiter(s) Nebulizer every 6 hours PRN  bisacodyl Suppository 10 milliGRAM(s) Rectal daily PRN  dextrose Oral Gel 15 Gram(s) Oral once PRN  naproxen 500 milliGRAM(s) Oral two times a day PRN      LABS:                        8.5    8.67  )-----------( 186      ( 13 May 2023 01:05 )             27.9     Hgb Trend: 8.5<--, 8.3<--, 8.6<--, 8.6<--, 8.3<--  05-13    134<L>  |  93<L>  |  17  ----------------------------<  199<H>  3.9   |  30  |  0.78    Ca    9.3      13 May 2023 01:05  Phos  2.9     05-13  Mg     1.70     05-13    TPro  6.4  /  Alb  2.4<L>  /  TBili  0.4  /  DBili  x   /  AST  16  /  ALT  20  /  AlkPhos  136<H>  05-13    Creatinine Trend: 0.78<--, 0.80<--, 0.72<--, 0.64<--, 0.72<--, 0.67<--      Arterial Blood Gas:  05-13 @ 07:35  7.46/50/96/36/97.9/10.5  ABG lactate: --  Arterial Blood Gas:  05-13 @ 01:05  7.48/48/85/36/96.9/11.0  ABG lactate: --  Arterial Blood Gas:  05-12 @ 20:42  7.50/42/73/33/95.2/8.8  ABG lactate: --  Arterial Blood Gas:  05-12 @ 01:10  7.45/42/58/29/90.2/4.7  ABG lactate: --  Arterial Blood Gas:  05-11 @ 18:00  7.44/44/109/30/98.7/5.2  ABG lactate: --  Arterial Blood Gas:  05-11 @ 11:15  7.43/45/139/30/--/5.0  ABG lactate: --  Arterial Blood Gas:  05-11 @ 09:15  7.45/40/59/28/90.1/3.5  ABG lactate: --        MICROBIOLOGY:     Culture - Urine (collected 11 May 2023 03:00)  Source: Clean Catch Clean Catch (Midstream)  Final Report (12 May 2023 18:46):    No growth      RADIOLOGY:  [ ] Reviewed and interpreted by me

## 2023-05-13 NOTE — PROGRESS NOTE ADULT - ASSESSMENT
This is a 63 y/o F with PMH of DM2, HTN, HLD, obesity, non-obstructive CAD, ALMA on CPAP, and ILD thought secondary to prior Methotrexate use, RA and Psoriatic arthritis who was transferred from [facility] [place] on 5/9 for acute hypoxemic respiratory failure in the setting of recent MRSA bacteremia and suspected ILD exacerbation    Neuro  Alerted and oriented x4 at baseline, hx of pain RRT on 4/21 at [facility] [place] for left facial droop, found to have left sided carotid stenosis only   -currently alert and oriented   - Palliative care was consulted at OSH for multimodal pain management for diffuse arthralgias/myalgias possible 2/2 RA vs fibromyalgia    - c/w Cymbalta, amitriptyline, lidocaine patch, naproxen, tylenol   -PT consult placed   -encourage OOB to chair if tolerated     Pulmonary    Hx of past smoker, ALMA on CPAP, ILD 2/2 ?methotrexate use, pulmonary HTN (remotely on sildenafil stopped ~1 month ago)  who presented to [facility] [place] on 4/10 for acute hypoxemic respiratory failure was on 40L/70% at [facility] [place] alternating with BIPAP 12/5 70%, episodes of severe hypoxia and tachypnea when lying flat (~55-70%) started on NO on 5/10 with improvement.     -c/w NO with goal to increase to 35-40PPM  - Continue HFNC 60L/ titrating fio2 as needed - increase back to 80% and taper as tolerated  - nocturnal Bipap but was able to use overnight d/t NO - no significant hypercapnia on ABG  - Bronchodilators prn   - Continue steroids for ILD currently on solumedrol 40mg Q8h   - Fungitell elevated to 246 - started on PCP treatment with Bactrim - will d/w pharmacy if this can be transitioned to PO to decrease fluid load  - follows Dr. Kim at [facility] who has reportedly been caring for patient  - will eventually obtain CTPA as patient has been hospitalized on and off since early April - presently unable to lie flat therefore continue therapeutic AC if no contraindication    Cardiac   Hx of HTN and nonobstructive CAD, reported diastolic dysfunction, and pulmonary hypertension (last left/right sided cath at [facility] 12/2022)  - continue statin   - c/w metoprolol tartrate  - holding nifedipine given normotension   - official ECHO 5/10 with Normal left ventricular systolic function, right ventricular enlargement with decreased right  ventricular systolic function and severe tricuspid regurgitation. Estimated pulmonary artery systolic pressure equals 96 mm evidence of severe pulmonary hypertension.  - pro-BNP 42010 on 5/10  -c/w aggressive diuresis changed to Bumex 2mg IV Q8 with goal negative 1.5-2.5L - and monitoring of electrolytes    GI  No acute issues  -tolerating diet   - Was reportedly on PPI at home will continue while on steroids   -active BM    /Renal  No SHAAN   -c/w aggressive diuresis with Bumex 2mg IV Q8  -replete electrolytes and trend Q8h while diuresing   -muñiz in place with good UO       Endocrine   DM2 A1c 7.1, hyperglycemia on steroids, reported to have hypercalcemia 2/2 left parathyroid adenoma s/p Alendronate x1   - Continue 5units premeal and SS will increase as needed - Increase Lantus to 14 units  - prednisone 40mg daily changed to solumedrol 40mg Q8h on 5/10 - this will be more than enough for PCP treatment which was started 5/11  - s/p Alendronate x1 with resolved hypercalcemia will continue to monitor    - Thyroid nodule noted on imaging - will need outpatient follow-up    Rheumatology   Hx of RA/psoriatic arthritis diagnosed ~2016, now seronegative, follows with Dr. Rush outpatient, failed multiple modalities (methotrexate, humira, xeljanz and rituximab due to insurance issues) recently on leflunomide.   -OSH records showed patient was negative  Kassi-1, SS-B, SS-A, RNP, SIRISHA, and RF   -repeated RF, SIRISHA, Double stranded DNA, neutrophil cytoplasmic antibody, myomarker panel   - Now with negative RF - per Dr. Rush was previously seropositive    Infectious Disease   MRSA bacteremia at [facility] [place]. Cultures grew MRSA and was initially treated with vancomycin (4/21-4/25 and 5/3-5/8) but continued to have positive blood cultures x5 and subsequently started on Daptomycin on 4/25 with dose increase on 5/1. As per OSH records, the plan was to continue the Daptomycin for a 6 week course and Rifampin (started on 5/1) for a 14 day course.  - continue current antibiotics: Daptomycin (5/1-   ) and empiric Meropenem (5/8-  ) and Bactrim (5/11- )  -CK 22   -f/u repeat blood cultures here   - RVP negative  - LDH elevated to 743   - Fungitell elevated   - start on PCP treatment with Bactrim on 5/11  - ID following      Heme/DVT PPX  Anemia   - Required PRBC transfusion 5/11 - has been stable since then  -c/w full dose lovenox SQ for now - and monitor Hb  -FOBT positive but stool is brown. Will monitor  -Ecchymoses noted over RUE - has good pulses. Will outline and monitor for change.  -No signs of hemolysis  -will f/u iron studies       GOC:   -Full Code - MOLST from [facility] confirmed with patient  -HCP filled out with daughter Joyce Alicea as primary and secondary is her sister Emi Sánchez   -D/w Patient and  at bedside today (5/12) and all questions answered   This is a 63 y/o F with PMH of DM2, HTN, HLD, obesity, non-obstructive CAD, ALMA on CPAP, and ILD thought secondary to prior Methotrexate use, RA and Psoriatic arthritis who was transferred from [facility] [place] on 5/9 for acute hypoxemic respiratory failure in the setting of recent MRSA bacteremia and suspected ILD exacerbation    Neuro  Alerted and oriented x4 at baseline, hx of pain RRT on 4/21 at [facility] for left facial droop, found to have left sided carotid stenosis only   -currently alert and oriented   -Palliative care was consulted at OSH for multimodal pain management for diffuse arthralgias/myalgias possible 2/2 RA vs fibromyalgia    -c/w Cymbalta, amitriptyline, lidocaine patch, naproxen, tylenol   -PT following  -encourage OOB to chair if tolerated     Pulmonary    Hx of past smoker, ALMA on CPAP, ILD 2/2 ?methotrexate use, pulmonary HTN (remotely on sildenafil stopped ~1 month ago)  who presented to [facility] on 4/10 for acute hypoxemic respiratory failure was on 40L/70% at [facility] alternating with BIPAP 12/5 70%, episodes of severe hypoxia and tachypnea when lying flat (~55-70%) started on NO on 5/10 with improvement.     - c/w NO 40PPM  - Continue HFNC 60L/ titrating fio2 as needed - increase back to 80% and taper as tolerated  - nocturnal Bipap but was able to use overnight d/t NO - no significant hypercapnia on ABG  - Bronchodilators prn   - Continue steroids for ILD currently on solumedrol 40mg Q8h   - Fungitell elevated to 246 - started on PCP treatment with Bactrim - transitioned to PO to decrease fluid load  - follows Dr. Kim at [facility] who has reportedly been caring for patient  - will eventually obtain CTPA as patient has been hospitalized on and off since early April - presently unable to lie flat therefore continue therapeutic AC if no contraindication    Cardiac   Hx of HTN and nonobstructive CAD, reported diastolic dysfunction, and pulmonary hypertension (last left/right sided cath at [facility] 12/2022)  - continue statin   - c/w metoprolol tartrate 25mg TID  - holding nifedipine given normotension   - official ECHO 5/10 with Normal LV systolic function, RV enlargement with decreased RV systolic function and severe tricuspid regurgitation. Estimated pulmonary artery systolic pressure equals 96 mm,   evidence of severe pulmonary hypertension.  - pro-BNP 91595 on 5/10  - c/w aggressive diuresis, changed from Bumex 2mg IV Q8 to 1mg q 6 hours with goal of negative 1-2L - and monitoring of electrolytes    GI  No acute issues  -tolerating diet - regular consistent carb  -Was reportedly on PPI at home will continue while on steroids   -active BM    /Renal  Creat on admission 0.5  -now uptrending 0.85, SHAAN likely medication induced (bactrim)  -c/w aggressive diuresis with Bumex 2mg IV Q8  -replete electrolytes and trend Q8h while diuresing   -muñiz in place with good UO       Endocrine   DM2 A1c 7.1, hyperglycemia on steroids, reported to have hypercalcemia 2/2 left parathyroid adenoma s/p Alendronate x1   - Continue 5units premeal and SS will increase as needed - Increase Lantus to 14 units  - prednisone 40mg daily changed to solumedrol 40mg Q8h on 5/10 - this will be more than enough for PCP treatment which was started 5/11  - s/p Alendronate x1 with resolved hypercalcemia will continue to monitor    - Thyroid nodule noted on imaging - will need outpatient follow-up    Rheumatology   Hx of RA/psoriatic arthritis diagnosed ~2016, now seronegative, follows with Dr. Rush outpatient, failed multiple modalities (methotrexate, humira, xeljanz and rituximab due to insurance issues) recently on leflunomide.   -OSH records showed patient was negative  Kassi-1, SS-B, SS-A, RNP, SIRISHA, and RF   -repeated RF, SIRISHA, Double stranded DNA, neutrophil cytoplasmic antibody, myomarker panel   - Now with negative RF - per Dr. Rush was previously seropositive    Infectious Disease   MRSA bacteremia at [facility] [place]. Cultures grew MRSA and was initially treated with vancomycin (4/21-4/25 and 5/3-5/8) but continued to have positive blood cultures x5 and subsequently started on Daptomycin on 4/25 with dose increase on 5/1. As per OSH records, the plan was to continue the Daptomycin for a 6 week course and Rifampin (started on 5/1) for a 14 day course.  - continue current antibiotics: Daptomycin (5/1-   ) and empiric Meropenem (5/8-  ) and Bactrim (5/11- )  -CK 22   -f/u repeat blood cultures here   - RVP negative  - LDH elevated to 743   - Fungitell elevated   - start on PCP treatment with Bactrim on 5/11  - ID following      Heme/DVT PPX  Anemia   - Required PRBC transfusion 5/11 - has been stable since then  -c/w full dose lovenox SQ for now - and monitor Hb  -FOBT positive but stool is brown. Will monitor  -Ecchymoses noted over RUE - has good pulses. Will outline and monitor for change.  -No signs of hemolysis  -will f/u iron studies       GOC:   -Full Code - MOLST from [facility] confirmed with patient  -HCP filled out with daughter Joyce Alicea as primary and secondary is her sister Emi Sánchez   -D/w Patient and  at bedside today (5/12) and all questions answered   This is a 63 y/o F with PMH of DM2, HTN, HLD, obesity, non-obstructive CAD, ALMA on CPAP, and ILD thought secondary to prior Methotrexate use, RA and Psoriatic arthritis who was transferred from [facility] [place] on 5/9 for acute hypoxemic respiratory failure in the setting of recent MRSA bacteremia and suspected ILD exacerbation    Neuro  Alerted and oriented x4 at baseline, hx of pain RRT on 4/21 at [facility] [place] for left facial droop, found to have left sided carotid stenosis only   -currently alert and oriented   -Palliative care was consulted at OSH for multimodal pain management for diffuse arthralgias/myalgias possible 2/2 RA vs fibromyalgia    -c/w Cymbalta, amitriptyline, lidocaine patch, naproxen, tylenol   -PT following  -encourage OOB to chair if tolerated     Pulmonary    Hx of past smoker, ALMA on CPAP, ILD 2/2 ?methotrexate use, pulmonary HTN (remotely on sildenafil stopped ~1 month ago)  who presented to [facility] [place] on 4/10 for acute hypoxemic respiratory failure was on 40L/70% at [facility] [place] alternating with BIPAP 12/5 70%, episodes of severe hypoxia and tachypnea when lying flat (~55-70%) started on NO on 5/10 with improvement.     - c/w NO 40PPM  - Continue HFNC 60L/ titrating fio2 as needed - increase back to 80% and taper as tolerated  - nocturnal Bipap but was able to use overnight d/t NO - no significant hypercapnia on ABG  - Bronchodilators prn   - Continue steroids for ILD currently on solumedrol 40mg Q8h   - Fungitell elevated to 246 - started on PCP treatment with Bactrim - transitioned to PO to decrease fluid load  - follows Dr. Kim at [facility] who has reportedly been caring for patient  - will eventually obtain CTPA as patient has been hospitalized on and off since early April - presently unable to lie flat therefore continue therapeutic AC if no contraindication    Cardiac   Hx of HTN and nonobstructive CAD, reported diastolic dysfunction, and pulmonary hypertension (last left/right sided cath at [facility] 12/2022)  - continue statin   - c/w metoprolol tartrate 25mg TID  - holding nifedipine given normotension   - official ECHO 5/10 with Normal LV systolic function, RV enlargement with decreased RV systolic function and severe tricuspid regurgitation. Estimated pulmonary artery systolic pressure equals 96 mm,   evidence of severe pulmonary hypertension.  - pro-BNP 39254 on 5/10  - c/w aggressive diuresis, changed from Bumex 2mg IV Q8 to 1mg q 6 hours with goal of negative 1-2L - and monitoring of electrolytes    GI  No acute issues  -tolerating diet - regular consistent carb  -Was reportedly on PPI at home will continue while on steroids   -active BM    /Renal  Creat on admission 0.5  -now uptrending 0.85, SHAAN likely medication induced (bactrim)  -c/w aggressive diuresis with Bumex 2mg IV Q8  -replete electrolytes and trend Q8h while diuresing   -muñiz in place with good UO       Endocrine   DM2 A1c 7.1, hyperglycemia on steroids, reported to have hypercalcemia 2/2 left parathyroid adenoma s/p Alendronate x1   - continue sliding scale and increase premeal 5 units to 7 units, increase as needed - continue Lantus to 14 units  - prednisone 40mg daily changed to solumedrol 40mg Q8h on 5/10 - this will be more than enough for PCP treatment which was started 5/11  - s/p Alendronate x1 with resolved hypercalcemia will continue to monitor    - Thyroid nodule noted on imaging - will need outpatient follow-up    Rheumatology   Hx of RA/psoriatic arthritis diagnosed ~2016, now seronegative, follows with Dr. Rush outpatient, failed multiple modalities (methotrexate, humira, xeljanz and rituximab due to insurance issues) recently on leflunomide.   -OSH records showed patient was negative  Kassi-1, SS-B, SS-A, RNP, SIRISHA, and RF   -repeated RF, SIRISHA, Double stranded DNA, neutrophil cytoplasmic antibody, myomarker panel   - Now with negative RF - per Dr. Rush was previously seropositive    Infectious Disease   MRSA bacteremia at [facility] [place]. Cultures grew MRSA and was initially treated with vancomycin (4/21-4/25 and 5/3-5/8) but continued to have positive blood cultures x5 and subsequently started on Daptomycin on 4/25 with dose increase on 5/1. As per OSH records, the plan was to continue the Daptomycin for a 6 week course and Rifampin (started on 5/1) for a 14 day course.  - continue current antibiotics: Daptomycin (5/1-   ) and empiric Meropenem (5/8-  ) and Bactrim (5/11- )  -CK 22   -f/u repeat blood cultures here   - RVP negative  - LDH elevated to 743   - Fungitell elevated   - start on PCP treatment with Bactrim on 5/11  - ID following      Heme/DVT PPX  Anemia   - Required PRBC transfusion 5/11 - has been stable since then  -c/w full dose lovenox SQ for now - and monitor Hb  -FOBT positive but stool is brown. Will monitor  -Ecchymoses noted over RUE - has good pulses. Will outline and monitor for change.  -No signs of hemolysis  -will f/u iron studies       GOC:   -Full Code - MOLST from [facility] confirmed with patient  -HCP filled out with daughter Joyce Alicea as primary and secondary is her sister Emi Sánchez   -D/w Patient and  at bedside today (5/12) and all questions answered   This is a 61 y/o F with PMH of DM2, HTN, HLD, obesity, non-obstructive CAD, ALMA on CPAP, and ILD thought secondary to prior Methotrexate use, RA and Psoriatic arthritis who was [place] from [facility] [place]9 for acute hypoxemic respiratory failure in the setting of recent MRSA bacteremia and suspected ILD exacerbation    Neuro  Alerted and oriented x4 at baseline, hx of pain RRT on 4/21 at [facility] [place] for left facial droop, found to have left sided carotid stenosis only   -currently alert and oriented   -Palliative care was consulted at OSH for multimodal pain management for diffuse arthralgias/myalgias possible 2/2 RA vs fibromyalgia    -c/w Cymbalta, amitriptyline, lidocaine patch, naproxen, tylenol   -PT following  -encourage OOB to chair if tolerated     Pulmonary    Hx of past smoker, ALMA on CPAP, ILD 2/2 ?methotrexate use, pulmonary HTN (remotely on sildenafil stopped ~1 month ago)  who presented to [facility] on 4/10 for acute hypoxemic respiratory failure was on 40L/70% at [facility] [place] alternating with BIPAP 12/5 70%, episodes of severe hypoxia and tachypnea when lying flat (~55-70%) started on NO on 5/10 with improvement.     - c/w NO 40PPM  - Continue HFNC 60L/ titrating fio2 as needed - increase back to 80% and taper as tolerated  - sildenafil 10mg PO q8 hours  - methemoglobin today 1.1, continue to trend as needed  - nocturnal Bipap but was able to use overnight d/t NO - no significant hypercapnia on ABG  - Bronchodilators prn   - Continue steroids for ILD currently on solumedrol 40mg Q8h   - Fungitell elevated to 246 - started on PCP treatment with Bactrim - transitioned to PO to decrease fluid load  - follows Dr. Kim at [facility] who has reportedly been caring for patient  - will eventually obtain CTPA as patient has been hospitalized on and off since early April - presently unable to lie flat therefore continue therapeutic AC if no contraindication    Cardiac   Hx of HTN and nonobstructive CAD, reported diastolic dysfunction, and pulmonary hypertension (last left/right sided cath at [facility] 12/2022)  - continue statin   - c/w metoprolol tartrate 25mg TID  - holding nifedipine given normotension   - official ECHO 5/10 with Normal LV systolic function, RV enlargement with decreased RV systolic function and severe tricuspid regurgitation. Estimated pulmonary artery systolic pressure equals 96 mm,   evidence of severe pulmonary hypertension.  - pro-BNP 07134 on 5/10  - c/w aggressive diuresis, changed from Bumex 2mg IV Q8 to 1mg q 6 hours with goal of negative 1-2L - and monitoring of electrolytes    GI  No acute issues  -tolerating diet - regular consistent carb  -Was reportedly on PPI at home will continue while on steroids   -active BM    /Renal  Creat on admission 0.5  -now uptrending 0.85, SHAAN likely medication induced (bactrim)  -continue diuresis with Bumex however can decrease from 2mg IV Q8 to 1mg q6 hours as lactate uptrends  -replete electrolytes and trend Q8h while diuresing   -muñiz in place with good UO       Endocrine   DM2 A1c 7.1, hyperglycemia on steroids, reported to have hypercalcemia 2/2 left parathyroid adenoma s/p Alendronate x1   - continue sliding scale and increase premeal 5 units to 7 units, increase as needed - continue Lantus to 14 units  - prednisone 40mg daily changed to solumedrol 40mg Q8h on 5/10 - this will be more than enough for PCP treatment which was started 5/11  - s/p Alendronate x1 with resolved hypercalcemia will continue to monitor    - Thyroid nodule noted on imaging - will need outpatient follow-up    Rheumatology   Hx of RA/psoriatic arthritis diagnosed ~2016, now seronegative, follows with Dr. Rush outpatient, failed multiple modalities (methotrexate, humira, xeljanz and rituximab due to insurance issues) recently on leflunomide.   -OSH records showed patient was negative  Kassi-1, SS-B, SS-A, RNP, SIRISHA, and RF   -repeated RF, SIRISHA, Double stranded DNA, neutrophil cytoplasmic antibody, myomarker panel   -Now with negative RF - per Dr. Rush was previously seropositive    Infectious Disease   MRSA bacteremia at [facility]. Cultures grew MRSA and was initially treated with vancomycin (4/21-4/25 and 5/3-5/8) but continued to have positive blood cultures x5 and subsequently started on Daptomycin on 4/25 with dose increase on 5/1. As per OSH records, the plan was to continue the Daptomycin for a 6 week course and Rifampin (started on 5/1) for a 14 day course.  - continue current antibiotics: Daptomycin (5/1-   ) and empiric Meropenem (5/8-  ) and Bactrim (5/11- )  - CK 22   - f/u repeat blood cultures here   - RVP negative  - LDH elevated to 743   - c/w treatment dose Bactrim 5/11 given increased LDH and Fungitell. Pending sputum PCP PRC if able.  - ID following      Heme/DVT PPX  Anemia   - Required PRBC transfusion 5/11 - has been stable since then  -c/w full dose lovenox SQ for now - and monitor Hb  -FOBT positive but stool is brown. Will monitor  -Ecchymoses noted over RUE - has good pulses. Will outline and monitor for change.  -No signs of hemolysis  -will f/u iron studies       GOC:   -Full Code - MOLST from [facility] confirmed with patient  -HCP filled out with daughter Joyce Alicea as primary and secondary is her sister Emi Sánchez   -D/w Patient and  at bedside today (5/12) and all questions answered

## 2023-05-14 NOTE — PROGRESS NOTE ADULT - ATTENDING COMMENTS
Patient is a 62F with extensive history including DM2, HTN, HLD, obesity, non-obstructive CAD, ALMA on CPAP, and ILD (NSIP) thought secondary to prior Methotrexate use, RA and Psoriatic arthritis [place] [facility] [place] in the setting of recent MRSA bacteremia and suspected ILD exacerbation.    Patient with persistent hypoxemic respiratory failure and desaturation with minimal exertion. Additional information obtained from [facility]. Patient was taken off Sildenafil because thought was primarily related to Group II. Patient was on Torsemide. She also has CT scan with notable mosaic attenuation on the L lung.     #Pulmonary - acute hypoxemic respiratory failure  #pHTN - Patient has been seen by Dr. Keshia Kim at [facility] a few times since 1/2023.  - [place] [facility] [place] 4/24/23 - reviewed with evidence of ILD and mosaic attenuation on the L  - Continue HFNC 60L/80% and wean as tolerated. Will maintain Angie at 40 ppm. SIldenafil 10mg TID added 5/12, will increase to 20mg TID  - Maintain O2 sat > 90% as able  - c/w Bronchodilators  - c/w steroids for ILD   - c/w treatment dose Bactrim given increased LDH and Fungitell. Pending sputum PCP PRC if able.  - Pulmonary hypertension - repeat echocardiogram noted - has been off Sildenafil > 1 month. Symptomatically improved on Angie but continues to have high O2 requirements. Will ultimately require RHC once volume status improved  - Diuresis to keep net negative. Will c/w Bumex 1mg q6h  - Encourage OOB and PT, can increase FiO2 as needed. Incentive spirometery  - If no clear explanation for hypoxemia will need to consider CTPA as patient has been hospitalized on and off since early April - however at this point unable to lay flat for CTPA. Therefore started on therapeutic AC (close monitoring of Hb which was lower this AM but with reported brown stools)    #Infectious Disease - patient with reported MRSA bacteremia  - ID evaluation. Continue Dapto, plan for 6 week total course. Rifampin stopped. CK normal. Will stop empiric Frankie.  - [place] [facility] [place] had persistent bacteremia which cleared prior to transfer (5/4)  - Repeat cultures here thus far negative  - RVP negative  - LDH and Fungitell elevated - started on treatment dose Bactrim 5/11. PO utilized to decrease fluid burden.. May need to change to Mepron if creatinine continues to uptrend    #SHAAN - Prerenal in setting of diuresis vs Bactrim  - Check urine lytes  - MOnitor BMP and UOP    #Cardiac - nonobstructive CAD, reported diastolic dysfunction, and pulmonary hypertension  - echocardiogram reviewed  - Prior increase in PCWP on cath with Angie may suggest a more significant diastolic dysfunction than known  - aggressive diuresis with goal net negative    #Endocrine  - c/w basal bolus insulin  - Check FS and sliding scale  - Diabetic diet    #Gastro - patient tolerating PO  - PPI as was on this at home  - Bowel regimen    #DVT proph - continue therapeutic AC  - Close monitoring of Hb and transfuse as needed.   - FOBT positive but stools brown. If continued drop in Hb or no appropriate response to transfusion may need to D/C AC  - Monitor ecchymosis on arm    Patient is at high risk for clinical deterioration. Patient is critically ill. Frequent bedside visits through the day.    Jack Jarquin MD  Pulmonary & Critical Care

## 2023-05-14 NOTE — PROGRESS NOTE ADULT - ASSESSMENT
This is a 63 y/o F with PMH of DM2, HTN, HLD, obesity, non-obstructive CAD, ALMA on CPAP, and ILD thought secondary to prior Methotrexate use, RA and Psoriatic arthritis who [place] [facility] [place] acute hypoxemic respiratory failure in the setting of recent MRSA bacteremia and suspected ILD exacerbation    Neuro  Alerted and oriented x4 at baseline, hx of pain RRT on [place] [facility] [place] droop, found to have left sided carotid stenosis only   -currently alert and oriented   -Palliative care was consulted at OSH for multimodal pain management for diffuse arthralgias/myalgias possible 2/2 RA vs fibromyalgia    -c/w Cymbalta, amitriptyline, lidocaine patch, naproxen, tylenol   -PT following  -encourage OOB to chair if tolerated     Pulmonary    Hx of past smoker, ALMA on CPAP, ILD 2/2 ?methotrexate use, pulmonary HTN (remotely on sildenafil stopped ~1 month ago)  who [place] [facility] [place] acute hypoxemic respiratory failure was on 40L/70% [place] [facility] [place] with BIPAP 12/5 70%, episodes of severe hypoxia and tachypnea when lying flat (~55-70%) started on NO on 5/10 with improvement.     - c/w NO 40PPM  - Continue HFNC 60L/ titrating fio2 as needed - increase back to 80% and taper as tolerated  - sildenafil 10mg PO q8 hours  - methemoglobin today 1.1, continue to trend as needed  - nocturnal Bipap but was able to use overnight d/t NO - no significant hypercapnia on ABG  - Bronchodilators prn   - Continue steroids for ILD currently on solumedrol 40mg Q8h   - Fungitell elevated to 246 - started on PCP treatment with Bactrim - transitioned to PO to decrease fluid load  - follows Dr. Kim at [facility] who has reportedly been caring for patient  - will eventually obtain CTPA as patient has been hospitalized on and off since early April - presently unable to lie flat therefore continue therapeutic AC if no contraindication    Cardiac   Hx of HTN and nonobstructive CAD, reported diastolic dysfunction, and pulmonary hypertension (last left/right sided cath at [facility] 12/2022)  - continue statin   - c/w metoprolol tartrate 25mg TID  - holding nifedipine given normotension   - official ECHO 5/10 with Normal LV systolic function, RV enlargement with decreased RV systolic function and severe tricuspid regurgitation. Estimated pulmonary artery systolic pressure equals 96 mm,   evidence of severe pulmonary hypertension.  - pro-BNP 74766 on 5/10  - c/w aggressive diuresis, changed from Bumex 2mg IV Q8 to 1mg q 6 hours with goal of negative 1-2L - and monitoring of electrolytes    GI  No acute issues  -tolerating diet - regular consistent carb  -Was reportedly on PPI at home will continue while on steroids   -active BM    /Renal  Creat on admission 0.5  -now uptrending 0.85, SHAAN likely medication induced (bactrim)  -continue diuresis with Bumex however can decrease from 2mg IV Q8 to 1mg q6 hours as lactate uptrends  -replete electrolytes and trend Q8h while diuresing   -muñiz in place with good UO       Endocrine   DM2 A1c 7.1, hyperglycemia on steroids, reported to have hypercalcemia 2/2 left parathyroid adenoma s/p Alendronate x1   - continue sliding scale and increase premeal 5 units to 7 units, increase as needed - continue Lantus to 14 units  - prednisone 40mg daily changed to solumedrol 40mg Q8h on 5/10 - this will be more than enough for PCP treatment which was started 5/11  - s/p Alendronate x1 with resolved hypercalcemia will continue to monitor    - Thyroid nodule noted on imaging - will need outpatient follow-up    Rheumatology   Hx of RA/psoriatic arthritis diagnosed ~2016, now seronegative, follows with Dr. Rush outpatient, failed multiple modalities (methotrexate, humira, xeljanz and rituximab due to insurance issues) recently on leflunomide.   -OSH records showed patient was negative  Kassi-1, SS-B, SS-A, RNP, SIRISHA, and RF   -repeated RF, SIRISHA, Double stranded DNA, neutrophil cytoplasmic antibody, myomarker panel   -Now with negative RF - per Dr. Rush was previously seropositive    Infectious Disease   [place] [facility] Cultures grew MRSA and was initially treated with vancomycin (4/21-4/25 and 5/3-5/8) but continued to have positive blood cultures x5 and subsequently started on Daptomycin on 4/25 with dose increase on 5/1. As per OSH records, the plan was to continue the Daptomycin for a 6 week course and Rifampin (started on 5/1) for a 14 day course.  - continue current antibiotics: Daptomycin (5/1-   ) and empiric Meropenem (5/8-  ) and Bactrim (5/11- )  - CK 22   - f/u repeat blood cultures here   - RVP negative  - LDH elevated to 743   - c/w treatment dose Bactrim 5/11 given increased LDH and Fungitell. Pending sputum PCP PRC if able.  - ID following      Heme/DVT PPX  Anemia   - Required PRBC transfusion 5/11 - has been stable since then  -c/w full dose lovenox SQ for now - and monitor Hb  -FOBT positive but stool is brown. Will monitor  -Ecchymoses noted over RUE - has good pulses. Will outline and monitor for change.  -No signs of hemolysis  -will f/u iron studies       GOC:   -Full Code - MOLST from [facility] confirmed with patient  -HCP filled out with daughter Joyce Alicea as primary and secondary is her sister Emi Sánchez   -D/w Patient and  at bedside today (5/12) and all questions answered     This is a 61 y/o F with PMH of DM2, HTN, HLD, obesity, non-obstructive CAD, ALMA on CPAP, and ILD thought secondary to prior Methotrexate use, RA and Psoriatic arthritis who was transferred from [facility] [place] on 5/9 for acute hypoxemic respiratory failure in the setting of recent MRSA bacteremia and suspected ILD exacerbation    Neuro  Alerted and oriented x4 at baseline, hx of pain RRT on 4/21 at [facility] [place] for left facial droop, found to have left sided carotid stenosis only   -currently alert and oriented   -Palliative care was consulted at OSH for multimodal pain management for diffuse arthralgias/myalgias possible 2/2 RA vs fibromyalgia    -c/w Cymbalta, amitriptyline, lidocaine patch, naproxen, tylenol   -PT following  -encourage OOB to chair if tolerated     Pulmonary    Hx of past smoker, ALMA on CPAP, ILD 2/2 ?methotrexate use, pulmonary HTN (remotely on sildenafil stopped ~1 month ago)  who presented to [facility] [place] on 4/10 for acute hypoxemic respiratory failure was on 40L/70% at [facility] [place] alternating with BIPAP 12/5 70%, episodes of severe hypoxia and tachypnea when lying flat (~55-70%) started on NO on 5/10 with improvement.     - c/w NO 40PPM  - Continue HFNC 60L/ titrating fio2 as needed - increase back to 80% and taper as tolerated  - sildenafil 10mg PO q8 hours, may need to increase if no improvement  - methemoglobin today 0.7, continue to trend as needed  - nocturnal Bipap but was able to use overnight d/t NO - no significant hypercapnia on ABG  - Bronchodilators prn   - Continue steroids for ILD currently on solumedrol 40mg Q8h   - Fungitell elevated to 246 - started on PCP treatment with Bactrim - transitioned to PO to decrease fluid load  - follows Dr. Kim at [facility] who has reportedly been caring for patient  - will eventually obtain CTPA as patient has been hospitalized on and off since early April - presently unable to lie flat therefore continue therapeutic AC if no contraindication    Cardiac   Hx of HTN and nonobstructive CAD, reported diastolic dysfunction, and pulmonary hypertension (last left/right sided cath at [facility] 12/2022)  - continue statin   - c/w metoprolol tartrate 25mg TID  - holding nifedipine given normotension   - official ECHO 5/10 with Normal LV systolic function, RV enlargement with decreased RV systolic function and severe tricuspid regurgitation. Estimated pulmonary artery systolic pressure equals 96 mm,   evidence of severe pulmonary hypertension.  - pro-BNP 56784 on 5/10  - c/w aggressive diuresis, changed from Bumex 2mg IV Q8 to 1mg q 6 hours with goal of negative 1-2L - and monitoring of electrolytes    GI  No acute issues  -tolerating diet - regular consistent carb  -Was reportedly on PPI at home will continue while on steroids   -active BM    /Renal  Creat on admission 0.5  -now uptrending 0.85, SHAAN likely medication induced (bactrim)  -continue diuresis with Bumex however can decrease from 2mg IV Q8 to 1mg q6 hours as lactate uptrends  -replete electrolytes and trend Q8h while diuresing   -muñiz in place with good UO       Endocrine   DM2 A1c 7.1, hyperglycemia on steroids, reported to have hypercalcemia 2/2 left parathyroid adenoma s/p Alendronate x1   - continue sliding scale and increase premeal 5 units to 7 units, increase as needed - continue Lantus to 14 units  - prednisone 40mg daily changed to solumedrol 40mg Q8h on 5/10 - this will be more than enough for PCP treatment which was started 5/11  - s/p Alendronate x1 with resolved hypercalcemia will continue to monitor    - Thyroid nodule noted on imaging - will need outpatient follow-up    Rheumatology   Hx of RA/psoriatic arthritis diagnosed ~2016, now seronegative, follows with Dr. Rush outpatient, failed multiple modalities (methotrexate, humira, xeljanz and rituximab due to insurance issues) recently on leflunomide.   -OSH records showed patient was negative  Kassi-1, SS-B, SS-A, RNP, SIRISHA, and RF   -repeated RF, SIRISHA, Double stranded DNA, neutrophil cytoplasmic antibody, myomarker panel   -Now with negative RF - per Dr. Rush was previously seropositive    Infectious Disease   MRSA bacteremia at [facility] [place]. Cultures grew MRSA and was initially treated with vancomycin (4/21-4/25 and 5/3-5/8) but continued to have positive blood cultures x5 and subsequently started on Daptomycin on 4/25 with dose increase on 5/1. As per OSH records, the plan was to continue the Daptomycin for a 6 week course and Rifampin (started on 5/1) for a 14 day course.  - continue current antibiotics: Daptomycin (5/1-   ) and empiric Meropenem (5/8-  ) and Bactrim (5/11- )  - CK 22   - f/u repeat blood cultures here   - RVP negative  - LDH elevated to 743   - c/w treatment dose Bactrim 5/11 given increased LDH and Fungitell. Pending sputum PCP PRC if able.  - ID following      Heme/DVT PPX  Anemia   - Required PRBC transfusion 5/11 - has been stable since then  -c/w full dose lovenox SQ for now - and monitor Hb  -FOBT positive but stool is brown. Will monitor  -Ecchymoses noted over RUE - has good pulses. Will outline and monitor for change.  -No signs of hemolysis  -will f/u iron studies       GOC:   -Full Code - MOLST from [facility] confirmed with patient  -HCP filled out with daughter Jyoce Alicea as primary and secondary is her sister Emi Sánchez   -D/w Patient and  at bedside today (5/12) and all questions answered     This is a 63 y/o F with PMH of DM2, HTN, HLD, obesity, non-obstructive CAD, ALMA on CPAP, and ILD thought secondary to prior Methotrexate use, RA and Psoriatic arthritis who was transferred from [facility] on 5/9 for acute hypoxemic respiratory failure in the setting of recent MRSA bacteremia and suspected ILD exacerbation    Neuro  Alerted and oriented x4 at baseline, hx of pain RRT on 4/21 at [facility] for left facial droop, found to have left sided carotid stenosis only   -currently alert and oriented   -Palliative care was consulted at OSH for multimodal pain management for diffuse arthralgias/myalgias possible 2/2 RA vs fibromyalgia    -c/w Cymbalta, amitriptyline, lidocaine patch, naproxen, tylenol   -PT following  -encourage OOB to chair if tolerated     Pulmonary    Hx of past smoker, ALMA on CPAP, ILD 2/2 ?methotrexate use, pulmonary HTN (remotely on sildenafil stopped ~1 month ago)  who presented to [facility] on 4/10 for acute hypoxemic respiratory failure was on 40L/70% at [facility] alternating with BIPAP 12/5 70%, episodes of severe hypoxia and tachypnea when lying flat (~55-70%) started on NO on 5/10 with improvement.     - c/w NO 40PPM  - Continue HFNC 60L/ titrating fio2 as needed - increase back to 80% and taper as tolerated  - sildenafil 10mg PO q8 hours, may need to increase if no improvement  - methemoglobin today 0.7, continue to trend as needed  - nocturnal Bipap but was able to use overnight d/t NO - no significant hypercapnia on ABG  - Bronchodilators prn   - Continue steroids for ILD currently on solumedrol 40mg Q8h   - Fungitell elevated to 246 - started on PCP treatment with Bactrim - transitioned to PO to decrease fluid load  - follows Dr. Kim at [facility] who has reportedly been caring for patient  - will eventually obtain CTPA as patient has been hospitalized on and off since early April - presently unable to lie flat therefore continue therapeutic AC if no contraindication    Cardiac   Hx of HTN and nonobstructive CAD, reported diastolic dysfunction, and pulmonary hypertension (last left/right sided cath at [facility] 12/2022)  - continue statin   - c/w metoprolol tartrate 25mg TID  - holding nifedipine given normotension   - official ECHO 5/10 with Normal LV systolic function, RV enlargement with decreased RV systolic function and severe tricuspid regurgitation. Estimated pulmonary artery systolic pressure equals 96 mm,   evidence of severe pulmonary hypertension.  - pro-BNP 71671 on 5/10  - c/w aggressive diuresis, changed from Bumex 2mg IV Q8 to 1mg q 6 hours with goal of negative 1-2L - and monitoring of electrolytes    GI  No acute issues  -tolerating diet - regular consistent carb  -Was reportedly on PPI at home will continue while on steroids   -active BM    /Renal  Creat on admission 0.5  -now uptrending 0.85, SHAAN likely medication induced (bactrim)  -continue diuresis with Bumex however can decrease from 2mg IV Q8 to 1mg q6 hours as lactate uptrends  -replete electrolytes and trend Q8h while diuresing   -muñiz in place with good UO       Endocrine   DM2 A1c 7.1, hyperglycemia on steroids, reported to have hypercalcemia 2/2 left parathyroid adenoma s/p Alendronate x1   - continue sliding scale and increase premeal 5 units to 7 units, increase as needed - continue Lantus to 14 units  - prednisone 40mg daily changed to solumedrol 40mg Q8h on 5/10 - this will be more than enough for PCP treatment which was started 5/11  - s/p Alendronate x1 with resolved hypercalcemia will continue to monitor    - Thyroid nodule noted on imaging - will need outpatient follow-up    Rheumatology   Hx of RA/psoriatic arthritis diagnosed ~2016, now seronegative, follows with Dr. Rush outpatient, failed multiple modalities (methotrexate, humira, xeljanz and rituximab due to insurance issues) recently on leflunomide.   -OSH records showed patient was negative  Kassi-1, SS-B, SS-A, RNP, SIRISHA, and RF   -repeated RF, SIRISHA, Double stranded DNA, neutrophil cytoplasmic antibody, myomarker panel   -Now with negative RF - per Dr. Rush was previously seropositive    Infectious Disease   MRSA bacteremia at [facility]. Cultures grew MRSA and was initially treated with vancomycin (4/21-4/25 and 5/3-5/8) but continued to have positive blood cultures x5 and subsequently started on Daptomycin on 4/25 with dose increase on 5/1. As per OSH records, the plan was to continue the Daptomycin for a 6 week course and Rifampin (started on 5/1) for a 14 day course.  - continue current antibiotics: 6 week course of Daptomycin for MRSA bactermia (5/1-   ) and Bactrim PCP (5/11- )  given increased LDH and Fungitell  - s/p empiric Meropenem (5/8-5/14 )  - CK 22   - f/u repeat blood cultures here   - RVP negative  - LDH elevated to 743   - Pending sputum PCP PRC if able.  - ID following      Heme/DVT PPX  Anemia   - Required PRBC transfusion 5/11 - has been stable since then  -c/w full dose lovenox SQ for now - and monitor Hb  -FOBT positive but stool is brown. Will monitor  -Ecchymoses noted over RUE - has good pulses. Will outline and monitor for change.  -No signs of hemolysis  -will f/u iron studies       GOC:   -Full Code - MOLST from [facility] confirmed with patient  -HCP filled out with daughter Joyce Alicea as primary and secondary is her sister Emi Sánchez   -D/w Patient and  at bedside today (5/12) and all questions answered     This is a 61 y/o F with PMH of DM2, HTN, HLD, obesity, non-obstructive CAD, ALMA on CPAP, and ILD thought secondary to prior Methotrexate use, RA and Psoriatic arthritis who was transferred from [facility] on 5[place]9 for acute hypoxemic respiratory failure in the setting of recent MRSA bacteremia and suspected ILD exacerbation    Neuro  Alerted and oriented x4 at baseline, hx of pain RRT on 4/21 at [facility] for left facial droop, found to have left sided carotid stenosis only   -currently alert and oriented   -Palliative care was consulted at OSH for multimodal pain management for diffuse arthralgias/myalgias possible 2/2 RA vs fibromyalgia    -c/w Cymbalta, amitriptyline, lidocaine patch, naproxen, tylenol   -PT following  -encourage OOB to chair if tolerated     Pulmonary    Hx of past smoker, ALMA on CPAP, ILD 2/2 ?methotrexate use, pulmonary HTN (remotely on sildenafil stopped ~1 month ago)  who presented to [facility] on 4/10 for acute hypoxemic respiratory failure was on 40L/70% at [facility] alternating with BIPAP 12/5 70%, episodes of severe hypoxia and tachypnea when lying flat (~55-70%) started on NO on 5/10 with improvement.     - c/w NO 40PPM  - Continue HFNC 60L/ titrating fio2 as needed - increase back to 80% and taper as tolerated  - sildenafil increased from 10mg PO q8 hours to 20mg TID, may need to increase if no improvement  - methemoglobin today 0.7, continue to trend as needed  - nocturnal Bipap but was able to use overnight d/t NO - no significant hypercapnia on ABG  - Bronchodilators prn   - Continue steroids for ILD currently on solumedrol 40mg Q8h   - Fungitell elevated to 246 - started on PCP treatment with Bactrim - transitioned to PO to decrease fluid load  - follows Dr. Kim at [facility] who has reportedly been caring for patient  - will eventually obtain CTPA as patient has been hospitalized on and off since early April - presently unable to lie flat therefore continue therapeutic AC if no contraindication    Cardiac   Hx of HTN and nonobstructive CAD, reported diastolic dysfunction, and pulmonary hypertension (last left/right sided cath at [facility] 12/2022)  - continue statin   - c/w metoprolol tartrate 25mg TID  - holding nifedipine given normotension   - official ECHO 5/10 with Normal LV systolic function, RV enlargement with decreased RV systolic function and severe tricuspid regurgitation. Estimated pulmonary artery systolic pressure equals 96 mm,   evidence of severe pulmonary hypertension.  - pro-BNP 12406 on 5/10  - c/w aggressive diuresis with Bumex and continue to monitor electrolytes    GI  No acute issues  -tolerating diet - regular consistent carb  -Was reportedly on PPI at home will continue while on steroids   -active BM    /Renal  Creat on admission 0.5  -now uptrending 0.85, SHAAN likely medication induced (bactrim)  -continue diuresis with Bumex however can decrease from 2mg IV Q8 to 1mg q6 hours as lactate uptrends,  goal keep net negative 1-2 liters   -replete electrolytes and trend Q8h while diuresing   -muñiz in place with good UO       Endocrine   DM2 A1c 7.1, hyperglycemia on steroids, reported to have hypercalcemia 2/2 left parathyroid adenoma s/p Alendronate x1   - continue sliding scale and increase premeal 5 units to 7 units, increase as needed - continue Lantus to 14 units  - prednisone 40mg daily changed to solumedrol 40mg Q8h on 5/10 - this will be more than enough for PCP treatment which was started 5/11  - s/p Alendronate x1 with resolved hypercalcemia will continue to monitor    - Thyroid nodule noted on imaging - will need outpatient follow-up    Rheumatology   Hx of RA/psoriatic arthritis diagnosed ~2016, now seronegative, follows with Dr. Rush outpatient, failed multiple modalities (methotrexate, humira, xeljanz and rituximab due to insurance issues) recently on leflunomide.   -OSH records showed patient was negative  Kassi-1, SS-B, SS-A, RNP, SIRISHA, and RF   -repeated RF, SIRISAH, Double stranded DNA, neutrophil cytoplasmic antibody, myomarker panel   -Now with negative RF - per Dr. Rush was previously seropositive    Infectious Disease   MRSA bacteremia at [facility]. Cultures grew MRSA and was initially treated with vancomycin (4/21-4/25 and 5/3-5/8) but continued to have positive blood cultures x5 and subsequently started on Daptomycin on 4/25 with dose increase on 5/1. As per OSH records, the plan was to continue the Daptomycin for a 6 week course and Rifampin (started on 5/1) for a 14 day course.  - continue current antibiotics: 6 week course of Daptomycin for MRSA bactermia (5/1-   ) and Bactrim PCP (5/11- )  given increased LDH and Fungitell  - s/p empiric Meropenem (5/8-5/14 )  - CK 22   - f/u repeat blood cultures here   - RVP negative  - LDH elevated to 743   - Pending sputum PCP PRC if able.  - ID following      Heme/DVT PPX  Anemia   - Required PRBC transfusion 5/11 - has been stable since then  -c/w full dose lovenox SQ for now - and monitor Hb  -FOBT positive but stool is brown. Will monitor  -Ecchymoses noted over RUE - has good pulses. Will outline and monitor for change.  -No signs of hemolysis  -will f/u iron studies       GOC:   -Full Code - MOLST from [facility] confirmed with patient  -HCP filled out with daughter Joyce Alicea as primary and secondary is her sister Emi Sánchez   -D/w Patient and  at bedside today (5/12) and all questions answered

## 2023-05-14 NOTE — PROGRESS NOTE ADULT - SUBJECTIVE AND OBJECTIVE BOX
CHIEF COMPLAINT:    Interval Events:    HPI:  62 year old female with a PMHx of IDL on 3.5L home O2, ALMA on CPAP and chronic prednisone, pulmonary HTN, T2DM, RA, psoriatic arthritis who initially present to [facility] for worsening dyspnea/ hypoxia requiring HFCA and BIPAP and was transferred to [facility] further management She has had multiple recent hospitalizations for acute on chronic hypoxic respiratory failure 2/2 IDL flair/ PNA. During her hospitalization she had a rapid response called (on 4/21) for left facial droop and AMS c/f possible stroke. Imagining at the time demonstrated high grade L sided carotid stenosis for which pt and family decided not to pursue surgical intervention.   Pt was then found to be febrile and tachycardic, and was transferred to ICU for sepsis with multifactorial encephalopathy and AHRF. Pts blood cultures grew MRSA and she was initially treated with vancomycin (4/21-4/25 and 5/3-5/8) but continued to have positive blood cultures x5. She was also started on Merrem (4/21-4/23) and subsequently started on Daptomycin on 4/25 with dose increase on 5/1. Plan was to continue the Daptomycin for a 6 week course and Rifampin (started on 5/1) for a 14 day course. Of note, pt had negative blood cx x2 on 5/4/23. Pt was found to be febrile again on 5/8 with a rectal temp of 100.7. Fungitell was ordered and pt was started on Meropenem. Possible suspected source was superficial thrombophlebitis (seen on U/S in the left cephalic vein). ID recommended NM whole body scan/ indium scan and ELICEO to r/o other source of persistent bacteremia, however pt was unable to lie flat given her respiratory status and these studies were not pursued (At the time of transfer, pt remained on dapto, abeba, and rifampine)  Pt hospital course was further complicated by hypercalcemia likely PTH-mediated ISO a L parathyroid adenoma. She was started on IVF and given Alendronate x1 with improvement. Palliative care was also consulted for multimodal pain management for diffuse arthralgias/myalgias possible 2/2 RA vs fibromyalgia     (09 May 2023 23:13)      REVIEW OF SYSTEMS:  Constitutional: [ ] negative [ ] fevers [ ] chills [ ] weight loss [ ] weight gain  HEENT: [ ] negative [ ] dry eyes [ ] eye irritation [ ] postnasal drip [ ] nasal congestion  CV: [ ] negative  [ ] chest pain [ ] orthopnea [ ] palpitations [ ] murmur  Resp: [ ] negative [ ] cough [ ] shortness of breath [ ] dyspnea [ ] wheezing [ ] sputum [ ] hemoptysis  GI: [ ] negative [ ] nausea [ ] vomiting [ ] diarrhea [ ] constipation [ ] abd pain [ ] dysphagia   : [ ] negative [ ] dysuria [ ] nocturia [ ] hematuria [ ] increased urinary frequency  Musculoskeletal: [ ] negative [ ] back pain [ ] myalgias [ ] arthralgias [ ] fracture  Skin: [ ] negative [ ] rash [ ] itch  Neurological: [ ] negative [ ] headache [ ] dizziness [ ] syncope [ ] weakness [ ] numbness  Psychiatric: [ ] negative [ ] anxiety [ ] depression  Endocrine: [ ] negative [ ] diabetes [ ] thyroid problem  Hematologic/Lymphatic: [ ] negative [ ] anemia [ ] bleeding problem  Allergic/Immunologic: [ ] negative [ ] itchy eyes [ ] nasal discharge [ ] hives [ ] angioedema  [ ] All other systems negative  [ ] Unable to assess ROS because ________    OBJECTIVE:  ICU Vital Signs Last 24 Hrs  T(C): 36 (14 May 2023 04:00), Max: 36.8 (14 May 2023 00:00)  T(F): 96.8 (14 May 2023 04:00), Max: 98.2 (14 May 2023 00:00)  HR: 73 (14 May 2023 06:39) (66 - 90)  BP: --  BP(mean): --  ABP: 115/51 (14 May 2023 06:39) (91/45 - 133/75)  ABP(mean): 75 (14 May 2023 06:39) (32 - 96)  RR: 20 (14 May 2023 04:00) (14 - 41)  SpO2: 100% (14 May 2023 06:39) (75% - 100%)    O2 Parameters below as of 14 May 2023 06:39  Patient On (Oxygen Delivery Method): nasal cannula, high flow  O2 Flow (L/min): 60  O2 Concentration (%): 80          05-12 @ 07:01  -  05-13 @ 07:00  --------------------------------------------------------  IN: 350 mL / OUT: 2950 mL / NET: -2600 mL    05-13 @ 07:01  - 05-14 @ 06:57  --------------------------------------------------------  IN: 163 mL / OUT: 1565 mL / NET: -1402 mL      CAPILLARY BLOOD GLUCOSE      POCT Blood Glucose.: 110 mg/dL (13 May 2023 21:27)      Physical Exam:   Constitutional: ill appearing, no acute distress   HEENT: + PERRLA, EOMI, no drainage or redness  Neck: supple,  No JVD  Respiratory: Ventilator assisted breath Sounds equal & clear bilaterally to auscultation, no accessory muscle use noted  Cardiovascular: Regular rate, regular rhythm, normal S1, S2; no murmurs or rub  Gastrointestinal: Soft, non-tender, non distended, no hepatosplenomegaly, normal bowel sounds  Extremities: + 2 peripheral edema, no cyanosis, no clubbing   Vascular: Equal and normal pulses: 2+ peripheral pulses throughout  Neurological: sedated/intubated  Psychiatric: calm, normal mood, normal affect  Musculoskeletal: No joint swelling or deformity; no limitation of movement  Skin: warm, dry, well perfused, no rashes    HOSPITAL MEDICATIONS:  Standing Meds:  amitriptyline 10 milliGRAM(s) Oral at bedtime  atorvastatin 20 milliGRAM(s) Oral at bedtime  buMETAnide Injectable 1 milliGRAM(s) IV Push <User Schedule>  chlorhexidine 2% Cloths 1 Application(s) Topical <User Schedule>  cholecalciferol 1000 Unit(s) Oral daily  DAPTOmycin IVPB      DAPTOmycin IVPB 650 milliGRAM(s) IV Intermittent every 24 hours  dextrose 5%. 1000 milliLiter(s) IV Continuous <Continuous>  dextrose 5%. 1000 milliLiter(s) IV Continuous <Continuous>  dextrose 50% Injectable 12.5 Gram(s) IV Push once  dextrose 50% Injectable 25 Gram(s) IV Push once  dextrose 50% Injectable 25 Gram(s) IV Push once  DULoxetine 30 milliGRAM(s) Oral daily  enoxaparin Injectable 90 milliGRAM(s) SubCutaneous every 12 hours  glucagon  Injectable 1 milliGRAM(s) IntraMuscular once  insulin glargine Injectable (LANTUS) 14 Unit(s) SubCutaneous at bedtime  insulin lispro (ADMELOG) corrective regimen sliding scale   SubCutaneous three times a day before meals  insulin lispro (ADMELOG) corrective regimen sliding scale   SubCutaneous at bedtime  insulin lispro Injectable (ADMELOG) 7 Unit(s) SubCutaneous three times a day before meals  lactobacillus acidophilus 1 Tablet(s) Oral three times a day with meals  lidocaine   4% Patch 1 Patch Transdermal daily  meropenem  IVPB 1000 milliGRAM(s) IV Intermittent every 8 hours  methylPREDNISolone sodium succinate Injectable 40 milliGRAM(s) IV Push every 8 hours  metoprolol tartrate 25 milliGRAM(s) Oral three times a day  mupirocin 2% Ointment 1 Application(s) Topical two times a day  sildenafil (REVATIO) 10 milliGRAM(s) Oral every 8 hours  trimethoprim  40 mG/sulfamethoxazole 200 mG Suspension 400 milliGRAM(s) Oral every 8 hours      PRN Meds:  albuterol/ipratropium for Nebulization 3 milliLiter(s) Nebulizer every 6 hours PRN  bisacodyl Suppository 10 milliGRAM(s) Rectal daily PRN  dextrose Oral Gel 15 Gram(s) Oral once PRN  guaiFENesin Oral Liquid (Sugar-Free) 200 milliGRAM(s) Oral every 6 hours PRN  naproxen 500 milliGRAM(s) Oral two times a day PRN      LABS:                        8.2    8.95  )-----------( 189      ( 14 May 2023 00:10 )             27.1     Hgb Trend: 8.2<--, 8.4<--, 8.4<--, 8.5<--, 8.3<--  05-14    134<L>  |  96<L>  |  27<H>  ----------------------------<  153<H>  4.5   |  28  |  1.14    Ca    9.3      14 May 2023 00:10  Phos  3.8     05-14  Mg     2.40     05-14    TPro  6.1  /  Alb  2.4<L>  /  TBili  0.3  /  DBili  x   /  AST  18  /  ALT  18  /  AlkPhos  138<H>  05-14    Creatinine Trend: 1.14<--, 0.96<--, 0.85<--, 0.78<--, 0.80<--, 0.72<--      Arterial Blood Gas:  05-14 @ 00:10  7.40/50/89/31/97.3/5.5  ABG lactate: --  Arterial Blood Gas:  05-13 @ 16:32  7.45/49/106/34/97.3/9.0  ABG lactate: --  Arterial Blood Gas:  05-13 @ 10:00  7.51/43/119/34/98.8/10.3  ABG lactate: --  Arterial Blood Gas:  05-13 @ 07:35  7.46/50/96/36/97.9/10.5  ABG lactate: --  Arterial Blood Gas:  05-13 @ 01:05  7.48/48/85/36/96.9/11.0  ABG lactate: --  Arterial Blood Gas:  05-12 @ 20:42  7.50/42/73/33/95.2/8.8  ABG lactate: --        MICROBIOLOGY:     RADIOLOGY:  [ ] Reviewed and interpreted by me         Interval Events:  -No acute events reported overnight  -Remains on Nitric 40 PPM      HPI:  62 year old female with a PMHx of IDL on 3.5L home O2, ALMA on CPAP and chronic prednisone, pulmonary HTN, T2DM, RA, psoriatic arthritis who initially present to [facility] for worsening dyspnea/ hypoxia requiring HFCA and BIPAP and was transferred to [facility] further management She has had multiple recent hospitalizations for acute on chronic hypoxic respiratory failure 2/2 IDL flair/ PNA. During her hospitalization she had a rapid response called (on 4/21) for left facial droop and AMS c/f possible stroke. Imagining at the time demonstrated high grade L sided carotid stenosis for which pt and family decided not to pursue surgical intervention.   Pt was then found to be febrile and tachycardic, and was transferred to ICU for sepsis with multifactorial encephalopathy and AHRF. Pts blood cultures grew MRSA and she was initially treated with vancomycin (4/21-4/25 and 5/3-5/8) but continued to have positive blood cultures x5. She was also started on Merrem (4/21-4/23) and subsequently started on Daptomycin on 4/25 with dose increase on 5/1. Plan was to continue the Daptomycin for a 6 week course and Rifampin (started on 5/1) for a 14 day course. Of note, pt had negative blood cx x2 on 5/4/23. Pt was found to be febrile again on 5/8 with a rectal temp of 100.7. Fungitell was ordered and pt was started on Meropenem. Possible suspected source was superficial thrombophlebitis (seen on U/S in the left cephalic vein). ID recommended NM whole body scan/ indium scan and ELICEO to r/o other source of persistent bacteremia, however pt was unable to lie flat given her respiratory status and these studies were not pursued (At the time of transfer, pt remained on dapto, abeba, and rifampine)  Pt hospital course was further complicated by hypercalcemia likely PTH-mediated ISO a L parathyroid adenoma. She was started on IVF and given Alendronate x1 with improvement. Palliative care was also consulted for multimodal pain management for diffuse arthralgias/myalgias possible 2/2 RA vs fibromyalgia     (09 May 2023 23:13)      REVIEW OF SYSTEMS:  Constitutional: [x] negative [ ] fevers [ ] chills [ ] weight loss [ ] weight gain  HEENT: [x] negative [ ] dry eyes [ ] eye irritation [ ] postnasal drip [ ] nasal congestion  CV: [x] negative  [ ] chest pain [ ] orthopnea [ ] palpitations [ ] murmur  Resp: [ ] negative [ ] cough [x] shortness of breath [ ] dyspnea [ ] wheezing [ ] sputum [ ] hemoptysis  GI: [x] negative [ ] nausea [ ] vomiting [ ] diarrhea [ ] constipation [ ] abd pain [ ] dysphagia   : [x] negative [ ] dysuria [ ] nocturia [ ] hematuria [ ] increased urinary frequency  Musculoskeletal: [ ] negative [x] back pain [ ] myalgias [ ] arthralgias [ ] fracture  Skin: [x] negative [ ] rash [ ] itch  Neurological: [x] negative [ ] headache [ ] dizziness [ ] syncope [ ] weakness [ ] numbness  Psychiatric: [x] negative [ ] anxiety [ ] depression  Endocrine: [x negative [ ] diabetes [ ] thyroid problem  Hematologic/Lymphatic: [x negative [ ] anemia [ ] bleeding problem  Allergic/Immunologic: [x]negative [ ] itchy eyes [ ] nasal discharge [ ] hives [ ] angioedema  [x All other systems negative  [ ] Unable to assess ROS because ________    OBJECTIVE:  ICU Vital Signs Last 24 Hrs  T(C): 36 (14 May 2023 04:00), Max: 36.8 (14 May 2023 00:00)  T(F): 96.8 (14 May 2023 04:00), Max: 98.2 (14 May 2023 00:00)  HR: 73 (14 May 2023 06:39) (66 - 90)  BP: --  BP(mean): --  ABP: 115/51 (14 May 2023 06:39) (91/45 - 133/75)  ABP(mean): 75 (14 May 2023 06:39) (32 - 96)  RR: 20 (14 May 2023 04:00) (14 - 41)  SpO2: 100% (14 May 2023 06:39) (75% - 100%)    O2 Parameters below as of 14 May 2023 06:39  Patient On (Oxygen Delivery Method): nasal cannula, high flow  O2 Flow (L/min): 60  O2 Concentration (%): 80          05-12 @ 07:01  -  05-13 @ 07:00  --------------------------------------------------------  IN: 350 mL / OUT: 2950 mL / NET: -2600 mL    05-13 @ 07:01 - 05-14 @ 06:57  --------------------------------------------------------  IN: 163 mL / OUT: 1565 mL / NET: -1402 mL      CAPILLARY BLOOD GLUCOSE      POCT Blood Glucose.: 110 mg/dL (13 May 2023 21:27)      Physical Exam:     Physical Exam:   Constitutional: no acute distress   HEENT: + PERRLA, EOMI, no drainage or redness  Neck: supple,  No JVD  Respiratory: scattered Rhonchi B/L    Cardiovascular: Regular rate, regular rhythm, normal S1, S2; no murmurs or rub  Gastrointestinal: obese, soft, non-tender, non distended, no hepatosplenomegaly, normal bowel sounds  Extremities: + 2 peripheral edema, no cyanosis, no clubbing   Vascular: Equal and normal pulses: 2+ peripheral pulses throughout  Neurological: alert and oriented   Psychiatric: anxious at times   Musculoskeletal: No joint swelling or deformity; no limitation of extremities, but noted weakness  Skin: warm, dry, well perfused, no rashes, ecchymotic upper extremities     HOSPITAL MEDICATIONS:  Standing Meds:  amitriptyline 10 milliGRAM(s) Oral at bedtime  atorvastatin 20 milliGRAM(s) Oral at bedtime  buMETAnide Injectable 1 milliGRAM(s) IV Push <User Schedule>  chlorhexidine 2% Cloths 1 Application(s) Topical <User Schedule>  cholecalciferol 1000 Unit(s) Oral daily  DAPTOmycin IVPB      DAPTOmycin IVPB 650 milliGRAM(s) IV Intermittent every 24 hours  dextrose 5%. 1000 milliLiter(s) IV Continuous <Continuous>  dextrose 5%. 1000 milliLiter(s) IV Continuous <Continuous>  dextrose 50% Injectable 12.5 Gram(s) IV Push once  dextrose 50% Injectable 25 Gram(s) IV Push once  dextrose 50% Injectable 25 Gram(s) IV Push once  DULoxetine 30 milliGRAM(s) Oral daily  enoxaparin Injectable 90 milliGRAM(s) SubCutaneous every 12 hours  glucagon  Injectable 1 milliGRAM(s) IntraMuscular once  insulin glargine Injectable (LANTUS) 14 Unit(s) SubCutaneous at bedtime  insulin lispro (ADMELOG) corrective regimen sliding scale   SubCutaneous three times a day before meals  insulin lispro (ADMELOG) corrective regimen sliding scale   SubCutaneous at bedtime  insulin lispro Injectable (ADMELOG) 7 Unit(s) SubCutaneous three times a day before meals  lactobacillus acidophilus 1 Tablet(s) Oral three times a day with meals  lidocaine   4% Patch 1 Patch Transdermal daily  meropenem  IVPB 1000 milliGRAM(s) IV Intermittent every 8 hours  methylPREDNISolone sodium succinate Injectable 40 milliGRAM(s) IV Push every 8 hours  metoprolol tartrate 25 milliGRAM(s) Oral three times a day  mupirocin 2% Ointment 1 Application(s) Topical two times a day  sildenafil (REVATIO) 10 milliGRAM(s) Oral every 8 hours  trimethoprim  40 mG/sulfamethoxazole 200 mG Suspension 400 milliGRAM(s) Oral every 8 hours      PRN Meds:  albuterol/ipratropium for Nebulization 3 milliLiter(s) Nebulizer every 6 hours PRN  bisacodyl Suppository 10 milliGRAM(s) Rectal daily PRN  dextrose Oral Gel 15 Gram(s) Oral once PRN  guaiFENesin Oral Liquid (Sugar-Free) 200 milliGRAM(s) Oral every 6 hours PRN  naproxen 500 milliGRAM(s) Oral two times a day PRN      LABS:                        8.2    8.95  )-----------( 189      ( 14 May 2023 00:10 )             27.1     Hgb Trend: 8.2<--, 8.4<--, 8.4<--, 8.5<--, 8.3<--  05-14    134<L>  |  96<L>  |  27<H>  ----------------------------<  153<H>  4.5   |  28  |  1.14    Ca    9.3      14 May 2023 00:10  Phos  3.8     05-14  Mg     2.40     05-14    TPro  6.1  /  Alb  2.4<L>  /  TBili  0.3  /  DBili  x   /  AST  18  /  ALT  18  /  AlkPhos  138<H>  05-14    Creatinine Trend: 1.14<--, 0.96<--, 0.85<--, 0.78<--, 0.80<--, 0.72<--      Arterial Blood Gas:  05-14 @ 00:10  7.40/50/89/31/97.3/5.5  ABG lactate: --  Arterial Blood Gas:  05-13 @ 16:32  7.45/49/106/34/97.3/9.0  ABG lactate: --  Arterial Blood Gas:  05-13 @ 10:00  7.51/43/119/34/98.8/10.3  ABG lactate: --  Arterial Blood Gas:  05-13 @ 07:35  7.46/50/96/36/97.9/10.5  ABG lactate: --  Arterial Blood Gas:  05-13 @ 01:05  7.48/48/85/36/96.9/11.0  ABG lactate: --  Arterial Blood Gas:  05-12 @ 20:42  7.50/42/73/33/95.2/8.8  ABG lactate: --        MICROBIOLOGY:     RADIOLOGY:  [ ] Reviewed and interpreted by me

## 2023-05-15 NOTE — PHYSICAL THERAPY INITIAL EVALUATION ADULT - ADDITIONAL COMMENTS
Pt lives with her daughter in an apartment with 6 steps to enter, +chair lift to the apartment. prior to admission, Pt ambulated short distances with rollator. Pt also has an electronic scooter and rollator. Pts daughter assists with stair mobility. Pt reports to have had Home PT prior to admission.     Pt left in semi-supine, all lines/tubes intact, NAD, with call bell in reach. RN Araceli lassiter.
Pt lives with her daughter in an apartment with 6 steps to enter, +chair lift to the apartment. prior to admission, Pt ambulated short distances with rollator. Pt also has an electronic scooter and rollator. Pts daughter assists with stair mobility. Pt reports to have had Home PT prior to admission.     Pt left in semi-supine, all lines/tubes intact, NAD, with call bell in reach. RN Araceli lassiter.

## 2023-05-15 NOTE — ADVANCED PRACTICE NURSE CONSULT - REASON FOR CONSULT
Patient seen on skin care rounds after wound care referral received for assessment of skin impairment and recommendations of topical management. As per H&P, patient is a 62 year old female with a PMHx of IDL on 3.5L home O2, ALMA on CPAP and chronic prednisone, pulmonary HTN, T2DM, RA, psoriatic arthritis who initially present to [facility] for worsening dyspnea/ hypoxia requiring HFCA and BIPAP and was transferred to [facility] further management She has had multiple recent hospitalizations for acute on chronic hypoxic respiratory failure 2/2 IDL flair/ PNA. During her hospitalization she had a rapid response called (on 4/21) for left facial droop and AMS c/f possible stroke. Imagining at the time demonstrated high grade L sided carotid stenosis for which pt and family decided not to pursue surgical intervention. Pt was then found to be febrile and tachycardic, and was transferred to ICU for sepsis with multifactorial encephalopathy and AHRF. Chart reviewed: WBC 7.87, H/H 7.6/25.6, Platelets 200, INR 1.08, Serum albumin 2.4, A1C 7.1, BMI 35.1, Ananda 9. 
Patient known to [facility] service line last seen on 5/10/23. Patient seen on skin care rounds after wound care referral received for assessment of skin impairment and recommendations of topical management. Chart reviewed: WBC 10.48, H/H 7.8/25.9, Platelets 262, INR 1.08, Serum albumin 2.4, A1C 7.1, BMI 35.1, Ananda 15.

## 2023-05-15 NOTE — PROGRESS NOTE ADULT - NS ATTEND AMEND GEN_ALL_CORE FT
Pt seen and examined. 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 Remains on HFNC, titrate down as tolerated to keep O2 sat > 88 %. Cont Sildenafil tID and titrate down Angie as tolerated. Cont Bactrim for PJP pneumonia and decrease solumedrol to 40 mg IV BID. TTE with severe RV volume and pressure overload, cont IV diuresis with Bumex to keep overall negative fluid balance. Cont to monitor Cr, UO and electrolytes. On empiric AC with Lovenox for possible PE, unable to get CTPA due to tenuous resp status an inability to lay flat. Now with intermittent  small amounts of hemoptysis. Start hycodan Q8H for cough and monitor hemoptysis an CBC. Will hold AC if Hb trending down or hemoptysis increases. Awaiting b/l LE dopplers. Will discuss transplant candidacy with lung transplant team. Overall prognosis extremely guarded. Remains full code. Patient and  in detail updated at bedside. Pt seen and examined. 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 Remains on HFNC, titrate down as tolerated to keep O2 sat > 88 %. Cont Sildenafil tID and titrate down Angie as tolerated. Cont Bactrim for PJP pneumonia and decrease solumedrol to 40 mg IV BID. TTE with severe RV volume and pressure overload, cont IV diuresis with Bumex to keep overall negative fluid balance. Cont to monitor Cr, UO and electrolytes. On empiric AC with Lovenox for possible PE, unable to get CTPA due to tenuous resp status an inability to lay flat. Now with intermittent  small amounts of hemoptysis. Start hycodan Q8H for cough and monitor hemoptysis an CBC. Will hold AC if Hb trending down or hemoptysis increases. Awaiting b/l LE dopplers. Will discuss transplant candidacy with lung transplant team. Cont Daptomycin IV for MRSA bacteremia, BCxs from 5/9 NGTD. Unable to get ELICEO to r/o endocarditis d/t resp instability. Overall prognosis extremely guarded. Remains full code. Patient and  in detail updated at bedside.

## 2023-05-15 NOTE — ADVANCED PRACTICE NURSE CONSULT - RECOMMEDATIONS
Recommending use of external fecal  to manage incontinence of liquid stools.     Topical recommendations:     Sacrum, buttocks, perirectal: Cleanse with skin cleanser, pat dry. Apply a thin layer of TRIAD paste/hydrophilic dressing twice a day and PRN with incontinent episodes. With episodes of incontinence only remove soiled layer of Triad, then reinforce with thin layer.     B/l breasts/abdominal pannus: Cleanse with skin cleanser, pat dry. Place Interdry textile sheeting, under intertriginous folds leaving 2 inches exposed at ends to wick, remove to wash & dry affected area, then replace. Individual sheeting may be used for up to 5 days unless soiled. Inspect skin daily.     Apply Sween 24 Moisturizer to b/l UE and LE daily, avoiding in between the toes.     Continue low air loss bed therapy, continue heel elevation, continue to turn & reposition as per protocol, soft pillow between bony prominences, continue moisture management with barrier creams & single breathable pad, continue measures to decrease friction/shear/pressure. Continue with nutritional support as per dietary/orders.     Plan of care discussed with Primary RN Araceli, daughter Joyce at bedside, and patient. All questions and concerns answered.     Please contact Wound/Ostomy Care Service Line if we can be of further assistance (ext [phone]). 
Topical recommendations:     Sacrum and buttocks: Cleanse with skin cleanser, pat dry. Apply a thin layer of TRIAD paste/hydrophilic dressing twice a day and PRN with incontinent episodes. With episodes of incontinence only remove soiled layer of Triad, then reinforce with thin layer.     B/l breasts/abdominal pannus: Cleanse with skin cleanser, pat dry. Place Interdry textile sheeting, under intertriginous folds leaving 2 inches exposed at ends to wick, remove to wash & dry affected area, then replace. Individual sheeting may be used for up to 5 days unless soiled. Inspect skin daily.     Continue low air loss bed therapy, continue heel elevation, continue to turn & reposition as per protocol, soft pillow between bony prominences, continue moisture management with barrier creams & single breathable pad, continue measures to decrease friction/shear/pressure. Continue with nutritional support as per dietary/orders.     Plan of care discussed with Primary RN Araceli     Please contact Wound/Ostomy Care Service Line if we can be of further assistance (ext [phone]).

## 2023-05-15 NOTE — PHYSICAL THERAPY INITIAL EVALUATION ADULT - PLANNED THERAPY INTERVENTIONS, PT EVAL
balance training/bed mobility training/gait training/strengthening/transfer training
stair training/balance training/bed mobility training/gait training/strengthening/transfer training

## 2023-05-15 NOTE — PHYSICAL THERAPY INITIAL EVALUATION ADULT - NSPTDISCHREC_GEN_A_CORE
Anticipated discharge to rehab facility to address current functional limitation to optimize safety to allow pt. to reach their optimal level of function.
Anticipated discharge to rehab facility to address current functional limitation to optimize safety to allow pt. to reach their optimal level of function.

## 2023-05-15 NOTE — PROGRESS NOTE ADULT - ASSESSMENT
62F with ILD on 3.5L home O2/CPAP and chronic prednisone, pulmonary HTN, DM2, RA, psoriatic arthritis presented to [facility] ED for worsening dyspnea and hypoxia April 2023, admitted for ILD with exacerbation and acute on chronic hypoxic respiratory failure. She was treated with high-dose corticosteroids and started on HFNC, course complicated by AMS, found to have demonstrated high grade L-sided carotid stenosis, course further complicated by worsening hypoxia requiring BIPAP and fever, transferred to MICU, found to have MRSA bacteremia (4/24), initially treated with Vancomycin, switched to Daptomycin (4/25) with subsequent increased dose on (5/1), Blood culture cleared on (5/4), suspected source was from superficial thrombophlebitis, seen on US (4/17: Superficial thrombus is seen in the left cephalic vein at the level of the forearm. No DVT), planned for 6 weeks course of dapto with Rifampin 300mg PO q12 for total of 2 week course, course again complicated by low grade fever 100.7F (5/8), added meropenem for empiric coverage. Eventually patient transferred to [facility], ID consulted for assistance.    Denies any cardiac device, hardware, or prosthetic joint replacement  has not received biologics for arthritis in last year     CXR with diffuse opacity consistent with ILD  US LUE (4/17): Superficial thrombus is seen in the left cephalic vein at the level of the forearm. No DVT  BCx (4/24, 4/25, 4/27, 4/28, 5/1, 5/2) positive for MRSA  BCx (5/4) no growth  TTE (5/10) without obvious vegetation   BCx 5/9 no growth to date     Antimicrobials :    DAPTOmycin IVPB    DAPTOmycin IVPB 650 every 24 hours  meropenem  IVPB 1000 every 8 hours  trimethoprim  40 mG/sulfamethoxazole 200 mG Suspension 400 every 8 hours      #MRSA Bacteremia   high grade at outside hospital   unclear source cultures here 5/9 no growth   #Acute Hypoxic Respiratory Failure 2/2 ILD    - bacteremia source was suspected L thrombophlebitis at [place] , but still consider endocarditis in ddx no obvious joint infection though is a consideration in patient with psoriatic arthritis   - completed course meropenem for possible HAP  - bactrim added for possible pcp -elevated fungitell     RECOMMENDATIONS    - continue dapto    - continue bactrim po treatment dose  - sputum for PCP PCR  - ELICEO when feasible   - Indium scan when feasible

## 2023-05-15 NOTE — PHYSICAL THERAPY INITIAL EVALUATION ADULT - GENERAL OBSERVATIONS, REHAB EVAL
Pt received semi-supine, +IV, +monitor lines, +high flow nasal cannula, all lines/tubes intact, NAD. HR: 96 beats per minute, oxygen saturation: 100%, BP: 127/80
Pt received in semi-supine, +muñiz, +IV, +monitor lines, +high flow nasal cannula, all lines/tubes intact, NAD. Heart rate: 80 beats per minute, oxygen saturation: 100%

## 2023-05-15 NOTE — PHYSICAL THERAPY INITIAL EVALUATION ADULT - MANUAL MUSCLE TESTING RESULTS, REHAB EVAL
bilateral upper extremities at least 3-/5, bilateral lower extremities at least 2+/5/grossly assessed due to
bilateral upper extremity grossly assessed grade 3-/5. bilateral lower extremity grossly assessed a grade 2+/5/grossly assessed due to

## 2023-05-15 NOTE — PHYSICAL THERAPY INITIAL EVALUATION ADULT - IMPAIRMENTS FOUND, PT EVAL
aerobic capacity/endurance/gait, locomotion, and balance
aerobic capacity/endurance/gait, locomotion, and balance/muscle strength/sensory integrity

## 2023-05-15 NOTE — PROGRESS NOTE ADULT - ASSESSMENT
This is a 63 y/o F with PMH of DM2, HTN, HLD, obesity, non-obstructive CAD, ALMA on CPAP, and ILD thought secondary to prior Methotrexate use, RA and Psoriatic arthritis who was transferred from [facility] on 5/9 for acute hypoxemic respiratory failure in the setting of recent MRSA bacteremia and suspected ILD exacerbation    Neuro  Alerted and oriented x4 at baseline, hx of pain RRT on 4/21 at [facility] for left facial droop, found to have left sided carotid stenosis only   -currently alert and oriented   -Palliative care was consulted at OSH for multimodal pain management for diffuse arthralgias/myalgias possible 2/2 RA vs fibromyalgia    -c/w Cymbalta, amitriptyline, lidocaine patch, naproxen, tylenol   -PT following  -encourage OOB to chair if tolerated, having difficulties due to low SPO2 and tachypnea    Pulmonary    Hx of past smoker, ALMA on CPAP, ILD 2/2 ?methotrexate use, pulmonary HTN (remotely on sildenafil stopped ~1 month ago)  who presented to [facility] on 4/10 for acute hypoxemic respiratory failure was on 40L/70% at [facility] alternating with BIPAP 12/5 70%, episodes of severe hypoxia and tachypnea when lying flat (~55-70%) started on NO on 5/10 with mild improvement.     - currently on NO 40PPM plan to come down to a goal of 20PPM as tolerated in the next 24 hours   - Continue HFNC 60L/ titrating fio2 as needed - currently on 100% will taper down as tolerated  - sildenafil increased from 10mg PO q8 hours to 20mg TID, may need to increase if no improvement  - methemoglobin today 0.9, continue to trend as needed while on NO   - cant use nocturnal Bipap d/t NO - no significant hypercapnia on ABG  - Bronchodilators prn   - Continue steroids for ILD currently on solumedrol 40mg Q8h will change to BID today   - Fungitell elevated to 246 - started on PCP treatment with Bactrim - transitioned to PO to decrease fluid load  - follows Dr. Kim at [facility] who has reportedly been caring for patient  - will eventually obtain CTPA as patient has been hospitalized on and off since early April - presently unable to lie flat therefore continue therapeutic AC if no contraindication    Cardiac   Hx of HTN and nonobstructive CAD, reported diastolic dysfunction, and pulmonary hypertension (last left/right sided cath at [facility] 12/2022)  - continue statin   - c/w metoprolol tartrate 25mg TID  - holding nifedipine given normotension   - official ECHO 5/10 with Normal LV systolic function, RV enlargement with decreased RV systolic function and severe tricuspid regurgitation. Estimated pulmonary artery systolic pressure equals 96 mm,   evidence of severe pulmonary hypertension.  - pro-BNP 48047 on 5/10  - c/w aggressive diuresis with Bumex and continue to monitor electrolytes    GI  No acute issues  -tolerating diet - regular consistent carb  -Was reportedly on PPI at home will continue while on steroids   -active BM    /Renal  Creat on admission 0.5  -now uptrending 0.85, SHAAN likely medication induced (bactrim)  -continue diuresis with Bumex however can decrease from 2mg IV Q8 to 1mg q6 hours as lactate uptrends,  goal keep net negative 1-2 liters   -replete electrolytes and trend Q8h while diuresing   -muñiz in place with good UO       Endocrine   DM2 A1c 7.1, hyperglycemia on steroids, reported to have hypercalcemia 2/2 left parathyroid adenoma s/p Alendronate x1   - continue sliding scale and increase premeal 5 units to 7 units, increase as needed - continue Lantus to 14 units  - prednisone 40mg daily changed to solumedrol 40mg Q8h on 5/10 - this will be more than enough for PCP treatment which was started 5/11  - s/p Alendronate x1 with resolved hypercalcemia will continue to monitor    - Thyroid nodule noted on imaging - will need outpatient follow-up    Rheumatology   Hx of RA/psoriatic arthritis diagnosed ~2016, now seronegative, follows with Dr. Rush outpatient, failed multiple modalities (methotrexate, humira, xeljanz and rituximab due to insurance issues) recently on leflunomide.   -OSH records showed patient was negative  Kassi-1, SS-B, SS-A, RNP, SIRISHA, and RF   -repeated RF, SIRISHA, Double stranded DNA, neutrophil cytoplasmic antibody, myomarker panel   -Now with negative RF - per Dr. Rush was previously seropositive    Infectious Disease   MRSA bacteremia at [facility]. Cultures grew MRSA and was initially treated with vancomycin (4/21-4/25 and 5/3-5/8) but continued to have positive blood cultures x5 and subsequently started on Daptomycin on 4/25 with dose increase on 5/1. As per OSH records, the plan was to continue the Daptomycin for a 6 week course and Rifampin (started on 5/1) for a 14 day course.  - continue current antibiotics: 6 week course of Daptomycin for MRSA bactermia (5/1-   ) and Bactrim PCP (5/11- )  given increased LDH and Fungitell  - s/p empiric Meropenem (5/8-5/14 )  - CK 22   - f/u repeat blood cultures here   - RVP negative  - LDH elevated to 743   - Pending sputum PCP PRC if able.  - ID following      Heme/DVT PPX  Anemia   - Required PRBC transfusion 5/11 - has been stable since then  -c/w full dose lovenox SQ for now - and monitor Hb  -FOBT positive but stool is brown. Will monitor  -Ecchymoses noted over RUE - has good pulses. Will outline and monitor for change.  -No signs of hemolysis  -will f/u iron studies       GOC:   -Full Code - MOLST from [facility] confirmed with patient  -HCP filled out with daughter Joyce Alicea as primary and secondary is her sister Emi Sánchez   -D/w Patient and  at bedside today (5/12) and all questions answered   This is a 63 y/o F with PMH of DM2, HTN, HLD, obesity, non-obstructive CAD, ALMA on CPAP, and ILD thought secondary to prior Methotrexate use, RA and Psoriatic arthritis who was transferred from [facility] on 5/9 for acute hypoxemic respiratory failure in the setting of recent MRSA bacteremia and suspected ILD exacerbation    Neuro  Alerted and oriented x4 at baseline, hx of pain RRT on 4/21 at [facility] for left facial droop, found to have left sided carotid stenosis only   -currently alert and oriented   -Palliative care was consulted at OSH for multimodal pain management for diffuse arthralgias/myalgias possible 2/2 RA vs fibromyalgia    -c/w Cymbalta, amitriptyline, lidocaine patch, naproxen, tylenol   -PT following  -encourage OOB to chair if tolerated, having difficulties due to low SPO2 and tachypnea    Pulmonary    Hx of past smoker, ALMA on CPAP, ILD 2/2 ?methotrexate use, pulmonary HTN (remotely on sildenafil stopped ~1 month ago)  who presented to [facility] on 4/10 for acute hypoxemic respiratory failure was on 40L/70% at [facility] alternating with BIPAP 12/5 70%, episodes of severe hypoxia and tachypnea when lying flat (~55-70%) started on NO on 5/10 with mild improvement.   -noted some hemoptysis this AM, will start hycodan, flonase, and saline nasal spray and monitor closely     - currently on NO 40PPM plan to come down to a goal of 20PPM as tolerated in the next 24 hours   - Continue HFNC 60L/ titrating fio2 as needed - currently on 100% will taper down as tolerated  - sildenafil increased from 10mg PO q8 hours to 20mg TID, may need to increase if no improvement  - methemoglobin today 0.9, continue to trend as needed while on NO   - cant use nocturnal Bipap d/t NO - no significant hypercapnia on ABG  - Bronchodilators prn   - Continue steroids for ILD currently on solumedrol 40mg Q8h will change to BID today   - Fungitell elevated to 246 - started on PCP treatment with Bactrim - transitioned to PO to decrease fluid load  - follows Dr. Kim at [facility] who has reportedly been caring for patient  - will eventually obtain CTPA as patient has been hospitalized on and off since early April - presently unable to lie flat therefore continue therapeutic AC if no contraindication    Cardiac   Hx of HTN and nonobstructive CAD, reported diastolic dysfunction, and pulmonary hypertension (last left/right sided cath at [facility] 12/2022)  - continue statin   - c/w metoprolol tartrate 25mg TID  - holding nifedipine given normotension   - official ECHO 5/10 with Normal LV systolic function, RV enlargement with decreased RV systolic function and severe tricuspid regurgitation. Estimated pulmonary artery systolic pressure equals 96 mm,   evidence of severe pulmonary hypertension.  - pro-BNP 32098 on 5/10  - c/w aggressive diuresis with Bumex and continue to monitor electrolytes    GI  No acute issues  -tolerating diet - regular consistent carb  -Was reportedly on PPI at home will continue while on steroids   -active BM    /Renal  Creat on admission 0.5  -now uptrending 0.85, SHAAN likely medication induced (bactrim)  -continue diuresis with Bumex 1mg BID to keep net negative 1L  -replete electrolytes and trend Q8h while diuresing   -muñiz in place with good UO       Endocrine   DM2 A1c 7.1, hyperglycemia on steroids, reported to have hypercalcemia 2/2 left parathyroid adenoma s/p Alendronate x1   - continue sliding scale and increase premeal 5 units to 7 units, increase as needed - continue Lantus to 14 units  - prednisone 40mg daily changed to solumedrol 40mg Q8h on 5/10 for ILD treatment but has shown minimal improvement  -will change steroids to solumedrol 40mg BID as part of PCP treatment   - s/p Alendronate x1 with resolved hypercalcemia will continue to monitor    - Thyroid nodule noted on imaging - will need outpatient follow-up    Rheumatology   Hx of RA/psoriatic arthritis diagnosed ~2016, now seronegative, follows with Dr. Rush outpatient, failed multiple modalities (methotrexate, humira, xeljanz and rituximab due to insurance issues) recently on leflunomide.   -OSH records showed patient was negative  Kassi-1, SS-B, SS-A, RNP, SIRISHA, and RF   -repeated RF, SIRISHA, Double stranded DNA, neutrophil cytoplasmic antibody, myomarker panel   -Now with negative RF - per Dr. Rush was previously seropositive    Infectious Disease   MRSA bacteremia at [facility]. Cultures grew MRSA and was initially treated with vancomycin (4/21-4/25 and 5/3-5/8) but continued to have positive blood cultures x5 and subsequently started on Daptomycin on 4/25 with dose increase on 5/1. As per OSH records, the plan was to continue the Daptomycin for a 6 week course and Rifampin (started on 5/1) for a 14 day course.  - continue current antibiotics: 6 week course of Daptomycin for MRSA bactermia (5/1-   ) and Bactrim PCP (5/11- )  for 21 days given increased LDH and Fungitell  - s/p empiric Meropenem (5/8-5/14 )  - CK 22   - f/u repeat blood cultures here   - RVP negative  - LDH elevated to 743   - Pending sputum PCP PRC if able.  - ID following      Heme/DVT PPX  Anemia   - Required PRBC transfusion 5/11 - has been stable since then  -c/w full dose lovenox SQ for now - and monitor Hb  -FOBT positive but stool is brown. Will monitor  -Ecchymoses noted over RUE - has good pulses. Will outline and monitor for change.  -No signs of hemolysis  -will f/u iron studies       GOC:   -Full Code - MOLST from [facility] confirmed with patient  -HCP filled out with daughter Joyce Alicea as primary and secondary is her sister Emi Sánchez   -D/w Patient and  at bedside daily

## 2023-05-15 NOTE — ADVANCED PRACTICE NURSE CONSULT - ASSESSMENT
General: A&Ox4, able to turn with 2x assistance. Patient is currently on high flow nasal cannula, no erythema noted. Incontinent of urine and stool. Indwelling muñiz catheter in place, yellow urine in bag. Incontinence care provided during assessmennt for liquid stools. Skin warm, dry with increased moisture in intertriginous folds, scattered areas of hyperpigmentation and hypopigmentation, scattered areas of ecchymosis without hematoma on bilateral upper extremities and abdomen. Moisture associated dermatitis to b/l breasts and abdominal pannus as evidenced by blanchable erythema, no suspected candidiasis.     Vascular of b/l lower extremities: Dry, skin noted to bottom of feet. No temperature changes noted. No Edema. Capillary refill less than 3 seconds.    Sacrum, buttocks, perirectal: Healing Moisture associated dermatitis complicated by incontinence as evidenced by blanchable erythema, denudations and linear fissure within sacral/gluteal cleft and buttocks. Maceration noted to periwound of linear fissure. Impaired skin with irregular borders, exposing pink-moist dermis. Does not overly blaine prominences. *of note, compared to assessment on 5/10, patient noted to have improvement to areas of denudations, with evidence of re-epithelization noted.  General: A&Ox4, able to turn with 2x assistance. Patient is currently on high flow nasal cannula, no erythema noted. Incontinent of urine and stool. Indwelling muñiz catheter in place, yellow urine in bag. Incontinence care provided during assessmennt for liquid stools. Skin warm, dry with increased moisture in intertriginous folds, scattered areas of hyperpigmentation and hypopigmentation, scattered areas of ecchymosis without hematoma on bilateral upper extremities and abdomen. Moisture associated dermatitis to b/l breasts and abdominal pannus as evidenced by blanchable erythema, no suspected candidiasis.     Vascular of b/l lower extremities: Dry, skin noted to bottom of feet. No temperature changes noted. No Edema. Capillary refill less than 3 seconds.    Sacrum, buttocks, perirectal: Healing Moisture associated dermatitis complicated by incontinence as evidenced by blanchable erythema, denudations and linear fissure within sacral/gluteal cleft and buttocks. Maceration noted to periwound of linear fissure. Impaired skin with irregular borders, exposing pink-moist dermis. No suspected candidiasis. Does not overly blaine prominences. *of note, compared to assessment on 5/10, patient noted to have improvement to areas of denudations, with evidence of re-epithelization noted.

## 2023-05-15 NOTE — PHYSICAL THERAPY INITIAL EVALUATION ADULT - PERTINENT HX OF CURRENT PROBLEM, REHAB EVAL
Pt initially presented to [facility] for worsening dyspnea/ hypoxia and was admitted for intestitial lung disease with exacerbation and acute on chronic hypoxic respiratory failure. Pt was then transferred to [facility] for further management. Pt was transferred to ICU for sepsis with multifactorial encephalopathy and acute hypercapnic respiratory failure

## 2023-05-15 NOTE — PHYSICAL THERAPY INITIAL EVALUATION ADULT - CRITERIA FOR SKILLED THERAPEUTIC INTERVENTIONS
impairments found/risk reduction/prevention/rehab potential/anticipated discharge recommendation
impairments found/risk reduction/prevention/rehab potential

## 2023-05-15 NOTE — PROGRESS NOTE ADULT - SUBJECTIVE AND OBJECTIVE BOX
Interval Events:   -hemoptysis overnight       HPI:  62 year old female with a PMHx of IDL on 3.5L home O2, ALMA on CPAP and chronic prednisone, pulmonary HTN, T2DM, RA, psoriatic arthritis who initially present to [facility] for worsening dyspnea/ hypoxia requiring HFCA and BIPAP and was transferred to [facility] further management She has had multiple recent hospitalizations for acute on chronic hypoxic respiratory failure 2/2 IDL flair/ PNA. During her hospitalization she had a rapid response called (on 4/21) for left facial droop and AMS c/f possible stroke. Imagining at the time demonstrated high grade L sided carotid stenosis for which pt and family decided not to pursue surgical intervention.   Pt was then found to be febrile and tachycardic, and was transferred to ICU for sepsis with multifactorial encephalopathy and AHRF. Pts blood cultures grew MRSA and she was initially treated with vancomycin (4/21-4/25 and 5/3-5/8) but continued to have positive blood cultures x5. She was also started on Merrem (4/21-4/23) and subsequently started on Daptomycin on 4/25 with dose increase on 5/1. Plan was to continue the Daptomycin for a 6 week course and Rifampin (started on 5/1) for a 14 day course. Of note, pt had negative blood cx x2 on 5/4/23. Pt was found to be febrile again on 5/8 with a rectal temp of 100.7. Fungitell was ordered and pt was started on Meropenem. Possible suspected source was superficial thrombophlebitis (seen on U/S in the left cephalic vein). ID recommended NM whole body scan/ indium scan and ELICEO to r/o other source of persistent bacteremia, however pt was unable to lie flat given her respiratory status and these studies were not pursued (At the time of transfer, pt remained on dapto, abeba, and rifampine)  Pt hospital course was further complicated by hypercalcemia likely PTH-mediated ISO a L parathyroid adenoma. She was started on IVF and given Alendronate x1 with improvement. Palliative care was also consulted for multimodal pain management for diffuse arthralgias/myalgias possible 2/2 RA vs fibromyalgia     (09 May 2023 23:13)    REVIEW OF SYSTEMS:  Constitutional: [x] negative [ ] fevers [ ] chills [ ] weight loss [ ] weight gain  HEENT: [x] negative [ ] dry eyes [ ] eye irritation [ ] postnasal drip [ ] nasal congestion  CV: [x] negative  [ ] chest pain [ ] orthopnea [ ] palpitations [ ] murmur  Resp: [ ] negative [ ] cough [x] shortness of breath [ ] dyspnea [ ] wheezing [ ] sputum [ ] hemoptysis  GI: [x] negative [ ] nausea [ ] vomiting [ ] diarrhea [ ] constipation [ ] abd pain [ ] dysphagia   : [x] negative [ ] dysuria [ ] nocturia [ ] hematuria [ ] increased urinary frequency  Musculoskeletal: [ ] negative [x] back pain [ ] myalgias [ ] arthralgias [ ] fracture  Skin: [x] negative [ ] rash [ ] itch  Neurological: [x] negative [ ] headache [ ] dizziness [ ] syncope [ ] weakness [ ] numbness  Psychiatric: [x] negative [ ] anxiety [ ] depression  Endocrine: [x negative [ ] diabetes [ ] thyroid problem  Hematologic/Lymphatic: [x negative [ ] anemia [ ] bleeding problem  Allergic/Immunologic: [x]negative [ ] itchy eyes [ ] nasal discharge [ ] hives [ ] angioedema  [x All other systems negative  [ ] Unable to assess ROS because ________      OBJECTIVE:  ICU Vital Signs Last 24 Hrs  T(C): 36.5 (15 May 2023 08:00), Max: 36.8 (15 May 2023 04:00)  T(F): 97.7 (15 May 2023 08:00), Max: 98.2 (15 May 2023 04:00)  HR: 83 (15 May 2023 11:48) (66 - 91)  BP: 84/69 (15 May 2023 07:00) (84/69 - 108/72)  BP(mean): 75 (15 May 2023 07:00) (75 - 84)  ABP: 111/56 (15 May 2023 11:48) (78/44 - 137/73)  ABP(mean): 76 (15 May 2023 11:48) (58 - 95)  RR: 24 (15 May 2023 11:48) (17 - 27)  SpO2: 95% (15 May 2023 11:48) (93% - 100%)    O2 Parameters below as of 15 May 2023 11:48  Patient On (Oxygen Delivery Method): nasal cannula, high flow  O2 Flow (L/min): 60  O2 Concentration (%): 90          05-14 @ 07:01  -  05-15 @ 07:00  --------------------------------------------------------  IN: 665 mL / OUT: 2635 mL / NET: -1970 mL    05-15 @ 07:01  -  05-15 @ 12:09  --------------------------------------------------------  IN: 0 mL / OUT: 300 mL / NET: -300 mL      CAPILLARY BLOOD GLUCOSE      POCT Blood Glucose.: 135 mg/dL (15 May 2023 11:56)    Physical Exam:   Constitutional: no acute distress   HEENT: + PERRLA, EOMI, no drainage or redness  Neck: supple,  No JVD  Respiratory: scattered Rhonchi B/L    Cardiovascular: Regular rate, regular rhythm, normal S1, S2; no murmurs or rub  Gastrointestinal: obese, soft, non-tender, non distended, no hepatosplenomegaly, normal bowel sounds  Extremities: + 2 peripheral edema, no cyanosis, no clubbing   Vascular: Equal and normal pulses: 2+ peripheral pulses throughout  Neurological: alert and oriented   Psychiatric: anxious at times   Musculoskeletal: No joint swelling or deformity; no limitation of extremities, but noted weakness  Skin: warm, dry, well perfused, no rashes, ecchymotic upper extremities     HOSPITAL MEDICATIONS:  Standing Meds:  amitriptyline 10 milliGRAM(s) Oral at bedtime  atorvastatin 20 milliGRAM(s) Oral at bedtime  buMETAnide Injectable 1 milliGRAM(s) IV Push two times a day  chlorhexidine 2% Cloths 1 Application(s) Topical <User Schedule>  cholecalciferol 1000 Unit(s) Oral daily  DAPTOmycin IVPB      DAPTOmycin IVPB 650 milliGRAM(s) IV Intermittent every 24 hours  dextrose 5%. 1000 milliLiter(s) IV Continuous <Continuous>  dextrose 5%. 1000 milliLiter(s) IV Continuous <Continuous>  dextrose 50% Injectable 12.5 Gram(s) IV Push once  dextrose 50% Injectable 25 Gram(s) IV Push once  dextrose 50% Injectable 25 Gram(s) IV Push once  DULoxetine 30 milliGRAM(s) Oral daily  enoxaparin Injectable 90 milliGRAM(s) SubCutaneous every 12 hours  fluticasone propionate 50 MICROgram(s)/spray Nasal Spray 1 Spray(s) Both Nostrils two times a day  glucagon  Injectable 1 milliGRAM(s) IntraMuscular once  hydrocodone/homatropine Syrup 5 milliLiter(s) Oral every 8 hours  insulin glargine Injectable (LANTUS) 14 Unit(s) SubCutaneous at bedtime  insulin lispro (ADMELOG) corrective regimen sliding scale   SubCutaneous three times a day before meals  insulin lispro (ADMELOG) corrective regimen sliding scale   SubCutaneous at bedtime  insulin lispro Injectable (ADMELOG) 7 Unit(s) SubCutaneous three times a day before meals  lactobacillus acidophilus 1 Tablet(s) Oral three times a day with meals  lidocaine   4% Patch 1 Patch Transdermal daily  methylPREDNISolone sodium succinate Injectable 40 milliGRAM(s) IV Push two times a day  metoprolol tartrate 25 milliGRAM(s) Oral three times a day  mupirocin 2% Ointment 1 Application(s) Topical two times a day  pantoprazole    Tablet 40 milliGRAM(s) Oral before breakfast  sildenafil (REVATIO) 20 milliGRAM(s) Oral every 8 hours  sodium chloride 0.65% Nasal 1 Spray(s) Both Nostrils two times a day  trimethoprim  40 mG/sulfamethoxazole 200 mG Suspension 400 milliGRAM(s) Oral every 8 hours      PRN Meds:  albuterol/ipratropium for Nebulization 3 milliLiter(s) Nebulizer every 6 hours PRN  bisacodyl Suppository 10 milliGRAM(s) Rectal daily PRN  dextrose Oral Gel 15 Gram(s) Oral once PRN  guaiFENesin Oral Liquid (Sugar-Free) 200 milliGRAM(s) Oral every 6 hours PRN      LABS:                        7.8    10.48 )-----------( 262      ( 15 May 2023 08:11 )             25.9     Hgb Trend: 7.8<--, 7.5<--, 8.3<--, 8.2<--, 8.4<--  05-15    134<L>  |  95<L>  |  35<H>  ----------------------------<  193<H>  4.3   |  28  |  1.00    Ca    9.2      15 May 2023 08:11  Phos  3.2     05-15  Mg     1.80     05-15    TPro  6.0  /  Alb  2.4<L>  /  TBili  0.3  /  DBili  x   /  AST  19  /  ALT  15  /  AlkPhos  136<H>  05-15    Creatinine Trend: 1.00<--, 1.00<--, 0.92<--, 1.06<--, 1.14<--, 0.96<--      Arterial Blood Gas:  05-15 @ 08:11  7.47/42/204/31/--/6.3  ABG lactate: --  Arterial Blood Gas:  05-15 @ 01:30  7.43/50/109/33/96.7/8.0  ABG lactate: --  Arterial Blood Gas:  05-14 @ 17:20  7.43/46/113/30/97.6/5.6  ABG lactate: --  Arterial Blood Gas:  05-14 @ 09:00  7.40/48/83/30/--/4.3  ABG lactate: --  Arterial Blood Gas:  05-14 @ 00:10  7.40/50/89/31/97.3/5.5  ABG lactate: --  Arterial Blood Gas:  05-13 @ 16:32  7.45/49/106/34/97.3/9.0  ABG lactate: --           Interval Events:   -hemoptysis overnight       HPI:  62 year old female with a PMHx of ILD on 3.5L home O2, ALMA on CPAP and chronic prednisone, pulmonary HTN, T2DM, RA, psoriatic arthritis who initially present to [facility] for worsening dyspnea/ hypoxia requiring HFCA and BIPAP and was transferred to [facility] further management She has had multiple recent hospitalizations for acute on chronic hypoxic respiratory failure 2/2 IDL flair/ PNA. During her hospitalization she had a rapid response called (on 4/21) for left facial droop and AMS c/f possible stroke. Imagining at the time demonstrated high grade L sided carotid stenosis for which pt and family decided not to pursue surgical intervention.   Pt was then found to be febrile and tachycardic, and was transferred to ICU for sepsis with multifactorial encephalopathy and AHRF. Pts blood cultures grew MRSA and she was initially treated with vancomycin (4/21-4/25 and 5/3-5/8) but continued to have positive blood cultures x5. She was also started on Merrem (4/21-4/23) and subsequently started on Daptomycin on 4/25 with dose increase on 5/1. Plan was to continue the Daptomycin for a 6 week course and Rifampin (started on 5/1) for a 14 day course. Of note, pt had negative blood cx x2 on 5/4/23. Pt was found to be febrile again on 5/8 with a rectal temp of 100.7. Fungitell was ordered and pt was started on Meropenem. Possible suspected source was superficial thrombophlebitis (seen on U/S in the left cephalic vein). ID recommended NM whole body scan/ indium scan and ELICEO to r/o other source of persistent bacteremia, however pt was unable to lie flat given her respiratory status and these studies were not pursued (At the time of transfer, pt remained on dapto, abeba, and rifampine)  Pt hospital course was further complicated by hypercalcemia likely PTH-mediated ISO a L parathyroid adenoma. She was started on IVF and given Alendronate x1 with improvement. Palliative care was also consulted for multimodal pain management for diffuse arthralgias/myalgias possible 2/2 RA vs fibromyalgia     (09 May 2023 23:13)    REVIEW OF SYSTEMS:  Constitutional: [x] negative [ ] fevers [ ] chills [ ] weight loss [ ] weight gain  HEENT: [x] negative [ ] dry eyes [ ] eye irritation [ ] postnasal drip [ ] nasal congestion  CV: [x] negative  [ ] chest pain [ ] orthopnea [ ] palpitations [ ] murmur  Resp: [ ] negative [ ] cough [x] shortness of breath [ ] dyspnea [ ] wheezing [ ] sputum [ ] hemoptysis  GI: [x] negative [ ] nausea [ ] vomiting [ ] diarrhea [ ] constipation [ ] abd pain [ ] dysphagia   : [x] negative [ ] dysuria [ ] nocturia [ ] hematuria [ ] increased urinary frequency  Musculoskeletal: [ ] negative [x] back pain [ ] myalgias [ ] arthralgias [ ] fracture  Skin: [x] negative [ ] rash [ ] itch  Neurological: [x] negative [ ] headache [ ] dizziness [ ] syncope [ ] weakness [ ] numbness  Psychiatric: [x] negative [ ] anxiety [ ] depression  Endocrine: [x negative [ ] diabetes [ ] thyroid problem  Hematologic/Lymphatic: [x negative [ ] anemia [ ] bleeding problem  Allergic/Immunologic: [x]negative [ ] itchy eyes [ ] nasal discharge [ ] hives [ ] angioedema  [x All other systems negative  [ ] Unable to assess ROS because ________      OBJECTIVE:  ICU Vital Signs Last 24 Hrs  T(C): 36.5 (15 May 2023 08:00), Max: 36.8 (15 May 2023 04:00)  T(F): 97.7 (15 May 2023 08:00), Max: 98.2 (15 May 2023 04:00)  HR: 83 (15 May 2023 11:48) (66 - 91)  BP: 84/69 (15 May 2023 07:00) (84/69 - 108/72)  BP(mean): 75 (15 May 2023 07:00) (75 - 84)  ABP: 111/56 (15 May 2023 11:48) (78/44 - 137/73)  ABP(mean): 76 (15 May 2023 11:48) (58 - 95)  RR: 24 (15 May 2023 11:48) (17 - 27)  SpO2: 95% (15 May 2023 11:48) (93% - 100%)    O2 Parameters below as of 15 May 2023 11:48  Patient On (Oxygen Delivery Method): nasal cannula, high flow  O2 Flow (L/min): 60  O2 Concentration (%): 90          05-14 @ 07:01  -  05-15 @ 07:00  --------------------------------------------------------  IN: 665 mL / OUT: 2635 mL / NET: -1970 mL    05-15 @ 07:01  -  05-15 @ 12:09  --------------------------------------------------------  IN: 0 mL / OUT: 300 mL / NET: -300 mL      CAPILLARY BLOOD GLUCOSE      POCT Blood Glucose.: 135 mg/dL (15 May 2023 11:56)    Physical Exam:   Constitutional: no acute distress   HEENT: + PERRLA, EOMI, no drainage or redness  Neck: supple,  No JVD  Respiratory: scattered Rhonchi B/L    Cardiovascular: Regular rate, regular rhythm, normal S1, S2; no murmurs or rub  Gastrointestinal: obese, soft, non-tender, non distended, no hepatosplenomegaly, normal bowel sounds  Extremities: + 2 peripheral edema, no cyanosis, no clubbing   Vascular: Equal and normal pulses: 2+ peripheral pulses throughout  Neurological: alert and oriented   Psychiatric: anxious at times   Musculoskeletal: No joint swelling or deformity; no limitation of extremities, but noted weakness  Skin: warm, dry, well perfused, no rashes, ecchymotic upper extremities     HOSPITAL MEDICATIONS:  Standing Meds:  amitriptyline 10 milliGRAM(s) Oral at bedtime  atorvastatin 20 milliGRAM(s) Oral at bedtime  buMETAnide Injectable 1 milliGRAM(s) IV Push two times a day  chlorhexidine 2% Cloths 1 Application(s) Topical <User Schedule>  cholecalciferol 1000 Unit(s) Oral daily  DAPTOmycin IVPB      DAPTOmycin IVPB 650 milliGRAM(s) IV Intermittent every 24 hours  dextrose 5%. 1000 milliLiter(s) IV Continuous <Continuous>  dextrose 5%. 1000 milliLiter(s) IV Continuous <Continuous>  dextrose 50% Injectable 12.5 Gram(s) IV Push once  dextrose 50% Injectable 25 Gram(s) IV Push once  dextrose 50% Injectable 25 Gram(s) IV Push once  DULoxetine 30 milliGRAM(s) Oral daily  enoxaparin Injectable 90 milliGRAM(s) SubCutaneous every 12 hours  fluticasone propionate 50 MICROgram(s)/spray Nasal Spray 1 Spray(s) Both Nostrils two times a day  glucagon  Injectable 1 milliGRAM(s) IntraMuscular once  hydrocodone/homatropine Syrup 5 milliLiter(s) Oral every 8 hours  insulin glargine Injectable (LANTUS) 14 Unit(s) SubCutaneous at bedtime  insulin lispro (ADMELOG) corrective regimen sliding scale   SubCutaneous three times a day before meals  insulin lispro (ADMELOG) corrective regimen sliding scale   SubCutaneous at bedtime  insulin lispro Injectable (ADMELOG) 7 Unit(s) SubCutaneous three times a day before meals  lactobacillus acidophilus 1 Tablet(s) Oral three times a day with meals  lidocaine   4% Patch 1 Patch Transdermal daily  methylPREDNISolone sodium succinate Injectable 40 milliGRAM(s) IV Push two times a day  metoprolol tartrate 25 milliGRAM(s) Oral three times a day  mupirocin 2% Ointment 1 Application(s) Topical two times a day  pantoprazole    Tablet 40 milliGRAM(s) Oral before breakfast  sildenafil (REVATIO) 20 milliGRAM(s) Oral every 8 hours  sodium chloride 0.65% Nasal 1 Spray(s) Both Nostrils two times a day  trimethoprim  40 mG/sulfamethoxazole 200 mG Suspension 400 milliGRAM(s) Oral every 8 hours      PRN Meds:  albuterol/ipratropium for Nebulization 3 milliLiter(s) Nebulizer every 6 hours PRN  bisacodyl Suppository 10 milliGRAM(s) Rectal daily PRN  dextrose Oral Gel 15 Gram(s) Oral once PRN  guaiFENesin Oral Liquid (Sugar-Free) 200 milliGRAM(s) Oral every 6 hours PRN      LABS:                        7.8    10.48 )-----------( 262      ( 15 May 2023 08:11 )             25.9     Hgb Trend: 7.8<--, 7.5<--, 8.3<--, 8.2<--, 8.4<--  05-15    134<L>  |  95<L>  |  35<H>  ----------------------------<  193<H>  4.3   |  28  |  1.00    Ca    9.2      15 May 2023 08:11  Phos  3.2     05-15  Mg     1.80     05-15    TPro  6.0  /  Alb  2.4<L>  /  TBili  0.3  /  DBili  x   /  AST  19  /  ALT  15  /  AlkPhos  136<H>  05-15    Creatinine Trend: 1.00<--, 1.00<--, 0.92<--, 1.06<--, 1.14<--, 0.96<--      Arterial Blood Gas:  05-15 @ 08:11  7.47/42/204/31/--/6.3  ABG lactate: --  Arterial Blood Gas:  05-15 @ 01:30  7.43/50/109/33/96.7/8.0  ABG lactate: --  Arterial Blood Gas:  05-14 @ 17:20  7.43/46/113/30/97.6/5.6  ABG lactate: --  Arterial Blood Gas:  05-14 @ 09:00  7.40/48/83/30/--/4.3  ABG lactate: --  Arterial Blood Gas:  05-14 @ 00:10  7.40/50/89/31/97.3/5.5  ABG lactate: --  Arterial Blood Gas:  05-13 @ 16:32  7.45/49/106/34/97.3/9.0  ABG lactate: --

## 2023-05-15 NOTE — PROGRESS NOTE ADULT - SUBJECTIVE AND OBJECTIVE BOX
Follow Up:  MRSA bacteremia outside hospital     Interval History/ROS:  remains on HFNC in MICU        Allergies  penicillins (Other)        ANTIMICROBIALS:  DAPTOmycin IVPB    DAPTOmycin IVPB 650 every 24 hours  trimethoprim  40 mG/sulfamethoxazole 200 mG Suspension 400 every 8 hours    MEDICATIONS  (STANDING):  albuterol/ipratropium for Nebulization 3 every 6 hours PRN  amitriptyline 10 at bedtime  atorvastatin 20 at bedtime  bisacodyl Suppository 10 daily PRN  buMETAnide Injectable 1 two times a day  dextrose 50% Injectable 12.5 once  dextrose 50% Injectable 25 once  dextrose 50% Injectable 25 once  dextrose Oral Gel 15 once PRN  DULoxetine 30 daily  enoxaparin Injectable 90 every 12 hours  glucagon  Injectable 1 once  guaiFENesin Oral Liquid (Sugar-Free) 200 every 6 hours PRN  hydrocodone/homatropine Syrup 5 every 8 hours  insulin glargine Injectable (LANTUS) 14 at bedtime  insulin lispro (ADMELOG) corrective regimen sliding scale  at bedtime  insulin lispro (ADMELOG) corrective regimen sliding scale  three times a day before meals  insulin lispro Injectable (ADMELOG) 7 three times a day before meals  methylPREDNISolone sodium succinate Injectable 40 two times a day  metoprolol tartrate 25 three times a day  pantoprazole    Tablet 40 before breakfast  sildenafil (REVATIO) 20 every 8 hours    Vital Signs Last 24 Hrs  T(F): 97.7 (05-15-23 @ 08:00), Max: 98.2 (05-15-23 @ 04:00)  HR: 75 (05-15-23 @ 16:00)  BP: 84/69 (05-15-23 @ 07:00)  RR: 17 (05-15-23 @ 16:00)  SpO2: 100% (05-15-23 @ 16:00) (93% - 100%)    PHYSICAL EXAM:  Constitutional: alert, awake, HFNC  RS: few crackles   CVS: S1, S2   Abdomen: Soft. No guarding/rigidity/tenderness.  Extremities: ecchymosis right arm     Vascular: iv arms bilat  Neuro: Alert, oriented to time/place/person  Cranial nerves 2-12 grossly normal. No focal abnormalities                          7.8    10.48 )-----------( 262      ( 15 May 2023 08:11 )             25.9 05-15    134  |  95  |  35  ----------------------------<  193  4.3   |  28  |  1.00  Ca    9.2      15 May 2023 08:11Phos  3.2     05-15Mg     1.80     05-15  TPro  6.0  /  Alb  2.4  /  TBili  0.3  /  DBili  x   /  AST  19  /  ALT  15  /  AlkPhos  136  05-15      ckCreatine Kinase, Serum: 22 U/L (05.11.23 @ 02:15)     MICROBIOLOGY:    .Blood Blood-Peripheral  05-09-23   No growth to date.  --  --      .Blood Blood-Peripheral  05-09-23   No growth to date.  --  --        Rapid RVP Result: NotDete (05-10 @ 06:36)    Fungitell: 246:     RADIOLOGY:    r< from: Xray Chest 1 View- PORTABLE-Urgent (Xray Chest 1 View- PORTABLE-Urgent .) (05.09.23 @ 23:43) >    ACC: [id number] EXAM:  XR CHEST PORTABLE URGENT 1V   ORDERED BY: VERO RUFF     PROCEDURE DATE:  05/09/2023          INTERPRETATION:  CLINICAL INFORMATION: Hypoxia    TIME OF EXAMINATION: May 9 at 11:34 PM    EXAM: Portable chest    FINDINGS:  Low lung volumes with increased markings consistent with interstitial   lung disease considering past studies including chest CT.    No new focal consolidation to indicate pneumonia.    Heart size is stable. No effusions or pneumothorax        COMPARISON: May 8        IMPRESSION: Follow-up with diffuse opacities consistent with interstitial   lung disease.    --- End of Report ---            DARNELL VILLALPANDO MD; Attending Radiologist  This document has been electronically signed. May 10 2023  9:14AM    < end of copied text >

## 2023-05-16 NOTE — CHART NOTE - NSCHARTNOTEFT_GEN_A_CORE
: Vera Renee Dr. Zaidi     INDICATION: r/o DVT     PROCEDURE:    [x ] LIMITED DVT      FINDINGS/INTERPRETATION:    All veins/arteries compressible     Images uploaded to [employer] : Vera Renee Dr. Zaidi     INDICATION: r/o DVT     PROCEDURE:    [x ] LIMITED DVT      FINDINGS/INTERPRETATION:    All veins/arteries compressible. No evidence of DVT following 5 point CUS    Images uploaded to [facility]

## 2023-05-16 NOTE — PROGRESS NOTE ADULT - ASSESSMENT
This is a 63 y/o F with PMH of DM2, HTN, HLD, obesity, non-obstructive CAD, ALMA on CPAP, and ILD thought secondary to prior Methotrexate use, RA and Psoriatic arthritis who was transferred from [facility] on 5/9 for acute hypoxemic respiratory failure in the setting of recent MRSA bacteremia and suspected ILD exacerbation    Neuro  Alerted and oriented x4 at baseline, hx of pain RRT on 4/21 at [facility] for left facial droop, found to have left sided carotid stenosis only   -currently alert and oriented   -Palliative care was consulted at OSH for multimodal pain management for diffuse arthralgias/myalgias possible 2/2 RA vs fibromyalgia    -c/w Cymbalta, amitriptyline, lidocaine patch, naproxen, tylenol   -PT following  -encourage OOB to chair if tolerated, having difficulties due to low SPO2 and tachypnea    Pulmonary    Hx of past smoker, ALMA on CPAP, ILD 2/2 ?methotrexate use, pulmonary HTN (remotely on sildenafil stopped ~1 month ago)  who presented to [facility] on 4/10 for acute hypoxemic respiratory failure was on 40L/70% at [facility] alternating with BIPAP 12/5 70%, episodes of severe hypoxia and tachypnea when lying flat (~55-70%) started on NO on 5/10 with mild improvement.   -noted some hemoptysis this AM, will start hycodan, flonase, and saline nasal spray and monitor closely     - currently on NO 40PPM plan to come down to a goal of 20PPM as tolerated in the next 24 hours   - Continue HFNC 60L/ titrating fio2 as needed - currently on 100% will taper down as tolerated  - sildenafil increased from 10mg PO q8 hours to 20mg TID, may need to increase if no improvement  - methemoglobin today 0.9, continue to trend as needed while on NO   - cant use nocturnal Bipap d/t NO - no significant hypercapnia on ABG  - Bronchodilators prn   - Continue steroids for ILD currently on solumedrol 40mg Q8h will change to BID today   - Fungitell elevated to 246 - started on PCP treatment with Bactrim - transitioned to PO to decrease fluid load  - follows Dr. Kim at [facility] who has reportedly been caring for patient  - will eventually obtain CTPA as patient has been hospitalized on and off since early April - presently unable to lie flat therefore continue therapeutic AC if no contraindication    Cardiac   Hx of HTN and nonobstructive CAD, reported diastolic dysfunction, and pulmonary hypertension (last left/right sided cath at [facility] 12/2022)  - continue statin   - c/w metoprolol tartrate 25mg TID  - holding nifedipine given normotension   - official ECHO 5/10 with Normal LV systolic function, RV enlargement with decreased RV systolic function and severe tricuspid regurgitation. Estimated pulmonary artery systolic pressure equals 96 mm,   evidence of severe pulmonary hypertension.  - pro-BNP 60960 on 5/10  - c/w aggressive diuresis with Bumex and continue to monitor electrolytes    GI  No acute issues  -tolerating diet - regular consistent carb  -Was reportedly on PPI at home will continue while on steroids   -active BM    /Renal  Creat on admission 0.5  -now uptrending 0.85, SHAAN likely medication induced (bactrim)  -continue diuresis with Bumex 1mg BID to keep net negative 1L  -replete electrolytes and trend Q8h while diuresing   -muñiz in place with good UO       Endocrine   DM2 A1c 7.1, hyperglycemia on steroids, reported to have hypercalcemia 2/2 left parathyroid adenoma s/p Alendronate x1   - continue sliding scale and increase premeal 5 units to 7 units, increase as needed - continue Lantus to 14 units  - prednisone 40mg daily changed to solumedrol 40mg Q8h on 5/10 for ILD treatment but has shown minimal improvement  -will change steroids to solumedrol 40mg BID as part of PCP treatment   - s/p Alendronate x1 with resolved hypercalcemia will continue to monitor    - Thyroid nodule noted on imaging - will need outpatient follow-up    Rheumatology   Hx of RA/psoriatic arthritis diagnosed ~2016, now seronegative, follows with Dr. Rush outpatient, failed multiple modalities (methotrexate, humira, xeljanz and rituximab due to insurance issues) recently on leflunomide.   -OSH records showed patient was negative  Kassi-1, SS-B, SS-A, RNP, SIRISHA, and RF   -repeated RF, SIRISHA, Double stranded DNA, neutrophil cytoplasmic antibody, myomarker panel   -Now with negative RF - per Dr. Rush was previously seropositive    Infectious Disease   MRSA bacteremia at [facility]. Cultures grew MRSA and was initially treated with vancomycin (4/21-4/25 and 5/3-5/8) but continued to have positive blood cultures x5 and subsequently started on Daptomycin on 4/25 with dose increase on 5/1. As per OSH records, the plan was to continue the Daptomycin for a 6 week course and Rifampin (started on 5/1) for a 14 day course.  - continue current antibiotics: 6 week course of Daptomycin for MRSA bactermia (5/1-   ) and Bactrim PCP (5/11- )  for 21 days given increased LDH and Fungitell  - s/p empiric Meropenem (5/8-5/14 )  - CK 22   - f/u repeat blood cultures here   - RVP negative  - LDH elevated to 743   - Pending sputum PCP PRC if able.  - ID following      Heme/DVT PPX  Anemia   - Required PRBC transfusion 5/11 - has been stable since then  -c/w full dose lovenox SQ for now - and monitor Hb  -FOBT positive but stool is brown. Will monitor  -Ecchymoses noted over RUE - has good pulses. Will outline and monitor for change.  -No signs of hemolysis  -will f/u iron studies       GOC:   -Full Code - MOLST from [facility] confirmed with patient  -HCP filled out with daughter Joyce Alicea as primary and secondary is her sister Emi Sánchez   -D/w Patient and  at bedside daily    This is a 63 y/o F with PMH of DM2, HTN, HLD, obesity, non-obstructive CAD, ALMA on CPAP, and ILD thought secondary to prior Methotrexate use, RA and Psoriatic arthritis who was transferred from [place] on 5/9 for acute hypoxemic respiratory failure in the setting of recent MRSA bacteremia and suspected ILD exacerbation    Neuro  Alerted and oriented x4 at baseline, hx of pain RRT on 4/21 at [place] for left facial droop, found to have left sided carotid stenosis only   -currently alert and oriented   -Palliative care was consulted at OSH for multimodal pain management for diffuse arthralgias/myalgias possible 2/2 RA vs fibromyalgia    -c/w Cymbalta, amitriptyline, lidocaine patch, naproxen, tylenol   -PT following  -encourage OOB to chair if tolerated, having difficulties due to low SPO2 and tachypnea    Pulmonary    Hx of past smoker, ALMA on CPAP, ILD 2/2 ?methotrexate use, pulmonary HTN (remotely on sildenafil stopped ~1 month ago)  who presented to [place] on 4/10 for acute hypoxemic respiratory failure was on 40L/70% at [place] alternating with BIPAP 12/5 70%, episodes of severe hypoxia and tachypnea when lying flat (~55-70%) started on NO on 5/10 with mild improvement.   -noted some hemoptysis this AM, will start hycodan, flonase, and saline nasal spray and monitor closely     - currently on NO 25PPM plan to come down to a goal of 20PPM as tolerated   - Continue HFNC 60L/ titrating fio2 as needed - currently on 70% will taper down as tolerated  - sildenafil increased from 10mg PO q8 hours to 20mg TID, may need to increase if no improvement  - methemoglobin today 0.2, continue to trend as needed while on NO   - cant use nocturnal Bipap d/t NO - no significant hypercapnia on ABG  - Bronchodilators prn   - Continue steroids currently on solumedrol 40mg BID   - Fungitell elevated to 246 - started on PCP treatment with Bactrim - transitioned to PO to decrease fluid load  - follows Dr. Kim at [facility] who has reportedly been caring for patient  - will eventually obtain CTPA as patient has been hospitalized on and off since early April - presently unable to lie flat     Cardiac   Hx of HTN and nonobstructive CAD, reported diastolic dysfunction, and pulmonary hypertension (last left/right sided cath at [facility] 12/2022)  - continue statin   - c/w metoprolol tartrate 25mg TID  - holding nifedipine given normotension   - official ECHO 5/10 with Normal LV systolic function, RV enlargement with decreased RV systolic function and severe tricuspid regurgitation. Estimated pulmonary artery systolic pressure equals 96 mm,   evidence of severe pulmonary hypertension.  - pro-BNP 40490 on 5/10  - c/w diuresis with Bumex and continue to monitor electrolytes    GI  Melena overnight   -tolerating diet - regular consistent carb  -PPI BID for now   -active BM    /Renal  Creat on admission 0.5  -now uptrending to 1.36, SHAAN likely medication induced (bactrim)   -continue diuresis with Bumex 1mg BID to keep net negative 1L  -replete electrolytes and trend Q8h while diuresing   -muñiz in place with good UO       Endocrine   DM2 A1c 7.1, hyperglycemia on steroids, reported to have hypercalcemia 2/2 left parathyroid adenoma s/p Alendronate x1   - continue sliding scale and increase premeal 5 units to 7 units, increase as needed - continue Lantus to 14 units  -currently on solumedrol 40mg BID as part of PCP treatment   - prednisone 40mg daily changed to solumedrol 40mg Q8h on 5/10 for ILD treatment but has shown minimal improvement   - s/p Alendronate x1 with resolved hypercalcemia will continue to monitor    - Thyroid nodule noted on imaging - will need outpatient follow-up    Rheumatology   Hx of RA/psoriatic arthritis diagnosed ~2016, now seronegative, follows with Dr. Rush outpatient, failed multiple modalities (methotrexate, humira, xeljanz and rituximab due to insurance issues) recently on leflunomide.   -OSH records showed patient was negative  Kassi-1, SS-B, SS-A, RNP, SIRISHA, and RF   -repeated RF, SIRISHA, Double stranded DNA, neutrophil cytoplasmic antibody, myomarker panel   -Now with negative RF - per Dr. Rush was previously seropositive    Infectious Disease   MRSA bacteremia at [place]. Cultures grew MRSA and was initially treated with vancomycin (4/21-4/25 and 5/3-5/8) but continued to have positive blood cultures x5 and subsequently started on Daptomycin on 4/25 with dose increase on 5/1. As per OSH records, the plan was to continue the Daptomycin for a 6 week course and Rifampin (started on 5/1) for a 14 day course.  - continue current antibiotics: 6 week course of Daptomycin for MRSA bacteremia (5/1-   ) and Bactrim PCP (5/11- )  for 21 days given increased LDH and Fungitell  - s/p empiric Meropenem (5/8-5/14 )  - CK 22   - f/u repeat blood cultures here   - RVP negative  - LDH elevated to 743   - Pending sputum PCP PRC if able.  - ID following    Heme/DVT PPX  Anemia   - Required PRBC transfusion 5/11 now drop in Hgb overnigt requiring 1u PRBC   -holding full dose lovenox SQ for now and will start heparin 5000unit SQ for DVT ppx   -FOBT positive but stool is brown. Will monitor  -Ecchymoses noted over RUE - has good pulses. Will outline and monitor for change.  -No signs of hemolysis  -will f/u iron studies       GOC:   -Full Code - MOLST from [facility] confirmed with patient  -HCP filled out with daughter Joyce Alicea as primary and secondary is her sister Emi Garciard   -D/w Patient and  at bedside daily    This is a 61 y/o F with PMH of DM2, HTN, HLD, obesity, non-obstructive CAD, ALMA on CPAP, and ILD thought secondary to prior Methotrexate use, RA and Psoriatic arthritis who was transferred from [facility] on 5/9 for acute hypoxemic respiratory failure in the setting of recent MRSA bacteremia and suspected ILD exacerbation    Neuro  Alerted and oriented x4 at baseline, hx of pain RRT on 4/21 at [facility] for left facial droop, found to have left sided carotid stenosis only   -currently alert and oriented   -Palliative care was consulted at OSH for multimodal pain management for diffuse arthralgias/myalgias possible 2/2 RA vs fibromyalgia    -c/w Cymbalta, amitriptyline, lidocaine patch, naproxen, tylenol   -PT following  -encourage OOB to chair if tolerated, having difficulties due to low SPO2 and tachypnea    Pulmonary    Hx of past smoker, ALMA on CPAP, ILD 2/2 ?methotrexate use, pulmonary HTN (remotely on sildenafil stopped ~1 month ago)  who presented to [facility] on 4/10 for acute hypoxemic respiratory failure was on 40L/70% at [facility] alternating with BIPAP 12/5 70%, episodes of severe hypoxia and tachypnea when lying flat (~55-70%) started on NO on 5/10 with mild improvement.   -noted some hemoptysis this AM, will start hycodan, flonase, and saline nasal spray and monitor closely     - currently on NO 25PPM plan to come down to a goal of 20PPM as tolerated   - Continue HFNC 60L/ titrating fio2 as needed - currently on 70% will taper down as tolerated  - sildenafil increased from 10mg PO q8 hours to 20mg TID, may need to increase if no improvement  - methemoglobin today 0.2, continue to trend as needed while on NO   - cant use nocturnal Bipap d/t NO - no significant hypercapnia on ABG  - Bronchodilators prn   - Continue steroids currently on solumedrol 40mg BID   - Fungitell elevated to 246 - started on PCP treatment with Bactrim - transitioned to PO to decrease fluid load  - follows Dr. Kim at [facility] who has reportedly been caring for patient  - will eventually obtain CTPA as patient has been hospitalized on and off since early April - presently unable to lie flat  -consulted lung transplant team, is not considered a transplant candidate at this time      Cardiac   Hx of HTN and nonobstructive CAD, reported diastolic dysfunction, and pulmonary hypertension (last left/right sided cath at [facility] 12/2022)  - continue statin   - c/w metoprolol tartrate 25mg TID  - holding nifedipine given normotension   - official ECHO 5/10 with Normal LV systolic function, RV enlargement with decreased RV systolic function and severe tricuspid regurgitation. Estimated pulmonary artery systolic pressure equals 96 mm,   evidence of severe pulmonary hypertension.  - pro-BNP 11205 on 5/10  - c/w diuresis with Bumex and continue to monitor electrolytes    GI  Melena overnight   -tolerating diet - regular consistent carb  -PPI BID for now   -active BM    /Renal  Creat on admission 0.5  -now uptrending to 1.36, SHAAN likely medication induced (bactrim)   -continue diuresis with Bumex 1mg BID to keep net negative 1L  -replete electrolytes and trend Q8h while diuresing   -muñiz in place with good UO       Endocrine   DM2 A1c 7.1, hyperglycemia on steroids, reported to have hypercalcemia 2/2 left parathyroid adenoma s/p Alendronate x1   - continue sliding scale and increase premeal 5 units to 7 units, increase as needed - continue Lantus to 14 units  -currently on solumedrol 40mg BID as part of PCP treatment   - prednisone 40mg daily changed to solumedrol 40mg Q8h on 5/10 for ILD treatment but has shown minimal improvement   - s/p Alendronate x1 with resolved hypercalcemia will continue to monitor    - Thyroid nodule noted on imaging - will need outpatient follow-up    Rheumatology   Hx of RA/psoriatic arthritis diagnosed ~2016, now seronegative, follows with Dr. Rush outpatient, failed multiple modalities (methotrexate, humira, xeljanz and rituximab due to insurance issues) recently on leflunomide.   -OSH records showed patient was negative  Kassi-1, SS-B, SS-A, RNP, SIRISHA, and RF   -repeated RF, SIRISHA, Double stranded DNA, neutrophil cytoplasmic antibody, myomarker panel   -Now with negative RF - per Dr. Rush was previously seropositive    Infectious Disease   MRSA bacteremia at [facility]. Cultures grew MRSA and was initially treated with vancomycin (4/21-4/25 and 5/3-5/8) but continued to have positive blood cultures x5 and subsequently started on Daptomycin on 4/25 with dose increase on 5/1. As per OSH records, the plan was to continue the Daptomycin for a 6 week course and Rifampin (started on 5/1) for a 14 day course.  - continue current antibiotics: 6 week course of Daptomycin for MRSA bacteremia (5/1-   ) and Bactrim PCP (5/11- )  for 21 days given increased LDH and Fungitell  - s/p empiric Meropenem (5/8-5/14 )  - CK 22   - f/u repeat blood cultures here   - RVP negative  - LDH elevated to 743   - Pending sputum PCP PRC if able.  - ID following    Heme/DVT PPX  Anemia   - Required PRBC transfusion 5/11 now drop in Hgb overnigt requiring 1u PRBC   -holding full dose lovenox SQ for now and will start heparin 5000unit SQ for DVT ppx   -FOBT positive but stool is brown. Will monitor  -Ecchymoses noted over RUE - has good pulses. Will outline and monitor for change.  -No signs of hemolysis  -will f/u iron studies       GOC:   -Full Code - MOLST from [facility] confirmed with patient  -HCP filled out with daughter Joyce Alicea as primary and secondary is her sister Emi Sánchez   -D/w Patient and  at bedside daily

## 2023-05-16 NOTE — PROGRESS NOTE ADULT - NS ATTEND AMEND GEN_ALL_CORE FT
Pt seen and examined. 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 Hb dropped to 6.8 in the setting of dark stool. Hold AC, start Protonix BID, receiving 1 unit PRBC, follow up post transfusion CBC. Awaiting b/l LE dopplers. HFNC titrated down to a FiO2 of 70 % and Angie now down to 25 ppm. Cont to titrate down as tolerated to keep O2 sat > 88 %. Cont Sildenafil TID. Remains on Bactrim for PJP pneumonia and solumedrol to 40 mg IV BID. Cr rising possibly 2/2 diuresis vs Bactrim. Morning diuretic held, follow repeat BMP this evening, if Cr continues to rise will hold diuresis and transition from Bactrim to Mepron. Cont Daptomycin IV for MRSA bacteremia, BCxs from 5/9 NGTD. Unable to get ELICEO to r/o endocarditis d/t resp instability. Overall prognosis extremely guarded. Not a transplant candidate per discussion with transplant team. Ongoing GOC discussion, remains full code. Patient and  in detail updated at bedside.

## 2023-05-16 NOTE — PROGRESS NOTE ADULT - SUBJECTIVE AND OBJECTIVE BOX
Interval Events:   -?melena   -Hgb dropped was given 1u PRBC       HPI:  62 year old female with a PMHx of IDL on 3.5L home O2, ALMA on CPAP and chronic prednisone, pulmonary HTN, T2DM, RA, psoriatic arthritis who initially present to [facility] for worsening dyspnea/ hypoxia requiring HFCA and BIPAP and was transferred to [facility] further management She has had multiple recent hospitalizations for acute on chronic hypoxic respiratory failure 2/2 IDL flair/ PNA. During her hospitalization she had a rapid response called (on 4/21) for left facial droop and AMS c/f possible stroke. Imagining at the time demonstrated high grade L sided carotid stenosis for which pt and family decided not to pursue surgical intervention.   Pt was then found to be febrile and tachycardic, and was transferred to ICU for sepsis with multifactorial encephalopathy and AHRF. Pts blood cultures grew MRSA and she was initially treated with vancomycin (4/21-4/25 and 5/3-5/8) but continued to have positive blood cultures x5. She was also started on Merrem (4/21-4/23) and subsequently started on Daptomycin on 4/25 with dose increase on 5/1. Plan was to continue the Daptomycin for a 6 week course and Rifampin (started on 5/1) for a 14 day course. Of note, pt had negative blood cx x2 on 5/4/23. Pt was found to be febrile again on 5/8 with a rectal temp of 100.7. Fungitell was ordered and pt was started on Meropenem. Possible suspected source was superficial thrombophlebitis (seen on U/S in the left cephalic vein). ID recommended NM whole body scan/ indium scan and ELICEO to r/o other source of persistent bacteremia, however pt was unable to lie flat given her respiratory status and these studies were not pursued (At the time of transfer, pt remained on dapto, abeba, and rifampine)  Pt hospital course was further complicated by hypercalcemia likely PTH-mediated ISO a L parathyroid adenoma. She was started on IVF and given Alendronate x1 with improvement. Palliative care was also consulted for multimodal pain management for diffuse arthralgias/myalgias possible 2/2 RA vs fibromyalgia     (09 May 2023 23:13)    REVIEW OF SYSTEMS:  Constitutional: [x] negative [ ] fevers [ ] chills [ ] weight loss [ ] weight gain  HEENT: [x] negative [ ] dry eyes [ ] eye irritation [ ] postnasal drip [ ] nasal congestion  CV: [x] negative  [ ] chest pain [ ] orthopnea [ ] palpitations [ ] murmur  Resp: [ ] negative [ ] cough [x] shortness of breath [ ] dyspnea [ ] wheezing [ ] sputum [ ] hemoptysis  GI: [x] negative [ ] nausea [ ] vomiting [ ] diarrhea [ ] constipation [ ] abd pain [ ] dysphagia   : [x] negative [ ] dysuria [ ] nocturia [ ] hematuria [ ] increased urinary frequency  Musculoskeletal: [ ] negative [x] back pain [ ] myalgias [ ] arthralgias [ ] fracture  Skin: [x] negative [ ] rash [ ] itch  Neurological: [x] negative [ ] headache [ ] dizziness [ ] syncope [ ] weakness [ ] numbness  Psychiatric: [x] negative [ ] anxiety [ ] depression  Endocrine: [x negative [ ] diabetes [ ] thyroid problem  Hematologic/Lymphatic: [x negative [ ] anemia [ ] bleeding problem  Allergic/Immunologic: [x]negative [ ] itchy eyes [ ] nasal discharge [ ] hives [ ] angioedema  [x All other systems negative  [ ] Unable to assess ROS because ________        OBJECTIVE:  ICU Vital Signs Last 24 Hrs  T(C): 36.7 (16 May 2023 04:00), Max: 36.7 (16 May 2023 04:00)  T(F): 98 (16 May 2023 04:00), Max: 98 (16 May 2023 04:00)  HR: 80 (16 May 2023 06:00) (72 - 88)  BP: 94/52 (15 May 2023 18:37) (84/69 - 94/52)  BP(mean): 64 (15 May 2023 18:37) (64 - 75)  ABP: 127/69 (16 May 2023 06:00) (90/49 - 134/74)  ABP(mean): 90 (16 May 2023 06:00) (64 - 95)  RR: 17 (16 May 2023 06:00) (14 - 28)  SpO2: 100% (16 May 2023 06:00) (88% - 100%)    O2 Parameters below as of 16 May 2023 06:00  Patient On (Oxygen Delivery Method): nasal cannula, high flow  O2 Flow (L/min): 60  O2 Concentration (%): 80          05-14 @ 07:01  -  05-15 @ 07:00  --------------------------------------------------------  IN: 665 mL / OUT: 2635 mL / NET: -1970 mL    05-15 @ 07:01  -  05-16 @ 06:28  --------------------------------------------------------  IN: 1097 mL / OUT: 1360 mL / NET: -263 mL      CAPILLARY BLOOD GLUCOSE      POCT Blood Glucose.: 254 mg/dL (15 May 2023 22:29)    Physical Exam:   Constitutional: no acute distress   HEENT: + PERRLA, EOMI, no drainage or redness  Neck: supple,  No JVD  Respiratory: scattered Rhonchi B/L    Cardiovascular: Regular rate, regular rhythm, normal S1, S2; no murmurs or rub  Gastrointestinal: obese, soft, non-tender, non distended, no hepatosplenomegaly, normal bowel sounds  Extremities: + 2 peripheral edema, no cyanosis, no clubbing   Vascular: Equal and normal pulses: 2+ peripheral pulses throughout  Neurological: alert and oriented   Psychiatric: anxious at times   Musculoskeletal: No joint swelling or deformity; no limitation of extremities, but noted weakness  Skin: warm, dry, well perfused, no rashes, ecchymotic upper extremities       HOSPITAL MEDICATIONS:  Standing Meds:  amitriptyline 10 milliGRAM(s) Oral at bedtime  atorvastatin 20 milliGRAM(s) Oral at bedtime  buMETAnide Injectable 1 milliGRAM(s) IV Push two times a day  chlorhexidine 2% Cloths 1 Application(s) Topical <User Schedule>  cholecalciferol 1000 Unit(s) Oral daily  DAPTOmycin IVPB      DAPTOmycin IVPB 650 milliGRAM(s) IV Intermittent every 24 hours  dextrose 5%. 1000 milliLiter(s) IV Continuous <Continuous>  dextrose 5%. 1000 milliLiter(s) IV Continuous <Continuous>  dextrose 50% Injectable 12.5 Gram(s) IV Push once  dextrose 50% Injectable 25 Gram(s) IV Push once  dextrose 50% Injectable 25 Gram(s) IV Push once  DULoxetine 30 milliGRAM(s) Oral daily  fluticasone propionate 50 MICROgram(s)/spray Nasal Spray 1 Spray(s) Both Nostrils two times a day  glucagon  Injectable 1 milliGRAM(s) IntraMuscular once  hydrocodone/homatropine Syrup 5 milliLiter(s) Oral every 8 hours  insulin glargine Injectable (LANTUS) 14 Unit(s) SubCutaneous at bedtime  insulin lispro (ADMELOG) corrective regimen sliding scale   SubCutaneous at bedtime  insulin lispro (ADMELOG) corrective regimen sliding scale   SubCutaneous three times a day before meals  insulin lispro Injectable (ADMELOG) 7 Unit(s) SubCutaneous three times a day before meals  lactobacillus acidophilus 1 Tablet(s) Oral three times a day with meals  lidocaine   4% Patch 1 Patch Transdermal daily  methylPREDNISolone sodium succinate Injectable 40 milliGRAM(s) IV Push two times a day  metoprolol tartrate 25 milliGRAM(s) Oral three times a day  pantoprazole  Injectable 40 milliGRAM(s) IV Push every 12 hours  sildenafil (REVATIO) 20 milliGRAM(s) Oral every 8 hours  sodium chloride 0.65% Nasal 1 Spray(s) Both Nostrils two times a day  trimethoprim  40 mG/sulfamethoxazole 200 mG Suspension 400 milliGRAM(s) Oral every 8 hours      PRN Meds:  albuterol/ipratropium for Nebulization 3 milliLiter(s) Nebulizer every 6 hours PRN  bisacodyl Suppository 10 milliGRAM(s) Rectal daily PRN  dextrose Oral Gel 15 Gram(s) Oral once PRN  guaiFENesin Oral Liquid (Sugar-Free) 200 milliGRAM(s) Oral every 6 hours PRN      LABS:                        6.8    10.20 )-----------( 255      ( 16 May 2023 04:00 )             22.2     Hgb Trend: 6.8<--, 7.2<--, 7.8<--, 7.5<--, 8.3<--  05-16    134<L>  |  95<L>  |  40<H>  ----------------------------<  199<H>  4.0   |  28  |  1.36<H>    Ca    9.2      16 May 2023 04:00  Phos  3.5     05-16  Mg     1.90     05-16    TPro  5.9<L>  /  Alb  2.6<L>  /  TBili  0.3  /  DBili  x   /  AST  18  /  ALT  14  /  AlkPhos  143<H>  05-16    Creatinine Trend: 1.36<--, 1.56<--, 1.00<--, 1.00<--, 0.92<--, 1.06<--      Arterial Blood Gas:  05-16 @ 04:00  7.40/50/133/31/99.1/5.6  ABG lactate: --  Arterial Blood Gas:  05-15 @ 20:00  7.43/47/80/31/--/6.2  ABG lactate: --  Arterial Blood Gas:  05-15 @ 08:11  7.47/42/204/31/--/6.3  ABG lactate: --  Arterial Blood Gas:  05-15 @ 01:30  7.43/50/109/33/96.7/8.0  ABG lactate: --  Arterial Blood Gas:  05-14 @ 17:20  7.43/46/113/30/97.6/5.6  ABG lactate: --  Arterial Blood Gas:  05-14 @ 09:00  7.40/48/83/30/--/4.3  ABG lactate: --        MICROBIOLOGY:     RADIOLOGY:  [ ] Reviewed and interpreted by me

## 2023-05-17 NOTE — CONSULT NOTE ADULT - PROBLEM SELECTOR RECOMMENDATION 3
- possibly due to ILD flare, PJP PNA and fluid overload 2/2 acute on chronic decompensated RV failure  - CXRAY -  diffuse opacities consistent with interstitial lung disease.  - TTE-  Septal motion consistent with right ventricular overload. Flattening of the interventricular septum in both systole and diastole is consistent with right ventricular pressure overload.  Right ventricular enlargement with decreased right ventricular systolic function. Severe tricuspid regurgitation. Estimated pulmonary artery systolic pressure equals 96 mm Hg, assuming right atrial pressure equals 3  mm Hg, there is echocardiographic evidence of severe pulmonary hypertension.  - management as per primary team

## 2023-05-17 NOTE — PROGRESS NOTE ADULT - SUBJECTIVE AND OBJECTIVE BOX
Interval Events:   -tylenol given overnight for pain  -VA duplex showed left axillary DVT    HPI:  62 year old female with a PMHx of IDL on 3.5L home O2, ALMA on CPAP and chronic prednisone, pulmonary HTN, T2DM, RA, psoriatic arthritis who initially present to [facility] for worsening dyspnea/ hypoxia requiring HFCA and BIPAP and was transferred to [facility] further management She has had multiple recent hospitalizations for acute on chronic hypoxic respiratory failure 2/2 IDL flair/ PNA. During her hospitalization she had a rapid response called (on 4/21) for left facial droop and AMS c/f possible stroke. Imagining at the time demonstrated high grade L sided carotid stenosis for which pt and family decided not to pursue surgical intervention.   Pt was then found to be febrile and tachycardic, and was transferred to ICU for sepsis with multifactorial encephalopathy and AHRF. Pts blood cultures grew MRSA and she was initially treated with vancomycin (4/21-4/25 and 5/3-5/8) but continued to have positive blood cultures x5. She was also started on Merrem (4/21-4/23) and subsequently started on Daptomycin on 4/25 with dose increase on 5/1. Plan was to continue the Daptomycin for a 6 week course and Rifampin (started on 5/1) for a 14 day course. Of note, pt had negative blood cx x2 on 5/4/23. Pt was found to be febrile again on 5/8 with a rectal temp of 100.7. Fungitell was ordered and pt was started on Meropenem. Possible suspected source was superficial thrombophlebitis (seen on U/S in the left cephalic vein). ID recommended NM whole body scan/ indium scan and ELICEO to r/o other source of persistent bacteremia, however pt was unable to lie flat given her respiratory status and these studies were not pursued (At the time of transfer, pt remained on dapto, baeba, and rifampine)  Pt hospital course was further complicated by hypercalcemia likely PTH-mediated ISO a L parathyroid adenoma. She was started on IVF and given Alendronate x1 with improvement. Palliative care was also consulted for multimodal pain management for diffuse arthralgias/myalgias possible 2/2 RA vs fibromyalgia     (09 May 2023 23:13)      REVIEW OF SYSTEMS:  Constitutional: [x] negative [ ] fevers [ ] chills [ ] weight loss [ ] weight gain  HEENT: [x] negative [ ] dry eyes [ ] eye irritation [ ] postnasal drip [ ] nasal congestion  CV: [x] negative  [ ] chest pain [ ] orthopnea [ ] palpitations [ ] murmur  Resp: [ ] negative [ ] cough [x] shortness of breath [ ] dyspnea [ ] wheezing [ ] sputum [ ] hemoptysis  GI: [x] negative [ ] nausea [ ] vomiting [ ] diarrhea [ ] constipation [ ] abd pain [ ] dysphagia   : [x] negative [ ] dysuria [ ] nocturia [ ] hematuria [ ] increased urinary frequency  Musculoskeletal: [ ] negative [x] back pain [ ] myalgias [ ] arthralgias [ ] fracture  Skin: [x] negative [ ] rash [ ] itch  Neurological: [x] negative [ ] headache [ ] dizziness [ ] syncope [ ] weakness [ ] numbness  Psychiatric: [x] negative [ ] anxiety [ ] depression  Endocrine: [x negative [ ] diabetes [ ] thyroid problem  Hematologic/Lymphatic: [x negative [ ] anemia [ ] bleeding problem  Allergic/Immunologic: [x]negative [ ] itchy eyes [ ] nasal discharge [ ] hives [ ] angioedema  [x All other systems negative  [ ] Unable to assess ROS because ________    OBJECTIVE:  ICU Vital Signs Last 24 Hrs  T(C): 36 (17 May 2023 04:00), Max: 37.1 (16 May 2023 20:00)  T(F): 96.8 (17 May 2023 04:00), Max: 98.8 (16 May 2023 20:00)  HR: 96 (17 May 2023 06:00) (63 - 96)  BP: --  BP(mean): --  ABP: 132/70 (17 May 2023 06:00) (77/48 - 147/75)  ABP(mean): 92 (17 May 2023 06:00) (60 - 101)  RR: 22 (17 May 2023 06:00) (17 - 35)  SpO2: 89% (17 May 2023 06:00) (89% - 100%)    O2 Parameters below as of 17 May 2023 04:00  Patient On (Oxygen Delivery Method): nasal cannula, high flow              05-15 @ 07:01  -  05-16 @ 07:00  --------------------------------------------------------  IN: 1097 mL / OUT: 1400 mL / NET: -303 mL    05-16 @ 07:01  - 05-17 @ 06:43  --------------------------------------------------------  IN: 212 mL / OUT: 1745 mL / NET: -1533 mL      CAPILLARY BLOOD GLUCOSE      POCT Blood Glucose.: 100 mg/dL (17 May 2023 05:58)    Physical Exam:   Constitutional: no acute distress   HEENT: + PERRLA, EOMI, no drainage or redness  Neck: supple,  No JVD  Respiratory: scattered Rhonchi B/L    Cardiovascular: Regular rate, regular rhythm, normal S1, S2; no murmurs or rub  Gastrointestinal: obese, soft, non-tender, non distended, no hepatosplenomegaly, normal bowel sounds  Extremities: + 2 peripheral edema, no cyanosis, no clubbing   Vascular: Equal and normal pulses: 2+ peripheral pulses throughout  Neurological: alert and oriented   Psychiatric: anxious at times   Musculoskeletal: No joint swelling or deformity; no limitation of extremities, but noted weakness  Skin: warm, dry, well perfused, no rashes, ecchymotic upper extremities       HOSPITAL MEDICATIONS:  Standing Meds:  amitriptyline 10 milliGRAM(s) Oral at bedtime  atorvastatin 20 milliGRAM(s) Oral at bedtime  buMETAnide Injectable 1 milliGRAM(s) IV Push two times a day  chlorhexidine 2% Cloths 1 Application(s) Topical <User Schedule>  cholecalciferol 1000 Unit(s) Oral daily  dextrose 5%. 1000 milliLiter(s) IV Continuous <Continuous>  dextrose 5%. 1000 milliLiter(s) IV Continuous <Continuous>  dextrose 50% Injectable 25 Gram(s) IV Push once  dextrose 50% Injectable 25 Gram(s) IV Push once  dextrose 50% Injectable 12.5 Gram(s) IV Push once  DULoxetine 30 milliGRAM(s) Oral daily  fluticasone propionate 50 MICROgram(s)/spray Nasal Spray 1 Spray(s) Both Nostrils two times a day  glucagon  Injectable 1 milliGRAM(s) IntraMuscular once  heparin   Injectable 5000 Unit(s) SubCutaneous every 8 hours  hydrocodone/homatropine Syrup 5 milliLiter(s) Oral every 8 hours  insulin glargine Injectable (LANTUS) 14 Unit(s) SubCutaneous at bedtime  insulin lispro (ADMELOG) corrective regimen sliding scale   SubCutaneous at bedtime  insulin lispro (ADMELOG) corrective regimen sliding scale   SubCutaneous three times a day before meals  insulin lispro Injectable (ADMELOG) 7 Unit(s) SubCutaneous three times a day before meals  lactobacillus acidophilus 1 Tablet(s) Oral three times a day with meals  lidocaine   4% Patch 1 Patch Transdermal daily  methylPREDNISolone sodium succinate Injectable 40 milliGRAM(s) IV Push two times a day  metoprolol tartrate 25 milliGRAM(s) Oral three times a day  pantoprazole  Injectable 40 milliGRAM(s) IV Push every 12 hours  sildenafil (REVATIO) 20 milliGRAM(s) Oral every 8 hours  sodium chloride 0.65% Nasal 1 Spray(s) Both Nostrils two times a day  trimethoprim  40 mG/sulfamethoxazole 200 mG Suspension 400 milliGRAM(s) Oral every 8 hours      PRN Meds:  albuterol/ipratropium for Nebulization 3 milliLiter(s) Nebulizer every 6 hours PRN  bisacodyl Suppository 10 milliGRAM(s) Rectal daily PRN  dextrose Oral Gel 15 Gram(s) Oral once PRN  guaiFENesin Oral Liquid (Sugar-Free) 200 milliGRAM(s) Oral every 6 hours PRN      LABS:                        7.9    14.51 )-----------( 250      ( 17 May 2023 01:41 )             25.0     Hgb Trend: 7.9<--, 7.8<--, 6.8<--, 7.2<--, 7.8<--  05-17    132<L>  |  94<L>  |  35<H>  ----------------------------<  184<H>  3.9   |  26  |  1.28    Ca    9.2      17 May 2023 01:41  Phos  3.6     05-17  Mg     2.50     05-17    TPro  6.0  /  Alb  2.6<L>  /  TBili  0.3  /  DBili  x   /  AST  17  /  ALT  13  /  AlkPhos  132<H>  05-17    Creatinine Trend: 1.28<--, 1.42<--, 1.36<--, 1.56<--, 1.00<--, 1.00<--      Arterial Blood Gas:  05-17 @ 01:41  7.37/51/147/30/98.8/3.6  ABG lactate: --  Arterial Blood Gas:  05-16 @ 04:00  7.40/50/133/31/99.1/5.6  ABG lactate: --  Arterial Blood Gas:  05-15 @ 20:00  7.43/47/80/31/--/6.2  ABG lactate: --  Arterial Blood Gas:  05-15 @ 08:11  7.47/42/204/31/--/6.3  ABG lactate: --

## 2023-05-17 NOTE — PROGRESS NOTE ADULT - ASSESSMENT
62F with ILD on 3.5L home O2/CPAP and chronic prednisone, pulmonary HTN, DM2, RA, psoriatic arthritis [place] to [facility] [place] worsening dyspnea and hypoxia April 2023, admitted for ILD with exacerbation and acute on chronic hypoxic respiratory failure. She was treated with high-dose corticosteroids and started on HFNC, course complicated by AMS, found to have demonstrated high grade L-sided carotid stenosis, course further complicated by worsening hypoxia requiring BIPAP and fever, transferred to MICU, found to have MRSA bacteremia (4/24), initially treated with Vancomycin, switched to Daptomycin (4/25) with subsequent increased dose on (5/1), Blood culture cleared on (5/4), suspected source was from superficial thrombophlebitis, seen on US (4/17: Superficial thrombus is seen in the left cephalic vein at the level of the forearm. No DVT), planned for 6 weeks course of dapto with Rifampin 300mg PO q12 for total of 2 week course, course again complicated by low grade fever 100.7F (5/8), added meropenem for empiric coverage. Eventually patient transferred to [facility], ID consulted for assistance.    Denies any cardiac device, hardware, or prosthetic joint replacement  has not received biologics for arthritis in last year     CXR with diffuse opacity consistent with ILD  US LUE (4/17): Superficial thrombus is seen in the left cephalic vein at the level of the forearm. No DVT   VA duplex 5/16 DVT+ left axillary   BCx (4/24, 4/25, 4/27, 4/28, 5/1, 5/2) positive for MRSA  BCx (5/4) no growth  TTE (5/10) without obvious vegetation   BCx 5/9 no growth     Antimicrobials :    DAPTOmycin IVPB    DAPTOmycin IVPB 650 every 24 hours  5/10-   meropenem  IVPB 1000 every 8 hours   5/9 - 5/14  trimethoprim  40 mG/sulfamethoxazole 200 mG Suspension 400 every 8 hours 5/10-      #MRSA Bacteremia   high grade at outside hospital   unclear source cultures here 5/9 no growth   #Acute Hypoxic Respiratory Failure 2/2 ILD    - bacteremia source was suspected L thrombophlebitis at [facility] , but still consider endocarditis in ddx no obvious joint infection though is a consideration in patient with psoriatic arthritis now with duplex showing DVT left axillary  This could represent endovascular source     - completed course meropenem for possible HAP  - bactrim added for possible pcp -elevated fungitell     RECOMMENDATIONS    - re-order  dapto - will need at least additional 4 weeks from clearance 5/2   - CPK   - continue bactrim po treatment dose  - sputum for PCP PCR  - ELICEO when feasible   - Indium scan when feasible

## 2023-05-17 NOTE — CONSULT NOTE ADULT - ASSESSMENT
62F history ILD thought secondary to prior Methotrexate use, severe pulm HTN RA and Psoriatic arthritis, now with acute on chronic hypoxic resp failure 2/2 a combination of possible ILD flare, probable PJP PNA and fluid overload 2/2 acute on chronic decompensated RV failure, MRSA bacteremia and SHAAN 2/2 pre-renal ATN.  Palliative Care consulted for complex decision making  related to goals of care discussions in the setting of advanced illness/ evaluation and management of pain/ symptoms

## 2023-05-17 NOTE — CONSULT NOTE ADULT - PROBLEM SELECTOR RECOMMENDATION 5
Case reviewed with primary team  Thank you for allowing us to participate in your patient's care. Please page [phone] for any questions/concerns. Patient has been  to her  for over 30 years. They had two children;  shares that son passed away from cancer. Patient and her  now live with daughter, who helps care for patient.   Patient confirms her daughter Joyce is her HCP.   Primary team reviewed with patient her understanding of her ILD disease and respiratory failure. They reviewed they have consulted transplant team     Case reviewed with primary team  Thank you for allowing us to participate in your patient's care. Please page [phone] for any questions/concerns. Patient has been  to her  for over 30 years. They had two children;  shares that son passed away from cancer. Patient and her  now live with daughter, who helps care for patient.   Patient confirms her daughter Joyce is her HCP.   Primary team reviewed with patient her understanding of her ILD disease and respiratory failure. They reviewed they have consulted transplant team to evaluate for transplant candidacy given extent of her lung disease. Team reviewed concern regarding patient's guarded prognosis.   Patient appeared to understand her prognosis as she asked "when she was going to die".  Patient shared she would be okay because she would be with her son (who passed away). Emotional support provided to patient and her .     Case reviewed with primary team  Thank you for allowing us to participate in your patient's care. Please page [phone] for any questions/concerns.

## 2023-05-17 NOTE — CONSULT NOTE ADULT - PROBLEM/RECOMMENDATION-4
Erasmo Donnelly [address]? [place]     Depression Screening:  3 most recent PHQ Screens 4/5/DISPLAY PLAN FREE TEXT

## 2023-05-17 NOTE — CONSULT NOTE ADULT - ASSESSMENT
#Lung transplant evaluation  -Patient with elevated BMI and decreased functional status  -Dr. Veloz had a lengthy discussion regarding patient's ineligibility at this time to be a transplant candidate  -MICU optimization  -Aggressive PT/OT  -If clinical status changes, please consult Lung Transplant    Above discussed with Dr. Veloz

## 2023-05-17 NOTE — CONSULT NOTE ADULT - TIME BILLING
reviewing chart and coordinating care with primary team/staff, as well as reviewing vitals, radiology, medication list, recent labs, and prior records.
Time spent for extensive review of the physical chart, electronic health record, and documentation to obtain collateral information including but not limited to:    - Current inpatient records (ED, H&P, primary team, and consultants if applicable)   - Inpatient values/results (biomarkers, immunoassays, imaging, and microbiology results)   - Current or proposed treatment plans   - Pharmacotherapy review   Time spent for counseling and education with patient/family  Time spent discussing and coordinating care with primary team and interdisciplinary staff and floor staff

## 2023-05-17 NOTE — CONSULT NOTE ADULT - NS ATTEND AMEND GEN_ALL_CORE FT
62 year old female with a PMHx of IDL on 3.5L home O2, ALMA on CPAP and chronic prednisone, pulmonary HTN, T2DM, RA, psoriatic arthritis who initially present to [facility] for worsening dyspnea/ hypoxia requiring HFCA and BIPAP and was transferred to [facility] further management She has had multiple recent hospitalizations for acute on chronic hypoxic respiratory failure 2/2 IDL flair/ PNA.      Lung transplant consulted for evaluation.    We went over the risks and benefits of lung transplantation including one and five year survival. The median survival after a double lung transplant is about 6.5 years and this was explained in detail. We also went over the risks of opportunistic infections and the risks of calcineurin inhibitor use after the transplant with inherent risks of neoplasms and opportunistic infections and other complications. The post-operative recovery period was explained in detail including the need for mechanical ventilation and other means of cardiopulmonary support including extracorporal membrane oxygenation use and the types of incisions and chest tubes that are required.     At this time, the patient's BMI is too high and she is profoundly deconditioned making her a poor transplant candidate.  Would continue current MICU care and if clinical condition improves and she is able to lose 20-25 lbs and become more ambulatory, can reassess.  All questions answered to patient and  at bedside and they understood.  Rest of plan as above.

## 2023-05-17 NOTE — PROGRESS NOTE ADULT - NS ATTEND AMEND GEN_ALL_CORE FT
Pt seen and examined. 62F with extensive history including ILD thought secondary to prior Methotrexate use, severe pulm HTN RA and Psoriatic arthritis, now with acute on chronic hypoxic resp failure 2/2 a combination of possible ILD flare, probable PJP PNA and fluid overload 2/2 acute on chronic decompensated RV failure, MRSA bacteremia and SHAAN 2/2 pre-renal ATN. Remains on HFNC now with FiO2 of 75 % and Angie at 20 ppm. Cont to titrate down as tolerated to keep O2 sat > 88 %. Cont Sildenafil TID. Remains on Bactrim for PJP pneumonia, decrease solumedrol to 40 mg IV daily. Cr trending down, clinically appears euvolemic and is - 11 L for LOS. Reduce Bumex to 1 mg daily.  Cont Daptomycin IV for MRSA bacteremia, BCxs from 5/9 NGTD. Unable to get ELICEO to r/o endocarditis d/t resp instability. Overall prognosis extremely guarded. Not a transplant candidate per discussion with transplant team. Ongoing GOC discussion, appreciate Palliative care evalv. Patient and  updated at bedside.

## 2023-05-17 NOTE — PROGRESS NOTE ADULT - SUBJECTIVE AND OBJECTIVE BOX
Follow Up:  MRSA bacteremia outside hospital     Interval History/ROS:  remains on HFNC in MICU    DVT left axillary       Allergies  penicillins (Other)        ANTIMICROBIALS:  trimethoprim  40 mG/sulfamethoxazole 200 mG Suspension 400 every 8 hours    MEDICATIONS  (STANDING):  albuterol/ipratropium for Nebulization 3 every 6 hours PRN  amitriptyline 10 at bedtime  atorvastatin 20 at bedtime  bisacodyl Suppository 10 daily PRN  dextrose 50% Injectable 12.5 once  dextrose 50% Injectable 25 once  dextrose 50% Injectable 25 once  dextrose Oral Gel 15 once PRN  DULoxetine 30 daily  glucagon  Injectable 1 once  guaiFENesin Oral Liquid (Sugar-Free) 200 every 6 hours PRN  heparin   Injectable 5000 every 8 hours  hydrocodone/homatropine Syrup 5 every 8 hours  insulin glargine Injectable (LANTUS) 14 at bedtime  insulin lispro (ADMELOG) corrective regimen sliding scale  at bedtime  insulin lispro (ADMELOG) corrective regimen sliding scale  three times a day before meals  insulin lispro Injectable (ADMELOG) 7 three times a day before meals  methylPREDNISolone sodium succinate Injectable 40 daily  metoprolol tartrate 25 three times a day  pantoprazole  Injectable 40 every 12 hours  sildenafil (REVATIO) 20 every 8 hours    Vital Signs Last 24 Hrs  T(F): 97.8 (05-17-23 @ 16:00), Max: 98.8 (05-16-23 @ 20:00)  HR: 84 (05-17-23 @ 17:00)  BP: --  RR: 18 (05-17-23 @ 17:00)  SpO2: 99% (05-17-23 @ 17:00) (84% - 100%)    PHYSICAL EXAM:  Constitutional: alert, awake, HFNC  RS: few crackles   CVS: S1, S2   Abdomen: Soft. No guarding/rigidity/tenderness.  Extremities: ecchymosis right arm fading   Vascular:  Neuro: Alert, oriented to time/place/person  Cranial nerves 2-12 grossly normal. No focal abnormalities                                     7.4    15.59 )-----------( 264      ( 17 May 2023 15:45 )             24.1 05-17    131  |  98  |  34  ----------------------------<  372  4.1   |  23  |  1.01  Ca    9.1      17 May 2023 15:45Phos  3.5     05-17Mg     2.00     05-17  TPro  5.5  /  Alb  2.4  /  TBili  0.3  /  DBili  x   /  AST  20  /  ALT  14  /  AlkPhos  136  05-17      ckCreatine Kinase, Serum: 22 U/L (05.11.23 @ 02:15)     MICROBIOLOGY:    .Blood Blood-Peripheral  05-09-23   No growth  --  --      .Blood Blood-Peripheral  05-09-23   No growth  --  --        Rapid RVP Result: NotDetec (05-10 @ 06:36)    Fungitell: 246:     RADIOLOGY:    r< from: Xray Chest 1 View- PORTABLE-Urgent (Xray Chest 1 View- PORTABLE-Urgent .) (05.09.23 @ 23:43) >    ACC: [id number] EXAM:  XR CHEST PORTABLE URGENT 1V   ORDERED BY: VERO RUFF     PROCEDURE DATE:  05/09/2023          INTERPRETATION:  CLINICAL INFORMATION: Hypoxia    TIME OF EXAMINATION: May 9 at 11:34 PM    EXAM: Portable chest    FINDINGS:  Low lung volumes with increased markings consistent with interstitial   lung disease considering past studies including chest CT.    No new focal consolidation to indicate pneumonia.    Heart size is stable. No effusions or pneumothorax        COMPARISON: May 8        IMPRESSION: Follow-up with diffuse opacities consistent with interstitial   lung disease.    --- End of Report ---            DARNELL VILLALPANDO MD; Attending Radiologist  This document has been electronically signed. May 10 2023  9:14AM    < end of copied text >

## 2023-05-17 NOTE — CONSULT NOTE ADULT - SUBJECTIVE AND OBJECTIVE BOX
Lung Transplant Consultation   =====================================================  HPI:  62 year old female with a PMHx of IDL on 3.5L home O2, ALMA on CPAP and chronic prednisone, pulmonary HTN, T2DM, RA, psoriatic arthritis who initially present to [facility] for worsening dyspnea/ hypoxia requiring HFCA and BIPAP and was transferred to [facility] further management She has had multiple recent hospitalizations for acute on chronic hypoxic respiratory failure 2/2 IDL flair/ PNA. During her hospitalization she had a rapid response called (on 4/21) for left facial droop and AMS c/f possible stroke. Imagining at the time demonstrated high grade L sided carotid stenosis for which pt and family decided not to pursue surgical intervention.   Pt was then found to be febrile and tachycardic, and was transferred to ICU for sepsis with multifactorial encephalopathy and AHRF. Pts blood cultures grew MRSA and she was initially treated with vancomycin (4/21-4/25 and 5/3-5/8) but continued to have positive blood cultures x5. She was also started on Merrem (4/21-4/23) and subsequently started on Daptomycin on 4/25 with dose increase on 5/1. Plan was to continue the Daptomycin for a 6 week course and Rifampin (started on 5/1) for a 14 day course. Of note, pt had negative blood cx x2 on 5/4/23. Pt was found to be febrile again on 5/8 with a rectal temp of 100.7. Fungitell was ordered and pt was started on Meropenem. Possible suspected source was superficial thrombophlebitis (seen on U/S in the left cephalic vein). ID recommended NM whole body scan/ indium scan and ELICEO to r/o other source of persistent bacteremia, however pt was unable to lie flat given her respiratory status and these studies were not pursued (At the time of transfer, pt remained on dapto, abeba, and rifampine)  Pt hospital course was further complicated by hypercalcemia likely PTH-mediated ISO a L parathyroid adenoma. She was started on IVF and given Alendronate x1 with improvement. Palliative care was also consulted for multimodal pain management for diffuse arthralgias/myalgias possible 2/2 RA vs fibromyalgia     (09 May 2023 23:13)    Patient seen at bedside, lying in bed on HFNC 60L/60%.  Conversing in full sentences.  Patient states she has been oxygen dependent x 2 years on3.5L NC.  She was in and out of the hospital over the last month, recently to [place] ED, and transferred to [facility] MICU for further care.  She is unable to exercise, as she desats to 60% with minimal exertion.  Dr. Veloz had lengthy discussion with  at bedside, about lung transplantation and barriers to transplant are increased BMI and limited functional mobility.       PAST MEDICAL & SURGICAL HISTORY:  ILD (interstitial lung disease)      HTN (hypertension)      Type 2 diabetes mellitus      ALMA (obstructive sleep apnea)      H/O pulmonary hypertension        Home Meds: Home Medications:  Acidophilus Extra Strength oral capsule: 2 tab(s) orally 3 times a day (10 May 2023 01:18)  Admelog 100 units/mL injectable solution: 5 unit(s) injectable 3 times a day (before meals) (10 May 2023 01:12)  amitriptyline 10 mg oral tablet: 1 tab(s) orally once a day (at bedtime) (10 May 2023 01:18)  Anusol-HC 25 mg rectal suppository: 1 suppository(ies) rectally 2 times a day as needed for pain (10 May 2023 01:16)  bisacodyl 10 mg rectal suppository: 1 suppository(ies) rectally once a day as needed for  constipation (10 May 2023 01:15)  cholecalciferol 25 mcg (1000 intl units) oral tablet: 1 tab(s) orally once a day (10 May 2023 01:18)  DAPTOmycin 500 mg intravenous injection: 4 intravenously once a day Pt in 650mg daily (10 May 2023 01:14)  DULoxetine 30 mg oral delayed release capsule: 2 cap(s) orally once a day (10 May 2023 01:04)  insulin glargine 100 units/mL subcutaneous solution: 12 unit(s) subcutaneous once a day (at bedtime) (10 May 2023 01:07)  meropenem 1000 mg/ 50 mL-NaCl 0.9% intravenous solution: 1,000 intravenously every 8 hours (10 May 2023 01:06)  metoprolol tartrate 25 mg oral tablet: 1 orally 3 times a day (10 May 2023 01:03)  naproxen 500 mg oral tablet: 1 orally 2 times a day as needed for  moderate pain (10 May 2023 01:17)  NIFEdipine 30 mg oral tablet, extended release: 1 tab(s) orally once a day (10 May 2023 01:09)  predniSONE 20 mg oral tablet: 2 tab(s) orally once a day (10 May 2023 01:11)  rifAMPin 300 mg oral capsule: 1 cap(s) orally 2 times a day (10 May 2023 01:02)  torsemide 10 mg oral tablet: 1 orally once a day (10 May 2023 01:10)    Allergies: Allergies    penicillins (Other)    Intolerances      Soc:   Advanced Directives: Presumed Full Code         Performs activitis of daily living with *** assistance   Non-diabetic ***  Requires *** L NC at rest   *** assisted ventilation needed    ROS:    REVIEW OF SYSTEMS    [ ] A ten-point review of systems was otherwise negative except as noted.  [ ] Due to altered mental status/intubation, subjective information were not able to be obtained from the patient. History was obtained, to the extent possible, from review of the chart and collateral sources of information.      CURRENT MEDICATIONS:   --------------------------------------------------------------------------------------  Neurologic Medications  amitriptyline 10 milliGRAM(s) Oral at bedtime  DULoxetine 30 milliGRAM(s) Oral daily    Respiratory Medications  albuterol/ipratropium for Nebulization 3 milliLiter(s) Nebulizer every 6 hours PRN Shortness of Breath and/or Wheezing  guaiFENesin Oral Liquid (Sugar-Free) 200 milliGRAM(s) Oral every 6 hours PRN Cough  hydrocodone/homatropine Syrup 5 milliLiter(s) Oral every 8 hours    Cardiovascular Medications  metoprolol tartrate 25 milliGRAM(s) Oral three times a day  sildenafil (REVATIO) 20 milliGRAM(s) Oral every 8 hours    Gastrointestinal Medications  bisacodyl Suppository 10 milliGRAM(s) Rectal daily PRN Constipation  cholecalciferol 1000 Unit(s) Oral daily  dextrose 5%. 1000 milliLiter(s) IV Continuous <Continuous>  dextrose 5%. 1000 milliLiter(s) IV Continuous <Continuous>  pantoprazole  Injectable 40 milliGRAM(s) IV Push every 12 hours    Genitourinary Medications    Hematologic/Oncologic Medications    Antimicrobial/Immunologic Medications  trimethoprim  40 mG/sulfamethoxazole 200 mG Suspension 400 milliGRAM(s) Oral every 8 hours    Endocrine/Metabolic Medications  atorvastatin 20 milliGRAM(s) Oral at bedtime  dextrose 50% Injectable 25 Gram(s) IV Push once  dextrose 50% Injectable 25 Gram(s) IV Push once  dextrose 50% Injectable 12.5 Gram(s) IV Push once  dextrose Oral Gel 15 Gram(s) Oral once PRN Blood Glucose LESS THAN 70 milliGRAM(s)/deciliter  glucagon  Injectable 1 milliGRAM(s) IntraMuscular once  insulin glargine Injectable (LANTUS) 14 Unit(s) SubCutaneous at bedtime  insulin lispro (ADMELOG) corrective regimen sliding scale   SubCutaneous three times a day before meals  insulin lispro (ADMELOG) corrective regimen sliding scale   SubCutaneous at bedtime  insulin lispro Injectable (ADMELOG) 7 Unit(s) SubCutaneous three times a day before meals  methylPREDNISolone sodium succinate Injectable 40 milliGRAM(s) IV Push daily    Topical/Other Medications  chlorhexidine 2% Cloths 1 Application(s) Topical <User Schedule>  fluticasone propionate 50 MICROgram(s)/spray Nasal Spray 1 Spray(s) Both Nostrils two times a day  lactobacillus acidophilus 1 Tablet(s) Oral three times a day with meals  lidocaine   4% Patch 1 Patch Transdermal daily  sodium chloride 0.65% Nasal 1 Spray(s) Both Nostrils two times a day    --------------------------------------------------------------------------------------    VITAL SIGNS, INS/OUTS (last 24 hours):  --------------------------------------------------------------------------------------  ICU Vital Signs Last 24 Hrs  T(C): 36.2 (17 May 2023 12:00), Max: 37.1 (16 May 2023 20:00)  T(F): 97.2 (17 May 2023 12:00), Max: 98.8 (16 May 2023 20:00)  HR: 86 (17 May 2023 15:00) (66 - 96)  BP: --  BP(mean): --  ABP: 108/51 (17 May 2023 15:00) (89/48 - 147/75)  ABP(mean): 72 (17 May 2023 15:00) (59 - 101)  RR: 22 (17 May 2023 15:18) (14 - 36)  SpO2: 96% (17 May 2023 15:18) (84% - 100%)    O2 Parameters below as of 17 May 2023 15:18  Patient On (Oxygen Delivery Method): nasal cannula, high flow  O2 Flow (L/min): 60  O2 Concentration (%): 75      I&O's Summary    16 May 2023 07:01  -  17 May 2023 07:00  --------------------------------------------------------  IN: 212 mL / OUT: 1945 mL / NET: -1733 mL    17 May 2023 07:01  -  17 May 2023 16:01  --------------------------------------------------------  IN: 100 mL / OUT: 585 mL / NET: -485 mL      --------------------------------------------------------------------------------------    EXAM:  General:  Chest:  Respiratory:  Cardiac:  Gastrointestinal:  Extremities:  Neuro:  --------------------------------------------------------------------------------------    OTHER LABS    IMAGING RESULTS       Lung Transplant Consultation   =====================================================  HPI:  62 year old female with a PMHx of IDL on 3.5L home O2, ALMA on CPAP and chronic prednisone, pulmonary HTN, T2DM, RA, psoriatic arthritis who initially present to [facility] for worsening dyspnea/ hypoxia requiring HFCA and BIPAP and was transferred to [facility] further management She has had multiple recent hospitalizations for acute on chronic hypoxic respiratory failure 2/2 IDL flair/ PNA.    During her hospitalization she had a rapid response called (on 4/21) for left facial droop and AMS c/f possible stroke. Imagining at the time demonstrated high grade L sided carotid stenosis for which pt and family decided not to pursue surgical intervention.   Pt was then found to be febrile and tachycardic, and was transferred to ICU for sepsis with multifactorial encephalopathy and AHRF. Pts blood cultures grew MRSA and she was initially treated with vancomycin (4/21-4/25 and 5/3-5/8) but continued to have positive blood cultures x5. She was also started on Merrem (4/21-4/23) and subsequently started on Daptomycin on 4/25 with dose increase on 5/1. Plan was to continue the Daptomycin for a 6 week course and Rifampin (started on 5/1) for a 14 day course. Of note, pt had negative blood cx x2 on 5/4/23. Pt was found to be febrile again on 5/8 with a rectal temp of 100.7. Fungitell was ordered and pt was started on Meropenem. Possible suspected source was superficial thrombophlebitis (seen on U/S in the left cephalic vein). ID recommended NM whole body scan/ indium scan and ELICEO to r/o other source of persistent bacteremia, however pt was unable to lie flat given her respiratory status and these studies were not pursued (At the time of transfer, pt remained on dapto, abeba, and rifampine)  Pt hospital course was further complicated by hypercalcemia likely PTH-mediated ISO a L parathyroid adenoma. She was started on IVF and given Alendronate x1 with improvement. Palliative care was also consulted for multimodal pain management for diffuse arthralgias/myalgias possible 2/2 RA vs fibromyalgia     (09 May 2023 23:13)    Patient seen at bedside, lying in bed on HFNC 70%.  Conversing in full sentences.  Patient states she has been oxygen dependent x 2 years on3.5L NC.  She was in and out of the hospital over the last month.  She is unable to exercise, as she desats to 60% with minimal exertion.  Dr. Veloz had lengthy discussion with  at bedside, about lung transplantation and barriers to transplant are increased BMI and limited functional mobility.       PAST MEDICAL & SURGICAL HISTORY:  ILD (interstitial lung disease)  HTN (hypertension)  Type 2 diabetes mellitus  ALMA (obstructive sleep apnea)  H/O pulmonary hypertension    Home Meds: Home Medications:  Acidophilus Extra Strength oral capsule: 2 tab(s) orally 3 times a day (10 May 2023 01:18)  Admelog 100 units/mL injectable solution: 5 unit(s) injectable 3 times a day (before meals) (10 May 2023 01:12)  amitriptyline 10 mg oral tablet: 1 tab(s) orally once a day (at bedtime) (10 May 2023 01:18)  Anusol-HC 25 mg rectal suppository: 1 suppository(ies) rectally 2 times a day as needed for pain (10 May 2023 01:16)  bisacodyl 10 mg rectal suppository: 1 suppository(ies) rectally once a day as needed for  constipation (10 May 2023 01:15)  cholecalciferol 25 mcg (1000 intl units) oral tablet: 1 tab(s) orally once a day (10 May 2023 01:18)  DAPTOmycin 500 mg intravenous injection: 4 intravenously once a day Pt in 650mg daily (10 May 2023 01:14)  DULoxetine 30 mg oral delayed release capsule: 2 cap(s) orally once a day (10 May 2023 01:04)  insulin glargine 100 units/mL subcutaneous solution: 12 unit(s) subcutaneous once a day (at bedtime) (10 May 2023 01:07)  meropenem 1000 mg/ 50 mL-NaCl 0.9% intravenous solution: 1,000 intravenously every 8 hours (10 May 2023 01:06)  metoprolol tartrate 25 mg oral tablet: 1 orally 3 times a day (10 May 2023 01:03)  naproxen 500 mg oral tablet: 1 orally 2 times a day as needed for  moderate pain (10 May 2023 01:17)  NIFEdipine 30 mg oral tablet, extended release: 1 tab(s) orally once a day (10 May 2023 01:09)  predniSONE 20 mg oral tablet: 2 tab(s) orally once a day (10 May 2023 01:11)  rifAMPin 300 mg oral capsule: 1 cap(s) orally 2 times a day (10 May 2023 01:02)  torsemide 10 mg oral tablet: 1 orally once a day (10 May 2023 01:10)    Allergies: Allergies    penicillins (Other)    Intolerances      Soc:   Advanced Directives: Presumed Full Code     ROS:    REVIEW OF SYSTEMS    [x ] A ten-point review of systems was otherwise negative except as noted.  [ ] Due to altered mental status/intubation, subjective information were not able to be obtained from the patient. History was obtained, to the extent possible, from review of the chart and collateral sources of information.      CURRENT MEDICATIONS:   --------------------------------------------------------------------------------------  Neurologic Medications  amitriptyline 10 milliGRAM(s) Oral at bedtime  DULoxetine 30 milliGRAM(s) Oral daily    Respiratory Medications  albuterol/ipratropium for Nebulization 3 milliLiter(s) Nebulizer every 6 hours PRN Shortness of Breath and/or Wheezing  guaiFENesin Oral Liquid (Sugar-Free) 200 milliGRAM(s) Oral every 6 hours PRN Cough  hydrocodone/homatropine Syrup 5 milliLiter(s) Oral every 8 hours    Cardiovascular Medications  metoprolol tartrate 25 milliGRAM(s) Oral three times a day  sildenafil (REVATIO) 20 milliGRAM(s) Oral every 8 hours    Gastrointestinal Medications  bisacodyl Suppository 10 milliGRAM(s) Rectal daily PRN Constipation  cholecalciferol 1000 Unit(s) Oral daily  dextrose 5%. 1000 milliLiter(s) IV Continuous <Continuous>  dextrose 5%. 1000 milliLiter(s) IV Continuous <Continuous>  pantoprazole  Injectable 40 milliGRAM(s) IV Push every 12 hours    Genitourinary Medications    Hematologic/Oncologic Medications    Antimicrobial/Immunologic Medications  trimethoprim  40 mG/sulfamethoxazole 200 mG Suspension 400 milliGRAM(s) Oral every 8 hours    Endocrine/Metabolic Medications  atorvastatin 20 milliGRAM(s) Oral at bedtime  dextrose 50% Injectable 25 Gram(s) IV Push once  dextrose 50% Injectable 25 Gram(s) IV Push once  dextrose 50% Injectable 12.5 Gram(s) IV Push once  dextrose Oral Gel 15 Gram(s) Oral once PRN Blood Glucose LESS THAN 70 milliGRAM(s)/deciliter  glucagon  Injectable 1 milliGRAM(s) IntraMuscular once  insulin glargine Injectable (LANTUS) 14 Unit(s) SubCutaneous at bedtime  insulin lispro (ADMELOG) corrective regimen sliding scale   SubCutaneous three times a day before meals  insulin lispro (ADMELOG) corrective regimen sliding scale   SubCutaneous at bedtime  insulin lispro Injectable (ADMELOG) 7 Unit(s) SubCutaneous three times a day before meals  methylPREDNISolone sodium succinate Injectable 40 milliGRAM(s) IV Push daily    Topical/Other Medications  chlorhexidine 2% Cloths 1 Application(s) Topical <User Schedule>  fluticasone propionate 50 MICROgram(s)/spray Nasal Spray 1 Spray(s) Both Nostrils two times a day  lactobacillus acidophilus 1 Tablet(s) Oral three times a day with meals  lidocaine   4% Patch 1 Patch Transdermal daily  sodium chloride 0.65% Nasal 1 Spray(s) Both Nostrils two times a day    --------------------------------------------------------------------------------------    VITAL SIGNS, INS/OUTS (last 24 hours):  --------------------------------------------------------------------------------------  ICU Vital Signs Last 24 Hrs  T(C): 36.2 (17 May 2023 12:00), Max: 37.1 (16 May 2023 20:00)  T(F): 97.2 (17 May 2023 12:00), Max: 98.8 (16 May 2023 20:00)  HR: 86 (17 May 2023 15:00) (66 - 96)  BP: --  BP(mean): --  ABP: 108/51 (17 May 2023 15:00) (89/48 - 147/75)  ABP(mean): 72 (17 May 2023 15:00) (59 - 101)  RR: 22 (17 May 2023 15:18) (14 - 36)  SpO2: 96% (17 May 2023 15:18) (84% - 100%)    O2 Parameters below as of 17 May 2023 15:18  Patient On (Oxygen Delivery Method): nasal cannula, high flow  O2 Flow (L/min): 60  O2 Concentration (%): 75      I&O's Summary    16 May 2023 07:01  -  17 May 2023 07:00  --------------------------------------------------------  IN: 212 mL / OUT: 1945 mL / NET: -1733 mL    17 May 2023 07:01  -  17 May 2023 16:01  --------------------------------------------------------  IN: 100 mL / OUT: 585 mL / NET: -485 mL      --------------------------------------------------------------------------------------    EXAM:  General: HFNC 70%  Chest: appears normal  Respiratory: decreased breath sounds bilaterally  Cardiac: RRR  Gastrointestinal: obese, soft, non distended, non tender  Extremities: warm and well perfused, no peripheral edema   Neuro: AAO x 3   --------------------------------------------------------------------------------------    OTHER LABS and IMAGING RESULTS: REVIEWED WITH DR. VELOZ

## 2023-05-17 NOTE — CONSULT NOTE ADULT - SUBJECTIVE AND OBJECTIVE BOX
Date of Service 05-17-23 @ 13:33    HPI:  62 year old female with a PMHx of IDL on 3.5L home O2, ALMA on CPAP and chronic prednisone, pulmonary HTN, T2DM, RA, psoriatic arthritis who initially present to [facility] for worsening dyspnea/ hypoxia requiring HFCA and BIPAP and was transferred to [facility] further management She has had multiple recent hospitalizations for acute on chronic hypoxic respiratory failure 2/2 IDL flair/ PNA. During her hospitalization she had a rapid response called (on 4/21) for left facial droop and AMS c/f possible stroke. Imagining at the time demonstrated high grade L sided carotid stenosis for which pt and family decided not to pursue surgical intervention.   Pt was then found to be febrile and tachycardic, and was transferred to ICU for sepsis with multifactorial encephalopathy and AHRF. Pts blood cultures grew MRSA and she was initially treated with vancomycin (4/21-4/25 and 5/3-5/8) but continued to have positive blood cultures x5. She was also started on Merrem (4/21-4/23) and subsequently started on Daptomycin on 4/25 with dose increase on 5/1. Plan was to continue the Daptomycin for a 6 week course and Rifampin (started on 5/1) for a 14 day course. Of note, pt had negative blood cx x2 on 5/4/23. Pt was found to be febrile again on 5/8 with a rectal temp of 100.7. Fungitell was ordered and pt was started on Meropenem. Possible suspected source was superficial thrombophlebitis (seen on U/S in the left cephalic vein). ID recommended NM whole body scan/ indium scan and ELICEO to r/o other source of persistent bacteremia, however pt was unable to lie flat given her respiratory status and these studies were not pursued (At the time of transfer, pt remained on dapto, abeba, and rifampine)  Pt hospital course was further complicated by hypercalcemia likely PTH-mediated ISO a L parathyroid adenoma. She was started on IVF and given Alendronate x1 with improvement. Palliative care was also consulted for multimodal pain management for diffuse arthralgias/myalgias possible 2/2 RA vs fibromyalgia     (09 May 2023 23:13)    Interval History:       PERTINENT PM/SXH:   ILD (interstitial lung disease)  HTN (hypertension)  Type 2 diabetes mellitus  ALMA on CPAP  ALMA (obstructive sleep apnea)  H/O pulmonary hypertension    FAMILY HISTORY:  Son- Passed away from Lymphoma     ITEMS NOT CHECKED ARE NOT PRESENT    SOCIAL HISTORY:   Significant other/partner[x ]  Children[x ]  Episcopalian/Spirituality: Yazidi   Substance hx:  [ ]   Tobacco hx:  [ ]   Alcohol hx: [ ]   Home Opioid hx:  [ ] I-Stop Reference No:  Living Situation: [ x]Home  [ ]Long term care  [ ]Rehab [ ]Other      ADVANCE DIRECTIVES:    MOLST  [ ]  Living Will  [ ]   DECISION MAKER(s):  [x ] Health Care Proxy(s)  [ ] Surrogate(s)  [ ] Guardian           Name(s): Phone Number(s):  Daughter Joyce Kolb: [phone]    BASELINE (I)ADL(s) (prior to admission):  [place]: [ ]Total  [ x] Moderate [ ]Dependent    Allergies    penicillins (Other)    Intolerances    MEDICATIONS  (STANDING):  amitriptyline 10 milliGRAM(s) Oral at bedtime  atorvastatin 20 milliGRAM(s) Oral at bedtime  chlorhexidine 2% Cloths 1 Application(s) Topical <User Schedule>  cholecalciferol 1000 Unit(s) Oral daily  dextrose 5%. 1000 milliLiter(s) (50 mL/Hr) IV Continuous <Continuous>  dextrose 5%. 1000 milliLiter(s) (100 mL/Hr) IV Continuous <Continuous>  dextrose 50% Injectable 12.5 Gram(s) IV Push once  dextrose 50% Injectable 25 Gram(s) IV Push once  dextrose 50% Injectable 25 Gram(s) IV Push once  DULoxetine 30 milliGRAM(s) Oral daily  fluticasone propionate 50 MICROgram(s)/spray Nasal Spray 1 Spray(s) Both Nostrils two times a day  glucagon  Injectable 1 milliGRAM(s) IntraMuscular once  hydrocodone/homatropine Syrup 5 milliLiter(s) Oral every 8 hours  insulin glargine Injectable (LANTUS) 14 Unit(s) SubCutaneous at bedtime  insulin lispro (ADMELOG) corrective regimen sliding scale   SubCutaneous at bedtime  insulin lispro (ADMELOG) corrective regimen sliding scale   SubCutaneous three times a day before meals  insulin lispro Injectable (ADMELOG) 7 Unit(s) SubCutaneous three times a day before meals  lactobacillus acidophilus 1 Tablet(s) Oral three times a day with meals  lidocaine   4% Patch 1 Patch Transdermal daily  methylPREDNISolone sodium succinate Injectable 40 milliGRAM(s) IV Push daily  metoprolol tartrate 25 milliGRAM(s) Oral three times a day  pantoprazole  Injectable 40 milliGRAM(s) IV Push every 12 hours  sildenafil (REVATIO) 20 milliGRAM(s) Oral every 8 hours  sodium chloride 0.65% Nasal 1 Spray(s) Both Nostrils two times a day  trimethoprim  40 mG/sulfamethoxazole 200 mG Suspension 400 milliGRAM(s) Oral every 8 hours    MEDICATIONS  (PRN):  albuterol/ipratropium for Nebulization 3 milliLiter(s) Nebulizer every 6 hours PRN Shortness of Breath and/or Wheezing  bisacodyl Suppository 10 milliGRAM(s) Rectal daily PRN Constipation  dextrose Oral Gel 15 Gram(s) Oral once PRN Blood Glucose LESS THAN 70 milliGRAM(s)/deciliter  guaiFENesin Oral Liquid (Sugar-Free) 200 milliGRAM(s) Oral every 6 hours PRN Cough        ITEMS UNCHECKED ARE NOT PRESENT     PRESENT SYMPTOMS: [ ]Unable to self-report due to altered mental status  [ ] CPOT [ ] PAINADs [ ] RDOS  Source if other than patient:  [ ]Family   [ ]Team     Pain: [x ]yes [ ]no  QOL impact -   Location -      back              Aggravating factors - movement  Quality -  Radiation - none  Timing- intermittent   Severity (0-10 scale): 6   Minimal acceptable level / Pain goal (0-10 scale): 3    CPOT:    https://[email or web address]/[email or web address]/Critical-Care-Pain-Observation-Tool-(CPOT)    Dyspnea:                           [ x]Mild [ ]Moderate [ ]Severe  Anxiety:                             [x ]Mild [ ]Moderate [ ]Severe  Agitation:                          [ ]Mild [ ]Moderate [ ]Severe  Fatigue:                             [ ]Mild [ ]Moderate [ ]Severe  Nausea:                             [ ]Mild [ ]Moderate [ ]Severe  Loss of appetite:              [ ]Mild [ ]Moderate [ ]Severe  Constipation:                   [ ]Mild [ ]Moderate [ ]Severe  Diarrhea:                          [ ]Mild [ ]Moderate [ ]Severe      PCSSQ[Palliative Care Spiritual Screening Question]   Severity (0-10):  Score of 4 or > indicate consideration of Chaplaincy referral.  Chaplaincy Referral: [ ] yes [x ] declined [ ] following [ x] deferred    Caregiver [place]? : [x ] yes [ ] no [ ] Declined   [ ] Deferred            Social work referral [ x] Patient & Family Centered Care Referral [ ]     Anticipatory Grief present?:  [ x] yes [ ] no  [ ] Deferred                  Social work referral [ x] Chaplaincy Referral[ ]    Other Symptoms:  [x ]All other review of systems negative     PHYSICAL EXAM:  Vital Signs Last 24 Hrs  T(C): 36.2 (17 May 2023 12:00), Max: 37.1 (16 May 2023 20:00)  T(F): 97.2 (17 May 2023 12:00), Max: 98.8 (16 May 2023 20:00)  HR: 83 (17 May 2023 12:00) (63 - 96)  BP: --  BP(mean): --  RR: 23 (17 May 2023 12:00) (14 - 36)  SpO2: 100% (17 May 2023 12:00) (89% - 100%)    Parameters below as of 17 May 2023 12:00  Patient On (Oxygen Delivery Method): nasal cannula, high flow  O2 Flow (L/min): 60  O2 Concentration (%): 75     I&O's Summary    16 May 2023 07:01  -  17 May 2023 07:00  --------------------------------------------------------  IN: 212 mL / OUT: 1945 mL / NET: -1733 mL    17 May 2023 07:01  -  17 May 2023 13:33  --------------------------------------------------------  IN: 100 mL / OUT: 345 mL / NET: -245 mL        GENERAL:  [x ]Alert  [x ]Oriented   [ ]Lethargic  [ ]Cachexia  [ ]Unarousable  [x ]Verbal  [ ]Non-Verbal    Behavioral:   [x ] Anxiety  [ ] Delirium [ ] Agitation [ ] Calm  [ ] Other  HEENT:  [ ]Normal  [ ] Temporal Wasting  [ ]Dry mouth   [ ]ET Tube/Trach  [ ]Oral lesions  [ ] Mucositis  PULMONARY:   [ ]Clear [ x]Tachypnea  [ ]Audible excessive secretions   [ x]Rhonchi        [ ]Right [ ]Left [ x]Bilateral  [ ]Crackles        [ ]Right [ ]Left [ ]Bilateral  [ ]Wheezing     [ ]Right [ ]Left [ ]Bilateral  [ ]Diminished breath sounds [ ]right [ ]left [ ]bilateral  CARDIOVASCULAR:    [x ]Regular [ ]Irregular [ ]Tachy  [ ]Greg [ ]Murmur [ ]Other  GASTROINTESTINAL:  [x ]Soft  [ ]Distended   [ ]+BS  [x ]Non tender [ ]Tender  [ ]PEG [ ]OGT/ NGT  Last BM: 5/15  GENITOURINARY:  [ ]Normal [ ] Incontinent   [ ]Oliguria/Anuria   [x ]Aceves  MUSCULOSKELETAL:   [ ]Normal   [x ]Weakness  [x ]Bed/Wheelchair bound [ ]Edema  [  ] amputation  [  ] contraction  NEUROLOGIC:   [ x]No focal deficits  [ ]Cognitive impairment  [ ]Dysphagia [ ]Dysarthria [ ]Paresis [ ]Other   SKIN: See Nursing Skin Assessment for further details  [x ]Normal    [ ]Rash  [ ]Pressure ulcer(s)       Present on admission [ ]y [ ]n   [  ]  Wound    [  ] hyperpigmentation    CRITICAL CARE:  [ ] Shock Present  [ ]Septic [ ]Cardiogenic [ ]Neurologic [ ]Hypovolemic  [ ]  Vasopressors [ ]  Inotropes   [x ]Respiratory failure present [ ]Mechanical ventilation [ ]Non-invasive ventilatory support [ x]High flow    [ ]Acute  [ ]Chronic [ x]Hypoxic  [ ]Hypercarbic [ ]Other  [ ]Other organ failure     LABS:  reviewed                         7.9    14.51 )-----------( 250      ( 17 May 2023 01:41 )             25.0   05-17    132<L>  |  94<L>  |  35<H>  ----------------------------<  184<H>  3.9   |  26  |  1.28    Ca    9.2      17 May 2023 01:41  Phos  3.6     05-17  Mg     2.50     05-17    TPro  6.0  /  Alb  2.6<L>  /  TBili  0.3  /  DBili  x   /  AST  17  /  ALT  13  /  AlkPhos  132<H>  05-17      CAPILLARY BLOOD GLUCOSE      POCT Blood Glucose.: 236 mg/dL (17 May 2023 12:18)  POCT Blood Glucose.: 147 mg/dL (17 May 2023 08:17)  POCT Blood Glucose.: 100 mg/dL (17 May 2023 05:58)  POCT Blood Glucose.: 148 mg/dL (16 May 2023 22:39)  POCT Blood Glucose.: 162 mg/dL (16 May 2023 17:09)  POCT Blood Glucose.: 49 mg/dL (16 May 2023 17:06)      RADIOLOGY & ADDITIONAL STUDIES: reviewed     PROTEIN CALORIE MALNUTRITION PRESENT: [ ]mild [ ]moderate [ ]severe [ ]underweight [ ]morbid obesity  https://www.andeal.org/vault/2440/web/files/ONC/Table_Clinical%20Characteristics%20to%20Document%20Malnutrition-White%20JV%20et%20al%471496.pdf    Height (cm): 162.6 (05-09-23 @ 23:10)  Weight (kg): 92.9 (05-09-23 @ 23:10)  BMI (kg/m2): 35.1 (05-09-23 @ 23:10)    [ ]PPSV2 < or = to 30% [ ]significant weight loss  [ ]poor nutritional intake  [ ]anasarca [ ]Artificial Nutrition      REFERRALS:   [ ]Chaplaincy  [ ]Hospice  [ ]Child Life  [x ]Social Work  [ x]Case management [ ]Holistic Therapy      Date of Service 05-17-23 @ 13:33    HPI:  62 year old female with a PMHx of IDL on 3.5L home O2, ALMA on CPAP and chronic prednisone, pulmonary HTN, T2DM, RA, psoriatic arthritis who initially present to [facility] for worsening dyspnea/ hypoxia requiring HFCA and BIPAP and was transferred to [facility] further management She has had multiple recent hospitalizations for acute on chronic hypoxic respiratory failure 2/2 IDL flair/ PNA. During her hospitalization she had a rapid response called (on 4/21) for left facial droop and AMS c/f possible stroke. Imagining at the time demonstrated high grade L sided carotid stenosis for which pt and family decided not to pursue surgical intervention.   Pt was then found to be febrile and tachycardic, and was transferred to ICU for sepsis with multifactorial encephalopathy and AHRF. Pts blood cultures grew MRSA and she was initially treated with vancomycin (4/21-4/25 and 5/3-5/8) but continued to have positive blood cultures x5. She was also started on Merrem (4/21-4/23) and subsequently started on Daptomycin on 4/25 with dose increase on 5/1. Plan was to continue the Daptomycin for a 6 week course and Rifampin (started on 5/1) for a 14 day course. Of note, pt had negative blood cx x2 on 5/4/23. Pt was found to be febrile again on 5/8 with a rectal temp of 100.7. Fungitell was ordered and pt was started on Meropenem. Possible suspected source was superficial thrombophlebitis (seen on U/S in the left cephalic vein). ID recommended NM whole body scan/ indium scan and ELICEO to r/o other source of persistent bacteremia, however pt was unable to lie flat given her respiratory status and these studies were not pursued (At the time of transfer, pt remained on dapto, abeba, and rifampine)  Pt hospital course was further complicated by hypercalcemia likely PTH-mediated ISO a L parathyroid adenoma. She was started on IVF and given Alendronate x1 with improvement. Palliative care was also consulted for multimodal pain management for diffuse arthralgias/myalgias possible 2/2 RA vs fibromyalgia     (09 May 2023 23:13)    PERTINENT PM/SXH:   ILD (interstitial lung disease)  HTN (hypertension)  Type 2 diabetes mellitus  ALMA on CPAP  ALMA (obstructive sleep apnea)  H/O pulmonary hypertension    FAMILY HISTORY:  Son- Passed away from Lymphoma     ITEMS NOT CHECKED ARE NOT PRESENT    SOCIAL HISTORY:   Significant other/partner[x ]  Children[x ]  Christianity/Spirituality: Shinto   Substance hx:  [ ]   Tobacco hx:  [ ]   Alcohol hx: [ ]   Home Opioid hx:  [ ] I-Stop Reference No:  Living Situation: [ x]Home  [ ]Long term care  [ ]Rehab [ ]Other      ADVANCE DIRECTIVES:    MOLST  [ ]  Living Will  [ ]   DECISION MAKER(s):  [x ] Health Care Proxy(s)  [ ] Surrogate(s)  [ ] Guardian           Name(s): Phone Number(s):  Daughter Joyce Kolb: [phone]    BASELINE (I)ADL(s) (prior to admission):  [place]: [ ]Total  [ x] Moderate [ ]Dependent    Allergies    penicillins (Other)    Intolerances    MEDICATIONS  (STANDING):  amitriptyline 10 milliGRAM(s) Oral at bedtime  atorvastatin 20 milliGRAM(s) Oral at bedtime  chlorhexidine 2% Cloths 1 Application(s) Topical <User Schedule>  cholecalciferol 1000 Unit(s) Oral daily  dextrose 5%. 1000 milliLiter(s) (50 mL/Hr) IV Continuous <Continuous>  dextrose 5%. 1000 milliLiter(s) (100 mL/Hr) IV Continuous <Continuous>  dextrose 50% Injectable 12.5 Gram(s) IV Push once  dextrose 50% Injectable 25 Gram(s) IV Push once  dextrose 50% Injectable 25 Gram(s) IV Push once  DULoxetine 30 milliGRAM(s) Oral daily  fluticasone propionate 50 MICROgram(s)/spray Nasal Spray 1 Spray(s) Both Nostrils two times a day  glucagon  Injectable 1 milliGRAM(s) IntraMuscular once  hydrocodone/homatropine Syrup 5 milliLiter(s) Oral every 8 hours  insulin glargine Injectable (LANTUS) 14 Unit(s) SubCutaneous at bedtime  insulin lispro (ADMELOG) corrective regimen sliding scale   SubCutaneous at bedtime  insulin lispro (ADMELOG) corrective regimen sliding scale   SubCutaneous three times a day before meals  insulin lispro Injectable (ADMELOG) 7 Unit(s) SubCutaneous three times a day before meals  lactobacillus acidophilus 1 Tablet(s) Oral three times a day with meals  lidocaine   4% Patch 1 Patch Transdermal daily  methylPREDNISolone sodium succinate Injectable 40 milliGRAM(s) IV Push daily  metoprolol tartrate 25 milliGRAM(s) Oral three times a day  pantoprazole  Injectable 40 milliGRAM(s) IV Push every 12 hours  sildenafil (REVATIO) 20 milliGRAM(s) Oral every 8 hours  sodium chloride 0.65% Nasal 1 Spray(s) Both Nostrils two times a day  trimethoprim  40 mG/sulfamethoxazole 200 mG Suspension 400 milliGRAM(s) Oral every 8 hours    MEDICATIONS  (PRN):  albuterol/ipratropium for Nebulization 3 milliLiter(s) Nebulizer every 6 hours PRN Shortness of Breath and/or Wheezing  bisacodyl Suppository 10 milliGRAM(s) Rectal daily PRN Constipation  dextrose Oral Gel 15 Gram(s) Oral once PRN Blood Glucose LESS THAN 70 milliGRAM(s)/deciliter  guaiFENesin Oral Liquid (Sugar-Free) 200 milliGRAM(s) Oral every 6 hours PRN Cough        ITEMS UNCHECKED ARE NOT PRESENT     PRESENT SYMPTOMS: [ ]Unable to self-report due to altered mental status  [ ] CPOT [ ] PAINADs [ ] RDOS  Source if other than patient:  [ ]Family   [ ]Team     Pain: [x ]yes [ ]no  QOL impact -   Location -      back              Aggravating factors - movement  Quality -  Radiation - none  Timing- intermittent   Severity (0-10 scale): 6   Minimal acceptable level / Pain goal (0-10 scale): 3    CPOT:    https://www.[email or web address]/getattachment/[email or web address]/Critical-Care-Pain-Observation-Tool-(CPOT)    Dyspnea:                           [ x]Mild [ ]Moderate [ ]Severe  Anxiety:                             [x ]Mild [ ]Moderate [ ]Severe  Agitation:                          [ ]Mild [ ]Moderate [ ]Severe  Fatigue:                             [ ]Mild [ ]Moderate [ ]Severe  Nausea:                             [ ]Mild [ ]Moderate [ ]Severe  Loss of appetite:              [ ]Mild [ ]Moderate [ ]Severe  Constipation:                   [ ]Mild [ ]Moderate [ ]Severe  Diarrhea:                          [ ]Mild [ ]Moderate [ ]Severe      PCSSQ[Palliative Care Spiritual Screening Question]   Severity (0-10):  Score of 4 or > indicate consideration of Chaplaincy referral.  Chaplaincy Referral: [ ] yes [x ] declined [ ] following [ x] deferred    Caregiver [place]? : [x ] yes [ ] no [ ] Declined   [ ] Deferred            Social work referral [ x] Patient & Family Centered Care Referral [ ]     Anticipatory Grief present?:  [ x] yes [ ] no  [ ] Deferred                  Social work referral [ x] Chaplaincy Referral[ ]    Other Symptoms:  [x ]All other review of systems negative     PHYSICAL EXAM:  Vital Signs Last 24 Hrs  T(C): 36.2 (17 May 2023 12:00), Max: 37.1 (16 May 2023 20:00)  T(F): 97.2 (17 May 2023 12:00), Max: 98.8 (16 May 2023 20:00)  HR: 83 (17 May 2023 12:00) (63 - 96)  BP: --  BP(mean): --  RR: 23 (17 May 2023 12:00) (14 - 36)  SpO2: 100% (17 May 2023 12:00) (89% - 100%)    Parameters below as of 17 May 2023 12:00  Patient On (Oxygen Delivery Method): nasal cannula, high flow  O2 Flow (L/min): 60  O2 Concentration (%): 75     I&O's Summary    16 May 2023 07:01  -  17 May 2023 07:00  --------------------------------------------------------  IN: 212 mL / OUT: 1945 mL / NET: -1733 mL    17 May 2023 07:01  -  17 May 2023 13:33  --------------------------------------------------------  IN: 100 mL / OUT: 345 mL / NET: -245 mL        GENERAL:  [x ]Alert  [x ]Oriented   [ ]Lethargic  [ ]Cachexia  [ ]Unarousable  [x ]Verbal  [ ]Non-Verbal    Behavioral:   [x ] Anxiety  [ ] Delirium [ ] Agitation [ ] Calm  [ ] Other  HEENT:  [ ]Normal  [ ] Temporal Wasting  [ ]Dry mouth   [ ]ET Tube/Trach  [ ]Oral lesions  [ ] Mucositis  PULMONARY:   [ ]Clear [ x]Tachypnea  [ ]Audible excessive secretions   [ x]Rhonchi        [ ]Right [ ]Left [ x]Bilateral  [ ]Crackles        [ ]Right [ ]Left [ ]Bilateral  [ ]Wheezing     [ ]Right [ ]Left [ ]Bilateral  [ ]Diminished breath sounds [ ]right [ ]left [ ]bilateral  CARDIOVASCULAR:    [x ]Regular [ ]Irregular [ ]Tachy  [ ]Greg [ ]Murmur [ ]Other  GASTROINTESTINAL:  [x ]Soft  [ ]Distended   [ ]+BS  [x ]Non tender [ ]Tender  [ ]PEG [ ]OGT/ NGT  Last BM: 5/15  GENITOURINARY:  [ ]Normal [ ] Incontinent   [ ]Oliguria/Anuria   [x ]Aceves  MUSCULOSKELETAL:   [ ]Normal   [x ]Weakness  [x ]Bed/Wheelchair bound [ ]Edema  [  ] amputation  [  ] contraction  NEUROLOGIC:   [ x]No focal deficits  [ ]Cognitive impairment  [ ]Dysphagia [ ]Dysarthria [ ]Paresis [ ]Other   SKIN: See Nursing Skin Assessment for further details  [x ]Normal    [ ]Rash  [ ]Pressure ulcer(s)       Present on admission [ ]y [ ]n   [  ]  Wound    [  ] hyperpigmentation    CRITICAL CARE:  [ ] Shock Present  [ ]Septic [ ]Cardiogenic [ ]Neurologic [ ]Hypovolemic  [ ]  Vasopressors [ ]  Inotropes   [x ]Respiratory failure present [ ]Mechanical ventilation [ ]Non-invasive ventilatory support [ x]High flow    [ ]Acute  [ ]Chronic [ x]Hypoxic  [ ]Hypercarbic [ ]Other  [ ]Other organ failure     LABS:  reviewed                         7.9    14.51 )-----------( 250      ( 17 May 2023 01:41 )             25.0   05-17    132<L>  |  94<L>  |  35<H>  ----------------------------<  184<H>  3.9   |  26  |  1.28    Ca    9.2      17 May 2023 01:41  Phos  3.6     05-17  Mg     2.50     05-17    TPro  6.0  /  Alb  2.6<L>  /  TBili  0.3  /  DBili  x   /  AST  17  /  ALT  13  /  AlkPhos  132<H>  05-17      CAPILLARY BLOOD GLUCOSE      POCT Blood Glucose.: 236 mg/dL (17 May 2023 12:18)  POCT Blood Glucose.: 147 mg/dL (17 May 2023 08:17)  POCT Blood Glucose.: 100 mg/dL (17 May 2023 05:58)  POCT Blood Glucose.: 148 mg/dL (16 May 2023 22:39)  POCT Blood Glucose.: 162 mg/dL (16 May 2023 17:09)  POCT Blood Glucose.: 49 mg/dL (16 May 2023 17:06)      RADIOLOGY & ADDITIONAL STUDIES: reviewed     PROTEIN CALORIE MALNUTRITION PRESENT: [ ]mild [ ]moderate [ ]severe [ ]underweight [ ]morbid obesity  https://www.andeal.org/vault/2440/web/files/ONC/Table_Clinical%20Characteristics%20to%20Document%20Malnutrition-White%20JV%20et%20al%202012.pdf    Height (cm): 162.6 (05-09-23 @ 23:10)  Weight (kg): 92.9 (05-09-23 @ 23:10)  BMI (kg/m2): 35.1 (05-09-23 @ 23:10)    [ ]PPSV2 < or = to 30% [ ]significant weight loss  [ ]poor nutritional intake  [ ]anasarca [ ]Artificial Nutrition      REFERRALS:   [ ]Chaplaincy  [ ]Hospice  [ ]Child Life  [x ]Social Work  [ x]Case management [ ]Holistic Therapy      Date of Service 05-17-23 @ 13:33    HPI:  62 year old female with a PMHx of IDL on 3.5L home O2, ALMA on CPAP and chronic prednisone, pulmonary HTN, T2DM, RA, psoriatic arthritis who initially present to [facility] for worsening dyspnea/ hypoxia requiring HFCA and BIPAP and was transferred to [facility] further management She has had multiple recent hospitalizations for acute on chronic hypoxic respiratory failure 2/2 IDL flair/ PNA. During her hospitalization she had a rapid response called (on 4/21) for left facial droop and AMS c/f possible stroke. Imagining at the time demonstrated high grade L sided carotid stenosis for which pt and family decided not to pursue surgical intervention.   Pt was then found to be febrile and tachycardic, and was transferred to ICU for sepsis with multifactorial encephalopathy and AHRF. Pts blood cultures grew MRSA and she was initially treated with vancomycin (4/21-4/25 and 5/3-5/8) but continued to have positive blood cultures x5. She was also started on Merrem (4/21-4/23) and subsequently started on Daptomycin on 4/25 with dose increase on 5/1. Plan was to continue the Daptomycin for a 6 week course and Rifampin (started on 5/1) for a 14 day course. Of note, pt had negative blood cx x2 on 5/4/23. Pt was found to be febrile again on 5/8 with a rectal temp of 100.7. Fungitell was ordered and pt was started on Meropenem. Possible suspected source was superficial thrombophlebitis (seen on U/S in the left cephalic vein). ID recommended NM whole body scan/ indium scan and ELICEO to r/o other source of persistent bacteremia, however pt was unable to lie flat given her respiratory status and these studies were not pursued (At the time of transfer, pt remained on dapto, abeba, and rifampine)  Pt hospital course was further complicated by hypercalcemia likely PTH-mediated ISO a L parathyroid adenoma. She was started on IVF and given Alendronate x1 with improvement. Palliative care was also consulted for multimodal pain management for diffuse arthralgias/myalgias possible 2/2 RA vs fibromyalgia     (09 May 2023 23:13)    PERTINENT PM/SXH:   ILD (interstitial lung disease)  HTN (hypertension)  Type 2 diabetes mellitus  ALMA on CPAP  ALMA (obstructive sleep apnea)  H/O pulmonary hypertension    FAMILY HISTORY:  Son- Passed away from Lymphoma     ITEMS NOT CHECKED ARE NOT PRESENT    SOCIAL HISTORY:   Significant other/partner[x ]  Children[x ]  Moravian/Spirituality: Church   Substance hx:  [ ]   Tobacco hx:  [ ]   Alcohol hx: [ ]   Home Opioid hx:  [ ] I-Stop Reference No:  Living Situation: [ x]Home  [ ]Long term care  [ ]Rehab [ ]Other      ADVANCE DIRECTIVES:    MOLST  [ ]  Living Will  [ ]   DECISION MAKER(s):  [x ] Health Care Proxy(s)  [ ] Surrogate(s)  [ ] Guardian           Name(s): Phone Number(s):  Daughter Joyce Kolb: [phone]    BASELINE (I)ADL(s) (prior to admission):  [place]: [ ]Total  [ x] Moderate [ ]Dependent    Allergies    penicillins (Other)    Intolerances    MEDICATIONS  (STANDING):  amitriptyline 10 milliGRAM(s) Oral at bedtime  atorvastatin 20 milliGRAM(s) Oral at bedtime  chlorhexidine 2% Cloths 1 Application(s) Topical <User Schedule>  cholecalciferol 1000 Unit(s) Oral daily  dextrose 5%. 1000 milliLiter(s) (50 mL/Hr) IV Continuous <Continuous>  dextrose 5%. 1000 milliLiter(s) (100 mL/Hr) IV Continuous <Continuous>  dextrose 50% Injectable 12.5 Gram(s) IV Push once  dextrose 50% Injectable 25 Gram(s) IV Push once  dextrose 50% Injectable 25 Gram(s) IV Push once  DULoxetine 30 milliGRAM(s) Oral daily  fluticasone propionate 50 MICROgram(s)/spray Nasal Spray 1 Spray(s) Both Nostrils two times a day  glucagon  Injectable 1 milliGRAM(s) IntraMuscular once  hydrocodone/homatropine Syrup 5 milliLiter(s) Oral every 8 hours  insulin glargine Injectable (LANTUS) 14 Unit(s) SubCutaneous at bedtime  insulin lispro (ADMELOG) corrective regimen sliding scale   SubCutaneous at bedtime  insulin lispro (ADMELOG) corrective regimen sliding scale   SubCutaneous three times a day before meals  insulin lispro Injectable (ADMELOG) 7 Unit(s) SubCutaneous three times a day before meals  lactobacillus acidophilus 1 Tablet(s) Oral three times a day with meals  lidocaine   4% Patch 1 Patch Transdermal daily  methylPREDNISolone sodium succinate Injectable 40 milliGRAM(s) IV Push daily  metoprolol tartrate 25 milliGRAM(s) Oral three times a day  pantoprazole  Injectable 40 milliGRAM(s) IV Push every 12 hours  sildenafil (REVATIO) 20 milliGRAM(s) Oral every 8 hours  sodium chloride 0.65% Nasal 1 Spray(s) Both Nostrils two times a day  trimethoprim  40 mG/sulfamethoxazole 200 mG Suspension 400 milliGRAM(s) Oral every 8 hours    MEDICATIONS  (PRN):  albuterol/ipratropium for Nebulization 3 milliLiter(s) Nebulizer every 6 hours PRN Shortness of Breath and/or Wheezing  bisacodyl Suppository 10 milliGRAM(s) Rectal daily PRN Constipation  dextrose Oral Gel 15 Gram(s) Oral once PRN Blood Glucose LESS THAN 70 milliGRAM(s)/deciliter  guaiFENesin Oral Liquid (Sugar-Free) 200 milliGRAM(s) Oral every 6 hours PRN Cough        ITEMS UNCHECKED ARE NOT PRESENT     PRESENT SYMPTOMS: [ ]Unable to self-report due to altered mental status  [ ] CPOT [ ] PAINADs [ ] RDOS  Source if other than patient:  [ ]Family   [ ]Team     Pain: [x ]yes [ ]no  QOL impact -   Location -      back              Aggravating factors - movement  Quality -  Radiation - none  Timing- intermittent   Severity (0-10 scale): 6   Minimal acceptable level / Pain goal (0-10 scale): 3    CPOT:    https://[email or web address]/getattachment/[email or web address]/Critical-Care-Pain-Observation-Tool-(CPOT)    Dyspnea:                           [ x]Mild [ ]Moderate [ ]Severe  Anxiety:                             [x ]Mild [ ]Moderate [ ]Severe  Agitation:                          [ ]Mild [ ]Moderate [ ]Severe  Fatigue:                             [ ]Mild [ ]Moderate [ ]Severe  Nausea:                             [ ]Mild [ ]Moderate [ ]Severe  Loss of appetite:              [ ]Mild [ ]Moderate [ ]Severe  Constipation:                   [ ]Mild [ ]Moderate [ ]Severe  Diarrhea:                          [ ]Mild [ ]Moderate [ ]Severe      PCSSQ[Palliative Care Spiritual Screening Question]   Severity (0-10):  Score of 4 or > indicate consideration of Chaplaincy referral.  Chaplaincy Referral: [ ] yes [x ] declined [ ] following [ x] deferred    Caregiver [place]? : [x ] yes [ ] no [ ] Declined   [ ] Deferred            Social work referral [ x] Patient & Family Centered Care Referral [ ]     Anticipatory Grief present?:  [ x] yes [ ] no  [ ] Deferred                  Social work referral [ x] Chaplaincy Referral[ ]    Other Symptoms:  [x ]All other review of systems negative     PHYSICAL EXAM:  Vital Signs Last 24 Hrs  T(C): 36.2 (17 May 2023 12:00), Max: 37.1 (16 May 2023 20:00)  T(F): 97.2 (17 May 2023 12:00), Max: 98.8 (16 May 2023 20:00)  HR: 83 (17 May 2023 12:00) (63 - 96)  BP: --  BP(mean): --  RR: 23 (17 May 2023 12:00) (14 - 36)  SpO2: 100% (17 May 2023 12:00) (89% - 100%)    Parameters below as of 17 May 2023 12:00  Patient On (Oxygen Delivery Method): nasal cannula, high flow  O2 Flow (L/min): 60  O2 Concentration (%): 75     I&O's Summary    16 May 2023 07:01  -  17 May 2023 07:00  --------------------------------------------------------  IN: 212 mL / OUT: 1945 mL / NET: -1733 mL    17 May 2023 07:01  -  17 May 2023 13:33  --------------------------------------------------------  IN: 100 mL / OUT: 345 mL / NET: -245 mL        GENERAL:  [x ]Alert  [x ]Oriented   [ ]Lethargic  [ ]Cachexia  [ ]Unarousable  [x ]Verbal  [ ]Non-Verbal    Behavioral:   [x ] Anxiety  [ ] Delirium [ ] Agitation [ ] Calm  [ ] Other  HEENT:  [x ]Normal  [ ] Temporal Wasting  [ ]Dry mouth   [ ]ET Tube/Trach  [ ]Oral lesions  [ ] Mucositis  PULMONARY:   [ ]Clear [ x]Tachypnea  [ ]Audible excessive secretions   [ x]Rhonchi        [ ]Right [ ]Left [ x]Bilateral  [ ]Crackles        [ ]Right [ ]Left [ ]Bilateral  [ ]Wheezing     [ ]Right [ ]Left [ ]Bilateral  [ ]Diminished breath sounds [ ]right [ ]left [ ]bilateral  CARDIOVASCULAR:    [x ]Regular [ ]Irregular [ ]Tachy  [ ]Greg [ ]Murmur [ ]Other  GASTROINTESTINAL:  [x ]Soft  [ ]Distended   [ ]+BS  [x ]Non tender [ ]Tender  [ ]PEG [ ]OGT/ NGT  Last BM: 5/15  GENITOURINARY:  [ ]Normal [ ] Incontinent   [ ]Oliguria/Anuria   [x ]Aceves  MUSCULOSKELETAL:   [ ]Normal   [x ]Weakness  [x ]Bed/Wheelchair bound [ ]Edema  [  ] amputation  [  ] contraction  NEUROLOGIC:   [ x]No focal deficits  [ ]Cognitive impairment  [ ]Dysphagia [ ]Dysarthria [ ]Paresis [ ]Other   SKIN: See Nursing Skin Assessment for further details  [x ]Normal    [ ]Rash  [ ]Pressure ulcer(s)       Present on admission [ ]y [ ]n   [  ]  Wound    [  ] hyperpigmentation    CRITICAL CARE:  [ ] Shock Present  [ ]Septic [ ]Cardiogenic [ ]Neurologic [ ]Hypovolemic  [ ]  Vasopressors [ ]  Inotropes   [x ]Respiratory failure present [ ]Mechanical ventilation [ ]Non-invasive ventilatory support [ x]High flow    [ ]Acute  [ ]Chronic [ x]Hypoxic  [ ]Hypercarbic [ ]Other  [ ]Other organ failure     LABS:  reviewed                         7.9    14.51 )-----------( 250      ( 17 May 2023 01:41 )             25.0   05-17    132<L>  |  94<L>  |  35<H>  ----------------------------<  184<H>  3.9   |  26  |  1.28    Ca    9.2      17 May 2023 01:41  Phos  3.6     05-17  Mg     2.50     05-17    TPro  6.0  /  Alb  2.6<L>  /  TBili  0.3  /  DBili  x   /  AST  17  /  ALT  13  /  AlkPhos  132<H>  05-17      CAPILLARY BLOOD GLUCOSE      POCT Blood Glucose.: 236 mg/dL (17 May 2023 12:18)  POCT Blood Glucose.: 147 mg/dL (17 May 2023 08:17)  POCT Blood Glucose.: 100 mg/dL (17 May 2023 05:58)  POCT Blood Glucose.: 148 mg/dL (16 May 2023 22:39)  POCT Blood Glucose.: 162 mg/dL (16 May 2023 17:09)  POCT Blood Glucose.: 49 mg/dL (16 May 2023 17:06)      RADIOLOGY & ADDITIONAL STUDIES: reviewed     PROTEIN CALORIE MALNUTRITION PRESENT: [ ]mild [ ]moderate [ ]severe [ ]underweight [ ]morbid obesity  https://www.andeal.org/vault/2440/web/files/ONC/Table_Clinical%20Characteristics%20to%20Document%20Malnutrition-White%20JV%20et%20al%202012.pdf    Height (cm): 162.6 (05-09-23 @ 23:10)  Weight (kg): 92.9 (05-09-23 @ 23:10)  BMI (kg/m2): 35.1 (05-09-23 @ 23:10)    [ ]PPSV2 < or = to 30% [ ]significant weight loss  [ ]poor nutritional intake  [ ]anasarca [ ]Artificial Nutrition      REFERRALS:   [ ]Chaplaincy  [ ]Hospice  [ ]Child Life  [x ]Social Work  [ x]Case management [ ]Holistic Therapy

## 2023-05-17 NOTE — PROGRESS NOTE ADULT - ASSESSMENT
This is a 63 y/o F with PMH of DM2, HTN, HLD, obesity, non-obstructive CAD, ALMA on CPAP, and ILD thought secondary to prior Methotrexate use, RA and Psoriatic arthritis who was transferred from [facility] on 5/9 for acute hypoxemic respiratory failure in the setting of recent MRSA bacteremia and suspected ILD exacerbation    Neuro  Alerted and oriented x4 at baseline, hx of pain RRT on 4/21 at [facility] for left facial droop, found to have left sided carotid stenosis only   -currently alert and oriented   -Palliative care was consulted at OSH for multimodal pain management for diffuse arthralgias/myalgias possible 2/2 RA vs fibromyalgia    -c/w Cymbalta, amitriptyline, lidocaine patch, naproxen, tylenol   -PT following  -encourage OOB to chair if tolerated, having difficulties due to low SPO2 and tachypnea    Pulmonary    Hx of past smoker, ALMA on CPAP, ILD 2/2 ?methotrexate use, pulmonary HTN (remotely on sildenafil stopped ~1 month ago)  who presented to [facility] [place] [facility] on 4/10 for acute hypoxemic respiratory failure was on 40L/70% at [facility] alternating with BIPAP 12/5 70%, episodes of severe hypoxia and tachypnea when lying flat (~55-70%) started on NO on 5/10 with mild improvement.   -noted some hemoptysis this AM, will start hycodan, flonase, and saline nasal spray and monitor closely     - currently on NO 25PPM plan to come down to a goal of 20PPM as tolerated   - Continue HFNC 60L/ titrating fio2 as needed - currently on 70% will taper down as tolerated  - sildenafil increased from 10mg PO q8 hours to 20mg TID, may need to increase if no improvement  - methemoglobin today 0.2, continue to trend as needed while on NO   - cant use nocturnal Bipap d/t NO - no significant hypercapnia on ABG  - Bronchodilators prn   - Continue steroids currently on solumedrol 40mg BID   - Fungitell elevated to 246 - started on PCP treatment with Bactrim - transitioned to PO to decrease fluid load  - follows Dr. Kim at [facility] who has reportedly been caring for patient  - will eventually obtain CTPA as patient has been hospitalized on and off since early April - presently unable to lie flat  -consulted lung transplant team, is not considered a transplant candidate at this time      Cardiac   Hx of HTN and nonobstructive CAD, reported diastolic dysfunction, and pulmonary hypertension (last left/right sided cath at [facility] 12/2022)  - continue statin   - c/w metoprolol tartrate 25mg TID  - holding nifedipine given normotension   - official ECHO 5/10 with Normal LV systolic function, RV enlargement with decreased RV systolic function and severe tricuspid regurgitation. Estimated pulmonary artery systolic pressure equals 96 mm,   evidence of severe pulmonary hypertension.  - pro-BNP 69740 on 5/10  - c/w diuresis with Bumex and continue to monitor electrolytes    GI  Melena overnight   -tolerating diet - regular consistent carb  -PPI BID for now   -active BM    /Renal  Creat on admission 0.5  -now uptrending to 1.36, SHAAN likely medication induced (bactrim)   -continue diuresis with Bumex 1mg BID to keep net negative 1L  -replete electrolytes and trend Q8h while diuresing   -muñiz in place with good UO       Endocrine   DM2 A1c 7.1, hyperglycemia on steroids, reported to have hypercalcemia 2/2 left parathyroid adenoma s/p Alendronate x1   - continue sliding scale and increase premeal 5 units to 7 units, increase as needed - continue Lantus to 14 units  -currently on solumedrol 40mg BID as part of PCP treatment   - prednisone 40mg daily changed to solumedrol 40mg Q8h on 5/10 for ILD treatment but has shown minimal improvement   - s/p Alendronate x1 with resolved hypercalcemia will continue to monitor    - Thyroid nodule noted on imaging - will need outpatient follow-up    Rheumatology   Hx of RA/psoriatic arthritis diagnosed ~2016, now seronegative, follows with Dr. Rush outpatient, failed multiple modalities (methotrexate, humira, xeljanz and rituximab due to insurance issues) recently on leflunomide.   -OSH records showed patient was negative  Kassi-1, SS-B, SS-A, RNP, SIRISHA, and RF   -repeated RF, SIRISHA, Double stranded DNA, neutrophil cytoplasmic antibody, myomarker panel   -Now with negative RF - per Dr. Rush was previously seropositive    Infectious Disease   MRSA bacteremia at [facility]. Cultures grew MRSA and was initially treated with vancomycin (4/21-4/25 and 5/3-5/8) but continued to have positive blood cultures x5 and subsequently started on Daptomycin on 4/25 with dose increase on 5/1. As per OSH records, the plan was to continue the Daptomycin for a 6 week course and Rifampin (started on 5/1) for a 14 day course.  - continue current antibiotics: 6 week course of Daptomycin for MRSA bacteremia (5/1-   ) and Bactrim PCP (5/11- )  for 21 days given increased LDH and Fungitell  - s/p empiric Meropenem (5/8-5/14 )  - CK 22   - f/u repeat blood cultures here   - RVP negative  - LDH elevated to 743   - Pending sputum PCP PRC if able.  - ID following    Heme/DVT PPX  Anemia   - Required PRBC transfusion 5/11 now drop in Hgb overnigt requiring 1u PRBC   -holding full dose lovenox SQ for now and will start heparin 5000unit SQ for DVT ppx   -FOBT positive but stool is brown. Will monitor  -Ecchymoses noted over RUE - has good pulses. Will outline and monitor for change.  -No signs of hemolysis  -will f/u iron studies       GOC:   -Full Code - MOLST from [facility] confirmed with patient  -HCP filled out with daughter Joyce Alicea as primary and secondary is her sister Emi Sánchez   -D/w Patient and  at bedside daily    This is a 61 y/o F with PMH of DM2, HTN, HLD, obesity, non-obstructive CAD, ALMA on CPAP, and ILD thought secondary to prior Methotrexate use, RA and Psoriatic arthritis who was transferred from [facility] on [place]9 for acute hypoxemic respiratory failure in the setting of recent MRSA bacteremia and suspected ILD exacerbation    Neuro  Alerted and oriented x4 at baseline, hx of pain RRT on 4/21 at [facility] for left facial droop, found to have left sided carotid stenosis only   -currently alert and oriented   -Palliative care was consulted at OSH for multimodal pain management for diffuse arthralgias/myalgias possible 2/2 RA vs fibromyalgia    -c/w Cymbalta, amitriptyline, lidocaine patch, and tylenol prn  -PT following  -encourage OOB to chair if tolerated, still having difficulties due to low SPO2 and tachypnea    Pulmonary    Hx of past smoker, ALMA on CPAP, ILD 2/2 ?methotrexate use, pulmonary HTN (remotely on sildenafil stopped ~1 month ago)  who presented to [facility] on 4/10 for acute hypoxemic respiratory failure was on 40L/70% at [facility] alternating with BIPAP 12/5 70%, episodes of severe hypoxia and tachypnea when lying flat (~55-70%) started on NO on 5/10 with mild improvement.   -noted some hemoptysis this AM, will start hycodan, flonase, and saline nasal spray and monitor closely     - currently on NO 20PPM plan to come down slowly as tolerated   - Continue HFNC 60L/ titrating fio2 as needed - currently on 60-70% will taper down as tolerated  - currently on sildenafil 20mg TID  - methemoglobin today 0.2, continue to trend as needed while on NO   - cant use nocturnal Bipap d/t NO - no significant hypercapnia on ABG  - Bronchodilators prn   - Continue steroids currently on solumedrol 40mg BID will change to 40mg daily x5 days as part of PCP treatment   - Fungitell elevated to 246 - started on PCP treatment with Bactrim - transitioned to PO to decrease fluid load  - follows Dr. Kim at [facility] who has reportedly been caring for patient  - will eventually obtain CTPA as patient has been hospitalized on and off since early April - presently unable to lie flat  -consulted lung transplant team, is not considered a transplant candidate at this time      Cardiac   Hx of HTN and nonobstructive CAD, reported diastolic dysfunction, and pulmonary hypertension (last left/right sided cath at [facility] 12/2022)  - continue statin   - c/w metoprolol tartrate 25mg TID  - holding nifedipine given normotension   - official ECHO 5/10 with Normal LV systolic function, RV enlargement with decreased RV systolic function and severe tricuspid regurgitation. Estimated pulmonary artery systolic pressure equals 96 mm,   evidence of severe pulmonary hypertension.  - pro-BNP 96881 on 5/10  - c/w diuresis with Bumex and continue to monitor electrolytes    GI  -tolerating diet - regular consistent carb  -PPI BID for now   -active BM  -episode of melena on 5/15    /Renal  Creat on admission 0.5  -was uptrending, now stable, SHAAN likely medication induced (bactrim) vs diuresis   -bumex changed to 1mg daily   -replete electrolytes and trend Q8h while diuresing   -muñiz in place with good UO       Endocrine   DM2 A1c 7.1, hyperglycemia on steroids, reported to have hypercalcemia 2/2 left parathyroid adenoma s/p Alendronate x1   - continue sliding scale and increase premeal 5 units to 7 units, increase as needed - continue Lantus to 14 units  -currently on solumedrol as part of PCP treatment   - prednisone 40mg daily changed to solumedrol 40mg Q8h on 5/10 for ILD treatment but has shown minimal improvement will change to PCP dosing, now on 40mg daily x 5days   - s/p Alendronate x1 with resolved hypercalcemia will continue to monitor    - Thyroid nodule noted on imaging - will need outpatient follow-up    Rheumatology   Hx of RA/psoriatic arthritis diagnosed ~2016, now seronegative, follows with Dr. Rush outpatient, failed multiple modalities (methotrexate, humira, xeljanz and rituximab due to insurance issues) recently on leflunomide.   -OSH records showed patient was negative  Kassi-1, SS-B, SS-A, RNP, SIRISHA, and RF   -repeated RF, SIRISHA, Double stranded DNA, neutrophil cytoplasmic antibody, myomarker panel   -Now with negative RF - per Dr. Rush was previously seropositive    Infectious Disease   MRSA bacteremia at [facility]. Cultures grew MRSA and was initially treated with vancomycin (4/21-4/25 and 5/3-5/8) but continued to have positive blood cultures x5 and subsequently started on Daptomycin on 4/25 with dose increase on 5/1. As per OSH records, the plan was to continue the Daptomycin for a 6 week course and Rifampin (started on 5/1) for a 14 day course.  - continue current antibiotics: 6 week course of Daptomycin for MRSA bacteremia (5/1-   ) and Bactrim PCP (5/11- )  for 21 days given increased LDH and Fungitell  - s/p empiric Meropenem (5/8-5/14 )  -last CK 22   - f/u repeat blood cultures all NGTD    - RVP negative  - LDH elevated to 743   - ID following    Heme/DVT PPX  Anemia and now with left axillary DVT  - Required PRBC transfusion 5/11 now drop in Hgb 5/15 requiring 1u PRBC   -holding full dose lovenox SQ and was started on heparin 5000unit SQ for DVT ppx  -VA duplex on 5/16 showed left axillary DVT, if Hgb stable will start heparin gtt with PTT goal 50-70   -FOBT positive with dark brown stool on 5/15 Will monitor  -Ecchymoses noted over RUE - has good pulses. Will outline and monitor for change.      GOC:   -Full Code - MOLST from [facility] confirmed with patient  -HCP filled out with daughter Joyce Alicae as primary and secondary is her sister Emi Sánchez   -GOC discussion with patient and  at bedside today   -consulted palliative to assist continuing ongoing GOC discussions

## 2023-05-17 NOTE — CONSULT NOTE ADULT - PROBLEM SELECTOR RECOMMENDATION 4
- Transplant team consulted for evaluation of lung transplant candidacy  - management as per primary team

## 2023-05-18 NOTE — PROGRESS NOTE ADULT - ASSESSMENT
63 y/o F with PMH of DM2, HTN, HLD, obesity, non-obstructive CAD, ALMA on CPAP, and ILD thought secondary to prior Methotrexate use, RA and Psoriatic arthritis who was transferred from [facility] on 5/9 for acute hypoxemic respiratory failure in the setting of recent MRSA bacteremia and suspected ILD exacerbation    Neuro  Alerted and oriented x4 at baseline, hx of pain RRT on 4/21 at [facility] for left facial droop, found to have left sided carotid stenosis only   -currently alert and oriented   -Palliative care was consulted at OSH for multimodal pain management for diffuse arthralgias/myalgias possible 2/2 RA vs fibromyalgia    -c/w Cymbalta, amitriptyline, lidocaine patch, and tylenol prn  -PT following  -encourage OOB to chair if tolerated, still having difficulties due to low SPO2 and tachypnea    Pulmonary    Hx of past smoker, ALMA on CPAP, ILD 2/2 ?methotrexate use, pulmonary HTN (remotely on sildenafil stopped ~1 month ago)  who presented to [facility] on 4/10 for acute hypoxemic respiratory failure was on 40L/70% at [facility] alternating with BIPAP 12/5 70%, episodes of severe hypoxia and tachypnea when lying flat (~55-70%) started on NO on 5/10 with mild improvement.   -noted some hemoptysis this AM, will start hycodan, flonase, and saline nasal spray and monitor closely     - currently on NO 20PPM plan to come down slowly as tolerated   - Continue HFNC 60L/ titrating fio2 as needed - currently on 60-70% will taper down as tolerated  - currently on sildenafil 20mg TID  - methemoglobin today 0.2, continue to trend as needed while on NO   - cant use nocturnal Bipap d/t NO - no significant hypercapnia on ABG  - Bronchodilators prn   - Continue steroids currently on solumedrol 40mg daily x5 days as part of PCP treatment (day 2)  - Fungitell elevated to 246 - started on PCP treatment with Bactrim - transitioned to PO to decrease fluid load  - follows Dr. Kim at [facility]   - will eventually obtain CTPA as patient has been hospitalized on and off since early April - presently unable to lie flat  -consulted lung transplant team, is not considered a transplant candidate at this time      Cardiac   Hx of HTN and nonobstructive CAD, reported diastolic dysfunction, and pulmonary hypertension (last left/right sided cath at [facility] 12/2022)  - continue statin   - c/w metoprolol tartrate 25mg TID  - holding nifedipine given normotension   - official ECHO 5/10 with Normal LV systolic function, RV enlargement with decreased RV systolic function and severe tricuspid regurgitation. Estimated pulmonary artery systolic pressure equals 96 mm,   evidence of severe pulmonary hypertension.  - pro-BNP 06138 on 5/10  - c/w diuresis with Bumex - now downtrended to 1mg QD and continue to monitor electrolytes    GI  -tolerating diet - regular consistent carb  -PPI BID for now   -active BM  -episode of melena on 5/15    /Renal  Creat on admission 0.5  -was uptrending, now stable, SHAAN likely medication induced (bactrim) vs diuresis   -bumex changed to 1mg daily   -replete electrolytes and trend Q8h while diuresing   -muñiz in place with good UO       Endocrine   DM2 A1c 7.1, hyperglycemia on steroids, reported to have hypercalcemia 2/2 left parathyroid adenoma s/p Alendronate x1   - continue sliding scale and increase premeal 5 units to 7 units, increase as needed - continue Lantus to 14 units  -currently on solumedrol as part of PCP treatment   - prednisone 40mg daily changed to solumedrol 40mg Q8h on 5/10 for ILD treatment but has shown minimal improvement will change to PCP dosing, now on 40mg daily x 5days   - s/p Alendronate x1 with resolved hypercalcemia will continue to monitor    - Thyroid nodule noted on imaging - will need outpatient follow-up    Rheumatology   Hx of RA/psoriatic arthritis diagnosed ~2016, now seronegative, follows with Dr. Rush outpatient, failed multiple modalities (methotrexate, humira, xeljanz and rituximab due to insurance issues) recently on leflunomide.   -OSH records showed patient was negative  Kassi-1, SS-B, SS-A, RNP, SIRISHA, and RF   -repeated RF, SIRISHA, Double stranded DNA, neutrophil cytoplasmic antibody, myomarker panel   -Now with negative RF - per Dr. Rush was previously seropositive    Infectious Disease   MRSA bacteremia at [facility]. Cultures grew MRSA and was initially treated with vancomycin (4/21-4/25 and 5/3-5/8) but continued to have positive blood cultures x5 and subsequently started on Daptomycin on 4/25 with dose increase on 5/1. As per OSH records, the plan was to continue the Daptomycin for a 6 week course and Rifampin (started on 5/1) for a 14 day course.  - continue current antibiotics: 6 week course of Daptomycin for MRSA bacteremia (5/1-  (plan to 5-30) ) and Bactrim PCP (5/11- )  for 21 days given increased LDH and Fungitell  - s/p empiric Meropenem (5/8-5/14 )  -last CK 22, will check with next blood draw    - f/u repeat blood cultures all NGTD    - RVP negative  - LDH elevated to 743   - ID following    Heme/DVT PPX  Anemia and now with left axillary DVT  - Required PRBC transfusion 5/11 now drop in Hgb 5/15 requiring 1u PRBC   -VA duplex on 5/16 showed left axillary DVT, Hgb stable so will start heparin gtt today, low range 50-70 goal   -FOBT positive with dark brown stool on 5/15 Will monitor  -Ecchymoses noted over RUE - has good pulses. Will outline and monitor for change.      GOC:   -Full Code - MOLST from [facility] confirmed with patient  -HCP filled out with daughter Joyce Alicea as primary and secondary is her sister Emi Sánchez   -GOC discussion with patient and  at bedside today   -consulted palliative to assist continuing ongoing GOC discussions    61 y/o F with PMH of DM2, HTN, HLD, obesity, non-obstructive CAD, ALMA on CPAP, and ILD thought secondary to prior Methotrexate use, RA and Psoriatic arthritis who was [place] [facility] [place] for acute hypoxemic respiratory failure in the setting of recent MRSA bacteremia and suspected ILD exacerbation    Neuro  Alerted and oriented x4 at baseline, hx of pain RRT on 4/21 at [facility] for left facial droop, found to have left sided carotid stenosis only   -currently alert and oriented   -Palliative care was consulted at OSH for multimodal pain management for diffuse arthralgias/myalgias possible 2/2 RA vs fibromyalgia    -c/w Cymbalta, amitriptyline, lidocaine patch, and tylenol prn  -PT following  -encourage OOB to chair if tolerated, still having difficulties due to low SPO2 and tachypnea    Pulmonary    Hx of past smoker, ALMA on CPAP, ILD 2/2 ?methotrexate use, pulmonary HTN (remotely on sildenafil stopped ~1 month ago)  who [place] [facility] [place]10 for acute hypoxemic respiratory failure was on 40L/70% at [facility] alternating with BIPAP 12/5 70%, episodes of severe hypoxia and tachypnea when lying flat (~55-70%) started on NO on 5/10 with mild improvement.   -noted some hemoptysis this AM, will start hycodan, flonase, and saline nasal spray and monitor closely     - currently on NO 30PPM plan to come down slowly as tolerated - goal for 20 by end of day   - Continue HFNC 60L/ titrating fio2 as needed - currently on 60-70% will taper down as tolerated  - currently on sildenafil 20mg TID  - methemoglobin 0.2, continue to trend   - cant use nocturnal Bipap d/t NO - no significant hypercapnia on ABG  - Bronchodilators prn   - Continue steroids currently on solumedrol 40mg daily x5 days as part of PCP treatment (day 2)  - Fungitell elevated to 246 - started on PCP treatment with Bactrim - transitioned to PO to decrease fluid load  - follows Dr. Kim at [facility]   - will eventually obtain CTPA as patient has been hospitalized on and off since early April - presently unable to lie flat  -consulted lung transplant team, is not considered a transplant candidate at this time    - adding on low dose morphine on 5/18 for dyspnea     Cardiac   Hx of HTN and nonobstructive CAD, reported diastolic dysfunction, and pulmonary hypertension (last left/right sided cath at [facility] 12/2022)  - continue statin   - c/w metoprolol tartrate 25mg TID  - holding nifedipine given normotension   - official ECHO 5/10 with Normal LV systolic function, RV enlargement with decreased RV systolic function and severe tricuspid regurgitation. Estimated pulmonary artery systolic pressure equals 96 mm,   evidence of severe pulmonary hypertension.  - pro-BNP 98233 on 5/10  - c/w diuresis with Bumex - now downtrended to 1mg QD PO and continue to monitor electrolytes    GI  -tolerating diet - regular consistent carb  -PPI BID for now   -active BM  -episode of melena on 5/15    /Renal  Creat on admission 0.5  -was uptrending, now stable, SHAAN likely medication induced (bactrim) vs diuresis   -bumex changed to 1mg daily   -replete electrolytes and trend Q12h while diuresing   - TOV today, BS q8hrs     Endocrine   DM2 A1c 7.1, hyperglycemia on steroids, reported to have hypercalcemia 2/2 left parathyroid adenoma s/p Alendronate x1   - continue sliding scale and increase premeal 5 units to 7 units, increase as needed - continue Lantus to 14 units  -currently on solumedrol as part of PCP treatment   - prednisone 40mg daily changed to solumedrol 40mg Q8h on 5/10 for ILD treatment but has shown minimal improvement will change to PCP dosing, now on 40mg daily x 5days   - s/p Alendronate x1 with resolved hypercalcemia will continue to monitor    - Thyroid nodule noted on imaging - will need outpatient follow-up    Rheumatology   Hx of RA/psoriatic arthritis diagnosed ~2016, now seronegative, follows with Dr. Rush outpatient, failed multiple modalities (methotrexate, humira, xeljanz and rituximab due to insurance issues) recently on leflunomide.   -OSH records showed patient was negative  Kassi-1, SS-B, SS-A, RNP, SIRISHA, and RF   -repeated RF, SIRISHA, Double stranded DNA, neutrophil cytoplasmic antibody, myomarker panel   -Now with negative RF - per Dr. Rush was previously seropositive    Infectious Disease   MRSA bacteremia at [facility]. Cultures grew MRSA and was initially treated with vancomycin (4/21-4/25 and 5/3-5/8) but continued to have positive blood cultures x5 and subsequently started on Daptomycin on 4/25 with dose increase on 5/1. As per OSH records, the plan was to continue the Daptomycin for a 6 week course and Rifampin (started on 5/1) for a 14 day course.  - continue current antibiotics: 6 week course of Daptomycin for MRSA bacteremia (5/1-  (plan to 5-30) ) and Bactrim PCP (5/11- )  for 21 days given increased LDH and Fungitell  - s/p empiric Meropenem (5/8-5/14 )  -last CK 22, will check with next blood draw    - f/u repeat blood cultures all NGTD    - RVP negative  - LDH elevated to 743   - ID following    Heme/DVT PPX  Anemia and now with left axillary DVT  - Required PRBC transfusion 5/11 now drop in Hgb 5/15 requiring 1u PRBC   -VA duplex on 5/16 showed left axillary DVT, Hgb stable so will start heparin gtt today, low range 50-70 goal   -FOBT positive with dark brown stool on 5/15 Will monitor  -Ecchymoses noted over RUE - has good pulses. Will outline and monitor for change.      GOC:   -Full Code - MOLST from [facility] confirmed with patient  -HCP filled out with daughter Joyce Alicea as primary and secondary is her sister Emi Sánchez   -GOC discussion with patient and  at bedside   -consulted palliative to assist continuing ongoing GOC discussions

## 2023-05-18 NOTE — PROGRESS NOTE ADULT - PROBLEM SELECTOR PLAN 3
- possibly due to ILD flare, PJP PNA and fluid overload 2/2 acute on chronic decompensated RV failure  - CXRAY -  diffuse opacities consistent with interstitial lung disease.  - TTE-  Septal motion consistent with right ventricular overload. Flattening of the interventricular septum in both systole and diastole is consistent with right ventricular pressure overload.  Right ventricular enlargement with decreased right ventricular systolic function. Severe tricuspid regurgitation. Estimated pulmonary artery systolic pressure equals 96 mm Hg, assuming right atrial pressure equals 3  mm Hg, there is echocardiographic evidence of severe pulmonary hypertension.  - management as per primary team.

## 2023-05-18 NOTE — PROGRESS NOTE ADULT - ATTENDING COMMENTS
Pt seen and examined. 62F with extensive history including ILD thought secondary to prior Methotrexate use, severe pulm HTN RA and Psoriatic arthritis, now with acute on chronic hypoxic resp failure 2/2 a combination of possible ILD flare, probable PJP PNA and fluid overload 2/2 acute on chronic decompensated RV failure, MRSA bacteremia and SHAAN 2/2 pre-renal ATN. Remains on HFNC, FiO2 now up to 100% and Angie at 30 ppm. Cont to titrate down FiO2 and Angie as tolerated to keep O2 sat > 88 %. Cont Sildenafil TID. Remains on Bactrim for PJP pneumonia, cont solumedrol 40 mg IV daily. Cr trending down, clinically appears euvolemic, transition to Bumex to 1 mg PO daily.  Cont Daptomycin IV for MRSA bacteremia, BCxs from 5/9 NGTD. Unable to get ELICEO to r/o endocarditis d/t resp instability. Overall prognosis poor. Not a transplant candidate. Patient and  updated at bedside. Ongoing GOC discussion with the patient and her family with plan for a family meeting tomorrow. Pt seen and examined. 62F with extensive history including ILD thought secondary to prior Methotrexate use, severe pulm HTN RA and Psoriatic arthritis, now with acute on chronic hypoxic resp failure 2/2 a combination of possible ILD flare, probable PJP PNA and fluid overload 2/2 acute on chronic decompensated RV failure, MRSA bacteremia and SHAAN 2/2 pre-renal ATN. Remains on HFNC, FiO2 now up to 100% and Angie at 30 ppm. Cont to titrate down FiO2 and Angie as tolerated to keep O2 sat > 88 %. Cont Sildenafil TID. Remains on Bactrim for PJP pneumonia, cont solumedrol 40 mg IV daily. Cr trending down, clinically appears euvolemic, transition to Bumex to 1 mg PO daily.  Cont Daptomycin IV for MRSA bacteremia, BCxs from 5/9 NGTD. Unable to get ELICEO to r/o endocarditis d/t resp instability. L axillary DVT, AC was on hold d/t dark stools with anemia requiring a PRBC transfusion. Hb now stable, remains on Protonix BID. Re-challenge with a heparin gtt with close monitoring of CBC. Overall prognosis poor. Not a transplant candidate. Patient and  updated at bedside. Ongoing GOC discussion with the patient and her family with plan for a family meeting tomorrow.

## 2023-05-18 NOTE — PROGRESS NOTE ADULT - PROBLEM SELECTOR PLAN 5
Patient AAOx2 with delirium. She stated she was aware she had lung problem. When asked about her conversation with the transplant team yesterday, she stated she was still waiting for them to speak to her.   Patient restless in bed.     Attempted to reach out to daughter Joyce. Voicemail left for daughter. Awaiting callback.    Agree with IV Morphine 0.5mg PRN for pain/dyspnea.    Case reviewed with primary team. Plan for possible family meeting tomorrow.   Thank you for allowing us to participate in your patient's care. Please page [phone] for any questions/concerns.

## 2023-05-18 NOTE — PROGRESS NOTE ADULT - SUBJECTIVE AND OBJECTIVE BOX
Date of Service  : 5/18/2023    SUBJECTIVE AND OBJECTIVE:  Patient seen and examined at bedside. Patient restless. Patient Awake and alert x2 with some delirium.     INTERVAL HPI/OVERNIGHT EVENTS:  Per primary team,  HFNC still at 60/80 though had transient episodes of desaturations.       Allergies    penicillins (Other)    Intolerances    MEDICATIONS  (STANDING):  amitriptyline 10 milliGRAM(s) Oral at bedtime  atorvastatin 20 milliGRAM(s) Oral at bedtime  buMETAnide 1 milliGRAM(s) Oral daily  chlorhexidine 2% Cloths 1 Application(s) Topical <User Schedule>  cholecalciferol 1000 Unit(s) Oral daily  DAPTOmycin IVPB 650 milliGRAM(s) IV Intermittent every 24 hours  dextrose 5%. 1000 milliLiter(s) (100 mL/Hr) IV Continuous <Continuous>  dextrose 5%. 1000 milliLiter(s) (50 mL/Hr) IV Continuous <Continuous>  dextrose 50% Injectable 25 Gram(s) IV Push once  dextrose 50% Injectable 25 Gram(s) IV Push once  dextrose 50% Injectable 12.5 Gram(s) IV Push once  DULoxetine 30 milliGRAM(s) Oral daily  fluticasone propionate 50 MICROgram(s)/spray Nasal Spray 1 Spray(s) Both Nostrils two times a day  glucagon  Injectable 1 milliGRAM(s) IntraMuscular once  heparin  Infusion.  Unit(s)/Hr (17 mL/Hr) IV Continuous <Continuous>  hydrocodone/homatropine Syrup 5 milliLiter(s) Oral every 8 hours  insulin glargine Injectable (LANTUS) 14 Unit(s) SubCutaneous at bedtime  insulin lispro (ADMELOG) corrective regimen sliding scale   SubCutaneous at bedtime  insulin lispro (ADMELOG) corrective regimen sliding scale   SubCutaneous three times a day before meals  insulin lispro Injectable (ADMELOG) 7 Unit(s) SubCutaneous three times a day before meals  lactobacillus acidophilus 1 Tablet(s) Oral three times a day with meals  lidocaine   4% Patch 1 Patch Transdermal daily  methylPREDNISolone sodium succinate Injectable 40 milliGRAM(s) IV Push daily  metoprolol tartrate 25 milliGRAM(s) Oral three times a day  pantoprazole  Injectable 40 milliGRAM(s) IV Push every 12 hours  sildenafil (REVATIO) 20 milliGRAM(s) Oral every 8 hours  sodium chloride 0.65% Nasal 1 Spray(s) Both Nostrils two times a day  trimethoprim  40 mG/sulfamethoxazole 200 mG Suspension 400 milliGRAM(s) Oral every 8 hours    MEDICATIONS  (PRN):  albuterol/ipratropium for Nebulization 3 milliLiter(s) Nebulizer every 6 hours PRN Shortness of Breath and/or Wheezing  bisacodyl Suppository 10 milliGRAM(s) Rectal daily PRN Constipation  dextrose Oral Gel 15 Gram(s) Oral once PRN Blood Glucose LESS THAN 70 milliGRAM(s)/deciliter  guaiFENesin Oral Liquid (Sugar-Free) 200 milliGRAM(s) Oral every 6 hours PRN Cough  morphine  - Injectable 0.5 milliGRAM(s) IV Push every 6 hours PRN Moderate Pain (4 - 6)      ITEMS UNCHECKED ARE NOT PRESENT    PRESENT SYMPTOMS: [ ]Unable to self-report due to altered mental status- see [ ] CPOT [ ] PAINADS [ ] RDOS  Source if other than patient:  [ ]Family   [ ]Team     Pain: [x ]yes [ ]no  QOL impact -   Location -      back              Aggravating factors - movement  Quality -  Radiation - none  Timing- intermittent   Severity (0-10 scale): 6   Minimal acceptable level / Pain goal (0-10 scale): 3      Dyspnea:                           [x ]Mild [ ]Moderate [ ]Severe  Anxiety:                             [x ]Mild [ ]Moderate [ ]Severe  Agitation:                          [ ]Mild [ ]Moderate [ ]Severe  Fatigue:                             [ ]Mild [ ]Moderate [ ]Severe  Nausea:                             [ ]Mild [ ]Moderate [ ]Severe  Loss of appetite:              [ ]Mild [ ]Moderate [ ]Severe  Constipation:                   [ ]Mild [ ]Moderate [ ]Severe  Diarrhea:                          [ ]Mild [ ]Moderate [ ]Severe    CPOT:    https://www.[email or web address]/[email or web address]/Critical-Care-Pain-Observation-Tool-(CPOT)    PCSSQ[Palliative Care Spiritual Screening Question]   Severity (0-10):  Score of 4 or > indicate consideration of Chaplaincy referral.  Chaplaincy Referral: [ ] yes [ ] refused [ ] following [x ] deferred    Caregiver [place]? : [ ] yes [ ] no [ ] Declined [x ] Deferred              Social work referral [ ] Patient & Family Centered Care Referral [ ]     Anticipatory Grief present?:  [ ] yes [ ] no  [ x] Deferred                  Social work referral [ ] Chaplaincy Referral[ ]    Other Symptoms:  [x ]All other review of systems negative     PHYSICAL EXAM:  Vital Signs Last 24 Hrs  T(C): 36.4 (18 May 2023 12:00), Max: 37.1 (17 May 2023 20:00)  T(F): 97.6 (18 May 2023 12:00), Max: 98.8 (17 May 2023 20:00)  HR: 84 (18 May 2023 14:00) (72 - 102)  BP: --  BP(mean): --  RR: 24 (18 May 2023 14:00) (17 - 33)  SpO2: 100% (18 May 2023 14:00) (82% - 100%)    Parameters below as of 18 May 2023 14:00  Patient On (Oxygen Delivery Method): nasal cannula, high flow  O2 Flow (L/min): 60  O2 Concentration (%): 100     I&O's Summary    17 May 2023 07:01  -  18 May 2023 07:00  --------------------------------------------------------  IN: 212.2 mL / OUT: 1345 mL / NET: -1132.8 mL    18 May 2023 07:01  -  18 May 2023 14:21  --------------------------------------------------------  IN: 63 mL / OUT: 265 mL / NET: -202 mL       GENERAL:  [x ]Alert  [x ]Orientedx2   [ ]Lethargic  [ ]Cachexia  [ ]Unarousable  [x ]Verbal  [ ]Non-Verbal    Behavioral:   [x ] Anxiety  [ ] Delirium [ ] Agitation [ ] Calm  [ ] Other  HEENT:  [ ]Normal  [ ] Temporal Wasting  [ ]Dry mouth   [ ]ET Tube/Trach  [ ]Oral lesions  [ ] Mucositis  PULMONARY:   [ ]Clear [ x]Tachypnea  [ ]Audible excessive secretions   [ x]Rhonchi        [ ]Right [ ]Left [ x]Bilateral  [ ]Crackles        [ ]Right [ ]Left [ ]Bilateral  [ ]Wheezing     [ ]Right [ ]Left [ ]Bilateral  [ ]Diminished breath sounds [ ]right [ ]left [ ]bilateral  CARDIOVASCULAR:    [x ]Regular [ ]Irregular [ ]Tachy  [ ]Greg [ ]Murmur [ ]Other  GASTROINTESTINAL:  [x ]Soft  [ ]Distended   [ ]+BS  [x ]Non tender [ ]Tender  [ ]PEG [ ]OGT/ NGT  Last BM: 5/15  GENITOURINARY:  [ ]Normal [ ] Incontinent   [ ]Oliguria/Anuria   [x ]Aceves  MUSCULOSKELETAL:   [ ]Normal   [x ]Weakness  [x ]Bed/Wheelchair bound [ ]Edema  [  ] amputation  [  ] contraction  NEUROLOGIC:   [ x]No focal deficits  [ ]Cognitive impairment  [ ]Dysphagia [ ]Dysarthria [ ]Paresis [ ]Other   SKIN: See Nursing Skin Assessment for further details  [x ]Normal    [ ]Rash  [ ]Pressure ulcer(s)       Present on admission [ ]y [ ]n   [  ]  Wound    [  ] hyperpigmentation      CRITICAL CARE:  [ ]Shock Present  [ ]Septic [ ]Cardiogenic [ ]Neurologic [ ]Hypovolemic  [ ]Vasopressors [ ]Inotropes  [ x]Respiratory failure present [ ]Mechanical Ventilation [ ]Non-invasive ventilatory support [ x]High-Flow   [ ]Acute  [ ]Chronic [x ]Hypoxic  [ ]Hypercarbic [ ]Other  [ ]Other organ failure     LABS:  reviewed                         7.7    18.47 )-----------( 256      ( 18 May 2023 04:10 )             24.5   05-18    135  |  99  |  29<H>  ----------------------------<  177<H>  4.4   |  27  |  0.90    Ca    9.6      18 May 2023 04:10  Phos  3.4     05-18  Mg     1.90     05-18    TPro  6.0  /  Alb  2.6<L>  /  TBili  0.4  /  DBili  x   /  AST  21  /  ALT  12  /  AlkPhos  129<H>  05-18  PTT - ( 17 May 2023 15:45 )  PTT:30.8 sec    CAPILLARY BLOOD GLUCOSE      POCT Blood Glucose.: 222 mg/dL (18 May 2023 12:04)  POCT Blood Glucose.: 279 mg/dL (18 May 2023 08:23)  POCT Blood Glucose.: 122 mg/dL (17 May 2023 23:06)  POCT Blood Glucose.: 89 mg/dL (17 May 2023 22:47)  POCT Blood Glucose.: 381 mg/dL (17 May 2023 17:23)      RADIOLOGY & ADDITIONAL STUDIES: reviewed     Protein Calorie Malnutrition Present: [ ]mild [ ]moderate [ ]severe [ ]underweight [ ]morbid obesity  https://www.andeal.org/vault/2440/web/files/ONC/Table_Clinical%20Characteristics%20to%20Document%20Malnutrition-White%20JV%20et%20al%202012.pdf    Height (cm): 162.6 (05-09-23 @ 23:10)  Weight (kg): 92.9 (05-09-23 @ 23:10)  BMI (kg/m2): 35.1 (05-09-23 @ 23:10)    [ ]PPSV2 < or = 30%  [ ]significant weight loss [ ]poor nutritional intake [ ]anasarca   [ ]Artificial Nutrition    REFERRALS:   [ ]Chaplaincy  [ ]Hospice  [ ]Child Life  [ ]Social Work  [x ]Case management [ ]Holistic Therapy    Date of Service  : 5/18/2023    SUBJECTIVE AND OBJECTIVE:  Patient seen and examined at bedside. Patient restless. Patient Awake and alert x2 with some delirium.     INTERVAL HPI/OVERNIGHT EVENTS:  Per primary team,  HFNC still at 60/80 though had transient episodes of desaturations.       Allergies    penicillins (Other)    Intolerances    MEDICATIONS  (STANDING):  amitriptyline 10 milliGRAM(s) Oral at bedtime  atorvastatin 20 milliGRAM(s) Oral at bedtime  buMETAnide 1 milliGRAM(s) Oral daily  chlorhexidine 2% Cloths 1 Application(s) Topical <User Schedule>  cholecalciferol 1000 Unit(s) Oral daily  DAPTOmycin IVPB 650 milliGRAM(s) IV Intermittent every 24 hours  dextrose 5%. 1000 milliLiter(s) (100 mL/Hr) IV Continuous <Continuous>  dextrose 5%. 1000 milliLiter(s) (50 mL/Hr) IV Continuous <Continuous>  dextrose 50% Injectable 25 Gram(s) IV Push once  dextrose 50% Injectable 25 Gram(s) IV Push once  dextrose 50% Injectable 12.5 Gram(s) IV Push once  DULoxetine 30 milliGRAM(s) Oral daily  fluticasone propionate 50 MICROgram(s)/spray Nasal Spray 1 Spray(s) Both Nostrils two times a day  glucagon  Injectable 1 milliGRAM(s) IntraMuscular once  heparin  Infusion.  Unit(s)/Hr (17 mL/Hr) IV Continuous <Continuous>  hydrocodone/homatropine Syrup 5 milliLiter(s) Oral every 8 hours  insulin glargine Injectable (LANTUS) 14 Unit(s) SubCutaneous at bedtime  insulin lispro (ADMELOG) corrective regimen sliding scale   SubCutaneous at bedtime  insulin lispro (ADMELOG) corrective regimen sliding scale   SubCutaneous three times a day before meals  insulin lispro Injectable (ADMELOG) 7 Unit(s) SubCutaneous three times a day before meals  lactobacillus acidophilus 1 Tablet(s) Oral three times a day with meals  lidocaine   4% Patch 1 Patch Transdermal daily  methylPREDNISolone sodium succinate Injectable 40 milliGRAM(s) IV Push daily  metoprolol tartrate 25 milliGRAM(s) Oral three times a day  pantoprazole  Injectable 40 milliGRAM(s) IV Push every 12 hours  sildenafil (REVATIO) 20 milliGRAM(s) Oral every 8 hours  sodium chloride 0.65% Nasal 1 Spray(s) Both Nostrils two times a day  trimethoprim  40 mG/sulfamethoxazole 200 mG Suspension 400 milliGRAM(s) Oral every 8 hours    MEDICATIONS  (PRN):  albuterol/ipratropium for Nebulization 3 milliLiter(s) Nebulizer every 6 hours PRN Shortness of Breath and/or Wheezing  bisacodyl Suppository 10 milliGRAM(s) Rectal daily PRN Constipation  dextrose Oral Gel 15 Gram(s) Oral once PRN Blood Glucose LESS THAN 70 milliGRAM(s)/deciliter  guaiFENesin Oral Liquid (Sugar-Free) 200 milliGRAM(s) Oral every 6 hours PRN Cough  morphine  - Injectable 0.5 milliGRAM(s) IV Push every 6 hours PRN Moderate Pain (4 - 6)      ITEMS UNCHECKED ARE NOT PRESENT    PRESENT SYMPTOMS: [ ]Unable to self-report due to altered mental status- see [ ] CPOT [ ] PAINADS [ ] RDOS  Source if other than patient:  [ ]Family   [ ]Team     Pain: [x ]yes [ ]no  QOL impact -   Location -      back              Aggravating factors - movement  Quality -  Radiation - none  Timing- intermittent   Severity (0-10 scale): 6   Minimal acceptable level / Pain goal (0-10 scale): 3      Dyspnea:                           [x ]Mild [ ]Moderate [ ]Severe  Anxiety:                             [x ]Mild [ ]Moderate [ ]Severe  Agitation:                          [ ]Mild [ ]Moderate [ ]Severe  Fatigue:                             [ ]Mild [ ]Moderate [ ]Severe  Nausea:                             [ ]Mild [ ]Moderate [ ]Severe  Loss of appetite:              [ ]Mild [ ]Moderate [ ]Severe  Constipation:                   [ ]Mild [ ]Moderate [ ]Severe  Diarrhea:                          [ ]Mild [ ]Moderate [ ]Severe    CPOT:    https://www.[email or web address]/[email or web address]/Critical-Care-Pain-Observation-Tool-(CPOT)    PCSSQ[Palliative Care Spiritual Screening Question]   Severity (0-10):  Score of 4 or > indicate consideration of Chaplaincy referral.  Chaplaincy Referral: [ ] yes [ ] refused [ ] following [x ] deferred    Caregiver [place]? : [ ] yes [ ] no [ ] Declined [x ] Deferred              Social work referral [ ] Patient & Family Centered Care Referral [ ]     Anticipatory Grief present?:  [ ] yes [ ] no  [ x] Deferred                  Social work referral [ ] Chaplaincy Referral[ ]    Other Symptoms:  [x ]All other review of systems negative     PHYSICAL EXAM:  Vital Signs Last 24 Hrs  T(C): 36.4 (18 May 2023 12:00), Max: 37.1 (17 May 2023 20:00)  T(F): 97.6 (18 May 2023 12:00), Max: 98.8 (17 May 2023 20:00)  HR: 84 (18 May 2023 14:00) (72 - 102)  BP: --  BP(mean): --  RR: 24 (18 May 2023 14:00) (17 - 33)  SpO2: 100% (18 May 2023 14:00) (82% - 100%)    Parameters below as of 18 May 2023 14:00  Patient On (Oxygen Delivery Method): nasal cannula, high flow  O2 Flow (L/min): 60  O2 Concentration (%): 100     I&O's Summary    17 May 2023 07:01  -  18 May 2023 07:00  --------------------------------------------------------  IN: 212.2 mL / OUT: 1345 mL / NET: -1132.8 mL    18 May 2023 07:01  -  18 May 2023 14:21  --------------------------------------------------------  IN: 63 mL / OUT: 265 mL / NET: -202 mL       GENERAL:  [x ]Alert  [x ]Orientedx2   [ ]Lethargic  [ ]Cachexia  [ ]Unarousable  [x ]Verbal  [ ]Non-Verbal    Behavioral:   [x ] Anxiety  [ ] Delirium [ ] Agitation [ ] Calm  [ ] Other  HEENT:  [x ]Normal  [ ] Temporal Wasting  [ ]Dry mouth   [ ]ET Tube/Trach  [ ]Oral lesions  [ ] Mucositis  PULMONARY:   [ ]Clear [ x]Tachypnea  [ ]Audible excessive secretions   [ x]Rhonchi        [ ]Right [ ]Left [ x]Bilateral  [ ]Crackles        [ ]Right [ ]Left [ ]Bilateral  [ ]Wheezing     [ ]Right [ ]Left [ ]Bilateral  [ ]Diminished breath sounds [ ]right [ ]left [ ]bilateral  CARDIOVASCULAR:    [x ]Regular [ ]Irregular [ ]Tachy  [ ]Greg [ ]Murmur [ ]Other  GASTROINTESTINAL:  [x ]Soft  [ ]Distended   [ ]+BS  [x ]Non tender [ ]Tender  [ ]PEG [ ]OGT/ NGT  Last BM: 5/15  GENITOURINARY:  [ ]Normal [ ] Incontinent   [ ]Oliguria/Anuria   [x ]Aceves  MUSCULOSKELETAL:   [ ]Normal   [x ]Weakness  [x ]Bed/Wheelchair bound [ ]Edema  [  ] amputation  [  ] contraction  NEUROLOGIC:   [ x]No focal deficits  [ ]Cognitive impairment  [ ]Dysphagia [ ]Dysarthria [ ]Paresis [ ]Other   SKIN: See Nursing Skin Assessment for further details  [x ]Normal    [ ]Rash  [ ]Pressure ulcer(s)       Present on admission [ ]y [ ]n   [  ]  Wound    [  ] hyperpigmentation      CRITICAL CARE:  [ ]Shock Present  [ ]Septic [ ]Cardiogenic [ ]Neurologic [ ]Hypovolemic  [ ]Vasopressors [ ]Inotropes  [ x]Respiratory failure present [ ]Mechanical Ventilation [ ]Non-invasive ventilatory support [ x]High-Flow   [ ]Acute  [ ]Chronic [x ]Hypoxic  [ ]Hypercarbic [ ]Other  [ ]Other organ failure     LABS:  reviewed                         7.7    18.47 )-----------( 256      ( 18 May 2023 04:10 )             24.5   05-18    135  |  99  |  29<H>  ----------------------------<  177<H>  4.4   |  27  |  0.90    Ca    9.6      18 May 2023 04:10  Phos  3.4     05-18  Mg     1.90     05-18    TPro  6.0  /  Alb  2.6<L>  /  TBili  0.4  /  DBili  x   /  AST  21  /  ALT  12  /  AlkPhos  129<H>  05-18  PTT - ( 17 May 2023 15:45 )  PTT:30.8 sec    CAPILLARY BLOOD GLUCOSE      POCT Blood Glucose.: 222 mg/dL (18 May 2023 12:04)  POCT Blood Glucose.: 279 mg/dL (18 May 2023 08:23)  POCT Blood Glucose.: 122 mg/dL (17 May 2023 23:06)  POCT Blood Glucose.: 89 mg/dL (17 May 2023 22:47)  POCT Blood Glucose.: 381 mg/dL (17 May 2023 17:23)      RADIOLOGY & ADDITIONAL STUDIES: reviewed     Protein Calorie Malnutrition Present: [ ]mild [ ]moderate [ ]severe [ ]underweight [ ]morbid obesity  https://www.andeal.org/vault/2440/web/files/ONC/Table_Clinical%20Characteristics%20to%20Document%20Malnutrition-White%20JV%20et%20al%202012.pdf    Height (cm): 162.6 (05-09-23 @ 23:10)  Weight (kg): 92.9 (05-09-23 @ 23:10)  BMI (kg/m2): 35.1 (05-09-23 @ 23:10)    [ ]PPSV2 < or = 30%  [ ]significant weight loss [ ]poor nutritional intake [ ]anasarca   [ ]Artificial Nutrition    REFERRALS:   [ ]Chaplaincy  [ ]Hospice  [ ]Child Life  [ ]Social Work  [x ]Case management [ ]Holistic Therapy

## 2023-05-18 NOTE — PROGRESS NOTE ADULT - SUBJECTIVE AND OBJECTIVE BOX
INTERVAL HPI/OVERNIGHT EVENTS:    SUBJECTIVE: Patient seen and examined at bedside. Overnight was restarted on precedex for restlessness. Nitric oxide remains at stable dose. HFNC still at 60/80 though had transient episodes of desaturations.     CONSTITUTIONAL: No weakness, fevers or chills  EYES/ENT: No visual changes;  No vertigo or throat pain   NECK: No pain or stiffness  RESPIRATORY: No cough, wheezing, hemoptysis; No shortness of breath  CARDIOVASCULAR: No chest pain or palpitations  GASTROINTESTINAL: No abdominal or epigastric pain. No nausea, vomiting, or hematemesis; No diarrhea or constipation. No melena or hematochezia.      OBJECTIVE:    VITAL SIGNS:  ICU Vital Signs Last 24 Hrs  T(C): 36.7 (18 May 2023 04:00), Max: 37.1 (17 May 2023 20:00)  T(F): 98 (18 May 2023 04:00), Max: 98.8 (17 May 2023 20:00)  HR: 87 (18 May 2023 07:00) (73 - 102)  BP: --  BP(mean): --  ABP: 125/66 (18 May 2023 07:00) (77/42 - 136/62)  ABP(mean): 89 (18 May 2023 07:00) (56 - 89)  RR: 33 (18 May 2023 02:00) (14 - 36)  SpO2: 99% (18 May 2023 07:00) (82% - 100%)    O2 Parameters below as of 18 May 2023 05:00    O2 Flow (L/min): 60  O2 Concentration (%): 100          05-17 @ 07:01  -  05-18 @ 07:00  --------------------------------------------------------  IN: 212.2 mL / OUT: 1345 mL / NET: -1132.8 mL      CAPILLARY BLOOD GLUCOSE      POCT Blood Glucose.: 122 mg/dL (17 May 2023 23:06)      PHYSICAL EXAM:    Constitutional: no acute distress   HEENT: + PERRLA, EOMI, no drainage or redness  Neck: supple,  No JVD  Respiratory: scattered Rhonchi B/L    Cardiovascular: Regular rate, regular rhythm, normal S1, S2; no murmurs or rub  Gastrointestinal: obese, soft, non-tender, non distended, no hepatosplenomegaly, normal bowel sounds  Extremities: + 2 peripheral edema, no cyanosis, no clubbing   Vascular: Equal and normal pulses: 2+ peripheral pulses throughout  Neurological: alert and oriented   Psychiatric: anxious at times   Musculoskeletal: No joint swelling or deformity; no limitation of extremities, but noted weakness  Skin: warm, dry, well perfused, no rashes, ecchymotic upper extremities     MEDICATIONS:  MEDICATIONS  (STANDING):  amitriptyline 10 milliGRAM(s) Oral at bedtime  atorvastatin 20 milliGRAM(s) Oral at bedtime  buMETAnide Injectable 1 milliGRAM(s) IV Push daily  chlorhexidine 2% Cloths 1 Application(s) Topical <User Schedule>  cholecalciferol 1000 Unit(s) Oral daily  DAPTOmycin IVPB 650 milliGRAM(s) IV Intermittent every 24 hours  dexMEDEtomidine Infusion 0.1 MICROgram(s)/kG/Hr (2.32 mL/Hr) IV Continuous <Continuous>  dextrose 5%. 1000 milliLiter(s) (50 mL/Hr) IV Continuous <Continuous>  dextrose 5%. 1000 milliLiter(s) (100 mL/Hr) IV Continuous <Continuous>  dextrose 50% Injectable 12.5 Gram(s) IV Push once  dextrose 50% Injectable 25 Gram(s) IV Push once  dextrose 50% Injectable 25 Gram(s) IV Push once  DULoxetine 30 milliGRAM(s) Oral daily  fluticasone propionate 50 MICROgram(s)/spray Nasal Spray 1 Spray(s) Both Nostrils two times a day  glucagon  Injectable 1 milliGRAM(s) IntraMuscular once  heparin  Infusion.  Unit(s)/Hr (17 mL/Hr) IV Continuous <Continuous>  hydrocodone/homatropine Syrup 5 milliLiter(s) Oral every 8 hours  insulin glargine Injectable (LANTUS) 14 Unit(s) SubCutaneous at bedtime  insulin lispro (ADMELOG) corrective regimen sliding scale   SubCutaneous three times a day before meals  insulin lispro (ADMELOG) corrective regimen sliding scale   SubCutaneous at bedtime  insulin lispro Injectable (ADMELOG) 7 Unit(s) SubCutaneous three times a day before meals  lactobacillus acidophilus 1 Tablet(s) Oral three times a day with meals  lidocaine   4% Patch 1 Patch Transdermal daily  methylPREDNISolone sodium succinate Injectable 40 milliGRAM(s) IV Push daily  metoprolol tartrate 25 milliGRAM(s) Oral three times a day  pantoprazole  Injectable 40 milliGRAM(s) IV Push every 12 hours  sildenafil (REVATIO) 20 milliGRAM(s) Oral every 8 hours  sodium chloride 0.65% Nasal 1 Spray(s) Both Nostrils two times a day  trimethoprim  40 mG/sulfamethoxazole 200 mG Suspension 400 milliGRAM(s) Oral every 8 hours    MEDICATIONS  (PRN):  albuterol/ipratropium for Nebulization 3 milliLiter(s) Nebulizer every 6 hours PRN Shortness of Breath and/or Wheezing  bisacodyl Suppository 10 milliGRAM(s) Rectal daily PRN Constipation  dextrose Oral Gel 15 Gram(s) Oral once PRN Blood Glucose LESS THAN 70 milliGRAM(s)/deciliter  guaiFENesin Oral Liquid (Sugar-Free) 200 milliGRAM(s) Oral every 6 hours PRN Cough      ALLERGIES:  Allergies    penicillins (Other)    Intolerances        LABS:                        7.7    18.47 )-----------( 256      ( 18 May 2023 04:10 )             24.5     05-18    135  |  99  |  29<H>  ----------------------------<  177<H>  4.4   |  27  |  0.90    Ca    9.6      18 May 2023 04:10  Phos  3.4     05-18  Mg     1.90     05-18    TPro  6.0  /  Alb  2.6<L>  /  TBili  0.4  /  DBili  x   /  AST  21  /  ALT  12  /  AlkPhos  129<H>  05-18    PTT - ( 17 May 2023 15:45 )  PTT:30.8 sec      RADIOLOGY & ADDITIONAL TESTS: Reviewed.

## 2023-05-19 NOTE — PROGRESS NOTE ADULT - PROBLEM SELECTOR PLAN 1
- Patient restless during encounter.   - Consider IV Ativan 0.25mg PRN. - In past 24 hours, received 2 doses of IV Morphine 0.5mg.   - Can considering increasing to IV Morphine 2mg PRN pain/dyspnea (if renal function wnl)  If patient with SHAAN can switch instead to IV Dilaudid 0.2mg PRN

## 2023-05-19 NOTE — PROGRESS NOTE ADULT - PROBLEM SELECTOR PLAN 7
Case reviewed with primary team.   Thank you for allowing us to participate in your patient's care. Please page [phone] for any questions/concerns.

## 2023-05-19 NOTE — PROGRESS NOTE ADULT - PROBLEM SELECTOR PLAN 3
- possibly due to ILD flare, PJP PNA and fluid overload 2/2 acute on chronic decompensated RV failure  - CXRAY -  diffuse opacities consistent with interstitial lung disease.  - TTE-  Septal motion consistent with right ventricular overload. Flattening of the interventricular septum in both systole and diastole is consistent with right ventricular pressure overload.  Right ventricular enlargement with decreased right ventricular systolic function. Severe tricuspid regurgitation. Estimated pulmonary artery systolic pressure equals 96 mm Hg, assuming right atrial pressure equals 3  mm Hg, there is echocardiographic evidence of severe pulmonary hypertension.  - management as per primary team. Patient requires support with ADLs. Please assist with ADLs PRN.  Skin care as per protocol.

## 2023-05-19 NOTE — PROGRESS NOTE ADULT - ATTENDING COMMENTS
Pt seen and examined. 62F with extensive history including ILD thought secondary to prior Methotrexate use, severe pulm HTN RA and Psoriatic arthritis, now with acute on chronic hypoxic resp failure 2/2 a combination of possible ILD flare, probable PJP PNA and fluid overload 2/2 acute on chronic decompensated RV failure, MRSA bacteremia, SHAAN 2/2 pre-renal ATN and L axillary DVT. Heparin gtt stopped overnight due to rectal bleeding. Repeat CBC with Hb trending down to 7.9, remains on Protonix BID. Remains on HFNC at 60 L/100 %, Angie being titrated off. Remains on Sildenafil, Bactrim, solumedrol IV and Bumex. Cr trended up slightly, now walso with progressive leukocytosis to 29 K. Remains on Daptomycin IV for MRSA bacteremia, BCxs from 5/9 NGTD. Unable to get ELICEO to r/o endocarditis d/t resp instability. Overall prognosis extremely poor. Not a transplant candidate. Family meeting at 2 pm today to address GOC and next best steps in management.

## 2023-05-19 NOTE — PROGRESS NOTE ADULT - PROBLEM SELECTOR PLAN 5
Patient AAOx2 with delirium. She stated she was aware she had lung problem. When asked about her conversation with the transplant team yesterday, she stated she was still waiting for them to speak to her.   Patient restless in bed.     Attempted to reach out to daughter Joyce. Voicemail left for daughter. Awaiting callback.    Agree with IV Morphine 0.5mg PRN for pain/dyspnea.    Case reviewed with primary team. Plan for possible family meeting tomorrow.   Thank you for allowing us to participate in your patient's care. Please page [phone] for any questions/concerns. - Transplant team recommendations appreciated   - management as per primary team.

## 2023-05-19 NOTE — PROGRESS NOTE ADULT - PROBLEM SELECTOR PLAN 4
- Transplant team recommendations appreciated   - management as per primary team. - possibly due to ILD flare, PJP PNA and fluid overload 2/2 acute on chronic decompensated RV failure  - CXRAY -  diffuse opacities consistent with interstitial lung disease.  - TTE-  Septal motion consistent with right ventricular overload. Flattening of the interventricular septum in both systole and diastole is consistent with right ventricular pressure overload.  Right ventricular enlargement with decreased right ventricular systolic function. Severe tricuspid regurgitation. Estimated pulmonary artery systolic pressure equals 96 mm Hg, assuming right atrial pressure equals 3  mm Hg, there is echocardiographic evidence of severe pulmonary hypertension.  - Patient on HFNC with settings of 60/100 today  - dyspnea management as above   - management as per primary team.

## 2023-05-19 NOTE — PROGRESS NOTE ADULT - PROBLEM SELECTOR PLAN 6
Please see GOC note for further details. Please see GOC note for further details.  Pt DNR/DNI - MOLST in chart

## 2023-05-19 NOTE — PROGRESS NOTE ADULT - SUBJECTIVE AND OBJECTIVE BOX
Follow Up:  MRSA bacteremia outside hospital     Interval History/ROS:  remains on HFNC in MICU          Allergies  penicillins (Other)        ANTIMICROBIALS: DAPTOmycin IVPB 650 every 24 hours  trimethoprim  40 mG/sulfamethoxazole 200 mG Suspension 400 every 8 hours      MEDICATIONS  (STANDING):  albuterol/ipratropium for Nebulization 3 every 6 hours PRN  amitriptyline 10 at bedtime  atorvastatin 20 at bedtime  bisacodyl Suppository 10 daily PRN  buMETAnide 1 daily  dextrose 50% Injectable 25 once  dextrose 50% Injectable 25 once  dextrose 50% Injectable 12.5 once  dextrose Oral Gel 15 once PRN  DULoxetine 30 daily  glucagon  Injectable 1 once  guaiFENesin Oral Liquid (Sugar-Free) 200 every 6 hours PRN  heparin  Infusion 1100 <Continuous>  hydrocodone/homatropine Syrup 5 every 8 hours  insulin glargine Injectable (LANTUS) 14 at bedtime  insulin lispro (ADMELOG) corrective regimen sliding scale  at bedtime  insulin lispro (ADMELOG) corrective regimen sliding scale  three times a day before meals  insulin lispro Injectable (ADMELOG) 7 three times a day before meals  methylPREDNISolone sodium succinate Injectable 40 daily  metoprolol tartrate 25 three times a day  morphine  - Injectable 2 every 6 hours PRN  morphine  - Injectable 1 every 6 hours PRN  pantoprazole  Injectable 40 every 12 hours  sildenafil (REVATIO) 20 every 8 hours    Vital Signs Last 24 Hrs  T(F): 99.3 (05-19-23 @ 08:00), Max: 99.3 (05-19-23 @ 08:00)  HR: 72 (05-19-23 @ 12:00)  BP: --  RR: 20 (05-19-23 @ 12:00)  SpO2: 100% (05-19-23 @ 12:00) (91% - 100%)    PHYSICAL EXAM:  Constitutional: lethargic, restless when awakes during exam  HFNC  RS: BS bilat  CVS: S1, S2   Abdomen: Soft. No guarding/rigidity/tenderness.  Extremities: ecchymosis right arm fading   Vascular:  IC x2 right arm  [place] right radial  Neuro: Cranial nerves 2-12 grossly normal. No focal abnormalities                                     7.9    29.39 )-----------( 377      ( 19 May 2023 06:00 )             26.4 05-19    133  |  96  |  27  ----------------------------<  134  4.5   |  25  |  1.04  Ca    10.0      19 May 2023 00:45Phos  3.1     05-19Mg     1.60     05-19  TPro  6.3  /  Alb  2.8  /  TBili  0.4  /  DBili  x   /  AST  20  /  ALT  15  /  AlkPhos  150  05-19      ckCreatine Kinase, Serum: 23    MICROBIOLOGY:    .Blood Blood-Peripheral  05-09-23   No growth  --  --      .Blood Blood-Peripheral  05-09-23   No growth  --  --        Rapid RVP Result: NotDetec (05-10 @ 06:36)    Fungitell: 246:     RADIOLOGY:    r< from: Xray Chest 1 View- PORTABLE-Urgent (Xray Chest 1 View- PORTABLE-Urgent .) (05.09.23 @ 23:43) >    ACC: [id number] EXAM:  XR CHEST PORTABLE URGENT 1V   ORDERED BY: VERO RUFF     PROCEDURE DATE:  05/09/2023          INTERPRETATION:  CLINICAL INFORMATION: Hypoxia    TIME OF EXAMINATION: May 9 at 11:34 PM    EXAM: Portable chest    FINDINGS:  Low lung volumes with increased markings consistent with interstitial   lung disease considering past studies including chest CT.    No new focal consolidation to indicate pneumonia.    Heart size is stable. No effusions or pneumothorax        COMPARISON: May 8        IMPRESSION: Follow-up with diffuse opacities consistent with interstitial   lung disease.    --- End of Report ---            DARNELL VILLALPANDO MD; Attending Radiologist  This document has been electronically signed. May 10 2023  9:14AM    < end of copied text >

## 2023-05-19 NOTE — PROGRESS NOTE ADULT - SUBJECTIVE AND OBJECTIVE BOX
INTERVAL HPI/OVERNIGHT EVENTS:    SUBJECTIVE: Patient seen and examined at bedside. Patient had bright red blood per rectum and the heparin drip was eventually stopped. Patient continues to be restless and requiring morphine pushes as needed.     ROS: Unable to fully assess       OBJECTIVE:    VITAL SIGNS:  ICU Vital Signs Last 24 Hrs  T(C): 37 (19 May 2023 04:00), Max: 37.1 (18 May 2023 20:00)  T(F): 98.6 (19 May 2023 04:00), Max: 98.7 (18 May 2023 20:00)  HR: 89 (19 May 2023 07:00) (72 - 107)  BP: --  BP(mean): --  ABP: 94/60 (19 May 2023 07:00) (80/44 - 120/63)  ABP(mean): 74 (19 May 2023 07:00) (59 - 87)  RR: 23 (19 May 2023 07:00) (17 - 28)  SpO2: 94% (19 May 2023 06:54) (91% - 100%)    O2 Parameters below as of 19 May 2023 06:54  Patient On (Oxygen Delivery Method): nasal cannula, high flow  O2 Flow (L/min): 60  O2 Concentration (%): 100          05-18 @ 07:01  -  05-19 @ 07:00  --------------------------------------------------------  IN: 358 mL / OUT: 1345 mL / NET: -987 mL      CAPILLARY BLOOD GLUCOSE      POCT Blood Glucose.: 151 mg/dL (18 May 2023 21:40)      PHYSICAL EXAM:  HEENT: + PERRLA, EOMI, no drainage or redness  Neck: supple,  No JVD  Respiratory: scattered Rhonchi B/L    Cardiovascular: Regular rate, regular rhythm, normal S1, S2; no murmurs or rub  Gastrointestinal: obese, soft, non-tender, non distended, no hepatosplenomegaly, normal bowel sounds  Extremities: + 2 peripheral edema, no cyanosis, no clubbing   Vascular: Equal and normal pulses: 2+ peripheral pulses throughout  Neurological: alert and oriented   Psychiatric: anxious at times   Musculoskeletal: No joint swelling or deformity; no limitation of extremities, but noted weakness  Skin: warm, dry, well perfused, no rashes, ecchymotic upper extremities         MEDICATIONS:  MEDICATIONS  (STANDING):  amitriptyline 10 milliGRAM(s) Oral at bedtime  atorvastatin 20 milliGRAM(s) Oral at bedtime  buMETAnide 1 milliGRAM(s) Oral daily  chlorhexidine 2% Cloths 1 Application(s) Topical <User Schedule>  cholecalciferol 1000 Unit(s) Oral daily  DAPTOmycin IVPB 650 milliGRAM(s) IV Intermittent every 24 hours  dextrose 5%. 1000 milliLiter(s) (100 mL/Hr) IV Continuous <Continuous>  dextrose 5%. 1000 milliLiter(s) (50 mL/Hr) IV Continuous <Continuous>  dextrose 50% Injectable 25 Gram(s) IV Push once  dextrose 50% Injectable 25 Gram(s) IV Push once  dextrose 50% Injectable 12.5 Gram(s) IV Push once  DULoxetine 30 milliGRAM(s) Oral daily  fluticasone propionate 50 MICROgram(s)/spray Nasal Spray 1 Spray(s) Both Nostrils two times a day  glucagon  Injectable 1 milliGRAM(s) IntraMuscular once  heparin  Infusion 1100 Unit(s)/Hr (11 mL/Hr) IV Continuous <Continuous>  hydrocodone/homatropine Syrup 5 milliLiter(s) Oral every 8 hours  insulin glargine Injectable (LANTUS) 14 Unit(s) SubCutaneous at bedtime  insulin lispro (ADMELOG) corrective regimen sliding scale   SubCutaneous three times a day before meals  insulin lispro (ADMELOG) corrective regimen sliding scale   SubCutaneous at bedtime  insulin lispro Injectable (ADMELOG) 7 Unit(s) SubCutaneous three times a day before meals  lactobacillus acidophilus 1 Tablet(s) Oral three times a day with meals  lidocaine   4% Patch 1 Patch Transdermal daily  magnesium sulfate  IVPB 2 Gram(s) IV Intermittent every 2 hours  methylPREDNISolone sodium succinate Injectable 40 milliGRAM(s) IV Push daily  metoprolol tartrate 25 milliGRAM(s) Oral three times a day  pantoprazole  Injectable 40 milliGRAM(s) IV Push every 12 hours  sildenafil (REVATIO) 20 milliGRAM(s) Oral every 8 hours  sodium chloride 0.65% Nasal 1 Spray(s) Both Nostrils two times a day  trimethoprim  40 mG/sulfamethoxazole 200 mG Suspension 400 milliGRAM(s) Oral every 8 hours    MEDICATIONS  (PRN):  albuterol/ipratropium for Nebulization 3 milliLiter(s) Nebulizer every 6 hours PRN Shortness of Breath and/or Wheezing  bisacodyl Suppository 10 milliGRAM(s) Rectal daily PRN Constipation  dextrose Oral Gel 15 Gram(s) Oral once PRN Blood Glucose LESS THAN 70 milliGRAM(s)/deciliter  guaiFENesin Oral Liquid (Sugar-Free) 200 milliGRAM(s) Oral every 6 hours PRN Cough  morphine  - Injectable 0.5 milliGRAM(s) IV Push every 6 hours PRN Moderate Pain (4 - 6)      ALLERGIES:  Allergies    penicillins (Other)    Intolerances        LABS:                        7.9    29.39 )-----------( 377      ( 19 May 2023 06:00 )             26.4     05-19    133<L>  |  96<L>  |  27<H>  ----------------------------<  134<H>  4.5   |  25  |  1.04    Ca    10.0      19 May 2023 00:45  Phos  3.1     05-19  Mg     1.60     05-19    TPro  6.3  /  Alb  2.8<L>  /  TBili  0.4  /  DBili  x   /  AST  20  /  ALT  15  /  AlkPhos  150<H>  05-19    PT/INR - ( 19 May 2023 06:00 )   PT: 13.9 sec;   INR: 1.20 ratio         PTT - ( 19 May 2023 06:00 )  PTT:60.4 sec      RADIOLOGY & ADDITIONAL TESTS: Reviewed.

## 2023-05-19 NOTE — PROGRESS NOTE ADULT - ASSESSMENT
62F with ILD on 3.5L home O2/CPAP and chronic prednisone, pulmonary HTN, DM2, RA, psoriatic arthritis presented to [facility] ED for worsening dyspnea and hypoxia April 2023, admitted for ILD with exacerbation and acute on chronic hypoxic respiratory failure. She was treated with high-dose corticosteroids and started on HFNC, course complicated by AMS, found to have demonstrated high grade L-sided carotid stenosis, course further complicated by worsening hypoxia requiring BIPAP and fever, transferred to MICU, found to have MRSA bacteremia (4/24), initially treated with Vancomycin, switched to Daptomycin (4/25) with subsequent increased dose on (5/1), Blood culture cleared on (5/4), suspected source was from superficial thrombophlebitis, seen on US (4/17: Superficial thrombus is seen in the left cephalic vein at the level of the forearm. No DVT), planned for 6 weeks course of dapto with Rifampin 300mg PO q12 for total of 2 week course, course again complicated by low grade fever 100.7F (5/8), added meropenem for empiric coverage. Eventually patient transferred to [facility], ID consulted for assistance.    Denies any cardiac device, hardware, or prosthetic joint replacement  has not received biologics for arthritis in last year     CXR with diffuse opacity consistent with ILD  US LUE (4/17): Superficial thrombus is seen in the left cephalic vein at the level of the forearm. No DVT   VA duplex 5/16 DVT+ left axillary   BCx (4/24, 4/25, 4/27, 4/28, 5/1, 5/2) positive for MRSA  BCx (5/4) no growth  TTE (5/10) without obvious vegetation   BCx 5/9 no growth     Antimicrobials :    DAPTOmycin IVPB    DAPTOmycin IVPB 650 every 24 hours  5/10-   meropenem  IVPB 1000 every 8 hours   5/9 - 5/14  trimethoprim  40 mG/sulfamethoxazole 200 mG Suspension 400 every 8 hours 5/10-      #MRSA Bacteremia   high grade at outside hospital   unclear source cultures here 5/9 no growth   #Acute Hypoxic Respiratory Failure 2/2 ILD    - bacteremia source was suspected L thrombophlebitis at [place] , but still consider endocarditis in ddx no obvious joint infection though is a consideration in patient with psoriatic arthritis   now with duplex showing DVT left axillary  This could represent endovascular source     - completed course meropenem for possible HAP  - bactrim added for possible pcp -elevated fungitell     #Leucocytosis   WBC trended up today 29K   concern for recurrence of bacteremia or other new focus  of infection     doubt ELICEO or Indium feasible at this time    RECOMMENDATIONS      -blood culture x 2  - c/w  daptomycin   - c/w bactrim po treatment dose  - if diarrhea check stool for c dif     ID service available over weekend   62F with ILD on 3.5L home O2/CPAP and chronic prednisone, pulmonary HTN, DM2, RA, psoriatic arthritis presented to [facility] ED for worsening dyspnea and hypoxia April 2023, admitted for ILD with exacerbation and acute on chronic hypoxic respiratory failure. She was treated with high-dose corticosteroids and started on HFNC, course complicated by AMS, found to have demonstrated high grade L-sided carotid stenosis, course further complicated by worsening hypoxia requiring BIPAP and fever, transferred to MICU, found to have MRSA bacteremia (4/24), initially treated with Vancomycin, switched to Daptomycin (4/25) with subsequent increased dose on (5/1), Blood culture cleared on (5/4), suspected source was from superficial thrombophlebitis, seen on US (4/17: Superficial thrombus is seen in the left cephalic vein at the level of the forearm. No DVT), planned for 6 weeks course of dapto with Rifampin 300mg PO q12 for total of 2 week course, course again complicated by low grade fever 100.7F (5/8), added meropenem for empiric coverage. Eventually patient transferred to [facility], ID consulted for assistance.    Denies any cardiac device, hardware, or prosthetic joint replacement  has not received biologics for arthritis in last year     CXR with diffuse opacity consistent with ILD  US LUE (4/17): Superficial thrombus is seen in the left cephalic vein at the level of the forearm. No DVT   VA duplex 5/16 DVT+ left axillary   BCx (4/24, 4/25, 4/27, 4/28, 5/1, 5/2) positive for MRSA  BCx (5/4) no growth  TTE (5/10) without obvious vegetation   BCx 5/9 no growth     Antimicrobials :    DAPTOmycin IVPB    DAPTOmycin IVPB 650 every 24 hours  5/10-   meropenem  IVPB 1000 every 8 hours   5/9 - 5/14  trimethoprim  40 mG/sulfamethoxazole 200 mG Suspension 400 every 8 hours 5/10-      #MRSA Bacteremia   high grade at outside hospital   unclear source cultures here 5/9 no growth   #Acute Hypoxic Respiratory Failure 2/2 ILD    - bacteremia source was suspected L thrombophlebitis at [place] , but still consider endocarditis in ddx no obvious joint infection though is a consideration in patient with psoriatic arthritis   now with duplex showing DVT left axillary  This could represent endovascular source     - completed course meropenem for possible HAP  - bactrim added for possible pcp -elevated fungitell     #Leucocytosis   WBC trended up today 29K   concern for recurrence of bacteremia or other new focus  of infection   could also be reactive to GI bleed     doubt ELICEO or Indium feasible at this time    RECOMMENDATIONS      -blood culture x 2  - c/w  daptomycin   - c/w bactrim po treatment dose   - if diarrhea check stool for c dif     ID service available over weekend

## 2023-05-19 NOTE — PROGRESS NOTE ADULT - SUBJECTIVE AND OBJECTIVE BOX
Date of Service  : 5/19/2023    SUBJECTIVE AND OBJECTIVE:  Patient seen and examined at bedside. Patient with tachypnea and appeared uncomfortable this AM   Resting as she had received IV Ativan a few minutes ago for restlessness    INTERVAL HPI/OVERNIGHT EVENTS:  In past 24 hours, received 2 doses of IV Morphine 0.5mg     Allergies    penicillins (Other)    Intolerances    MEDICATIONS  (STANDING):  amitriptyline 10 milliGRAM(s) Oral at bedtime  atorvastatin 20 milliGRAM(s) Oral at bedtime  buMETAnide 1 milliGRAM(s) Oral daily  chlorhexidine 2% Cloths 1 Application(s) Topical <User Schedule>  cholecalciferol 1000 Unit(s) Oral daily  DAPTOmycin IVPB 650 milliGRAM(s) IV Intermittent every 24 hours  dextrose 5%. 1000 milliLiter(s) (100 mL/Hr) IV Continuous <Continuous>  dextrose 5%. 1000 milliLiter(s) (50 mL/Hr) IV Continuous <Continuous>  dextrose 50% Injectable 25 Gram(s) IV Push once  dextrose 50% Injectable 25 Gram(s) IV Push once  dextrose 50% Injectable 12.5 Gram(s) IV Push once  DULoxetine 30 milliGRAM(s) Oral daily  fluticasone propionate 50 MICROgram(s)/spray Nasal Spray 1 Spray(s) Both Nostrils two times a day  glucagon  Injectable 1 milliGRAM(s) IntraMuscular once  heparin  Infusion.  Unit(s)/Hr (17 mL/Hr) IV Continuous <Continuous>  hydrocodone/homatropine Syrup 5 milliLiter(s) Oral every 8 hours  insulin glargine Injectable (LANTUS) 14 Unit(s) SubCutaneous at bedtime  insulin lispro (ADMELOG) corrective regimen sliding scale   SubCutaneous at bedtime  insulin lispro (ADMELOG) corrective regimen sliding scale   SubCutaneous three times a day before meals  insulin lispro Injectable (ADMELOG) 7 Unit(s) SubCutaneous three times a day before meals  lactobacillus acidophilus 1 Tablet(s) Oral three times a day with meals  lidocaine   4% Patch 1 Patch Transdermal daily  methylPREDNISolone sodium succinate Injectable 40 milliGRAM(s) IV Push daily  metoprolol tartrate 25 milliGRAM(s) Oral three times a day  pantoprazole  Injectable 40 milliGRAM(s) IV Push every 12 hours  sildenafil (REVATIO) 20 milliGRAM(s) Oral every 8 hours  sodium chloride 0.65% Nasal 1 Spray(s) Both Nostrils two times a day  trimethoprim  40 mG/sulfamethoxazole 200 mG Suspension 400 milliGRAM(s) Oral every 8 hours    MEDICATIONS  (PRN):  albuterol/ipratropium for Nebulization 3 milliLiter(s) Nebulizer every 6 hours PRN Shortness of Breath and/or Wheezing  bisacodyl Suppository 10 milliGRAM(s) Rectal daily PRN Constipation  dextrose Oral Gel 15 Gram(s) Oral once PRN Blood Glucose LESS THAN 70 milliGRAM(s)/deciliter  guaiFENesin Oral Liquid (Sugar-Free) 200 milliGRAM(s) Oral every 6 hours PRN Cough  morphine  - Injectable 0.5 milliGRAM(s) IV Push every 6 hours PRN Moderate Pain (4 - 6)      ITEMS UNCHECKED ARE NOT PRESENT    PRESENT SYMPTOMS: [ ]Unable to self-report due to altered mental status- see [ ] CPOT [ ] PAINADS [ ] RDOS  Source if other than patient:  [ ]Family   [ ]Team     Pain: [x ]yes [ ]no  QOL impact -   Location -      back              Aggravating factors - movement  Quality -  Radiation - none  Timing- intermittent   Severity (0-10 scale): 6   Minimal acceptable level / Pain goal (0-10 scale): 3      Dyspnea:                           [ ]Mild [x ]Moderate [ ]Severe  Anxiety:                             [x ]Mild [ ]Moderate [ ]Severe  Agitation:                          [ ]Mild [ ]Moderate [ ]Severe  Fatigue:                             [ ]Mild [ ]Moderate [ ]Severe  Nausea:                             [ ]Mild [ ]Moderate [ ]Severe  Loss of appetite:              [ ]Mild [ ]Moderate [ ]Severe  Constipation:                   [ ]Mild [ ]Moderate [ ]Severe  Diarrhea:                          [ ]Mild [ ]Moderate [ ]Severe    CPOT:    https://[email or web address]/getattachment/[email or web address]/Critical-Care-Pain-Observation-Tool-(CPOT)    PCSSQ[Palliative Care Spiritual Screening Question]   Severity (0-10):  Score of 4 or > indicate consideration of Chaplaincy referral.  Chaplaincy Referral: [ ] yes [ ] refused [ ] following [x ] deferred    Caregiver [place]? : [ ] yes [ ] no [ ] Declined [x ] Deferred              Social work referral [ ] Patient & Family Centered Care Referral [ ]     Anticipatory Grief present?:  [ ] yes [ ] no  [ x] Deferred                  Social work referral [ ] Chaplaincy Referral[ ]    Other Symptoms:  [x ]All other review of systems negative     PHYSICAL EXAM:  Vital Signs Last 24 Hrs  T(C): 37.4 (19 May 2023 08:00), Max: 37.4 (19 May 2023 08:00)  T(F): 99.3 (19 May 2023 08:00), Max: 99.3 (19 May 2023 08:00)  HR: 76 (19 May 2023 11:27) (75 - 107)  BP: --  BP(mean): --  RR: 24 (19 May 2023 11:27) (18 - 33)  SpO2: 97% (19 May 2023 11:27) (91% - 100%)    Parameters below as of 19 May 2023 11:27  Patient On (Oxygen Delivery Method): nasal cannula, high flow  O2 Flow (L/min): 60  O2 Concentration (%): 100       GENERAL: Resting after ativan dose  [ ]Alert  [ ]Oriented   [ ]Lethargic  [ ]Cachexia  [ ]Unarousable  [ ]Verbal  [ ]Non-Verbal    Behavioral:   [x ] Anxiety  [ ] Delirium [ ] Agitation [ ] Calm  [ ] Other  HEENT:  [ x]Normal  [ ] Temporal Wasting  [ ]Dry mouth   [ ]ET Tube/Trach  [ ]Oral lesions  [ ] Mucositis  PULMONARY:   [ ]Clear [ x]Tachypnea  [ ]Audible excessive secretions   [ x]Rhonchi        [ ]Right [ ]Left [ x]Bilateral  [ ]Crackles        [ ]Right [ ]Left [ ]Bilateral  [ ]Wheezing     [ ]Right [ ]Left [ ]Bilateral  [ ]Diminished breath sounds [ ]right [ ]left [ ]bilateral  CARDIOVASCULAR:    [x ]Regular [ ]Irregular [ ]Tachy  [ ]Greg [ ]Murmur [ ]Other  GASTROINTESTINAL:  [x ]Soft  [ ]Distended   [ ]+BS  [x ]Non tender [ ]Tender  [ ]PEG [ ]OGT/ NGT  Last BM: 5/15  GENITOURINARY:  [ ]Normal [ ] Incontinent   [ ]Oliguria/Anuria   [x ]Aceves  MUSCULOSKELETAL:   [ ]Normal   [x ]Weakness  [x ]Bed/Wheelchair bound [ ]Edema  [  ] amputation  [  ] contraction  NEUROLOGIC:   [ x]No focal deficits  [ ]Cognitive impairment  [ ]Dysphagia [ ]Dysarthria [ ]Paresis [ ]Other   SKIN: See Nursing Skin Assessment for further details  [x ]Normal    [ ]Rash  [ ]Pressure ulcer(s)       Present on admission [ ]y [ ]n   [  ]  Wound    [  ] hyperpigmentation      CRITICAL CARE:  [ ]Shock Present  [ ]Septic [ ]Cardiogenic [ ]Neurologic [ ]Hypovolemic  [ ]Vasopressors [ ]Inotropes  [ x]Respiratory failure present [ ]Mechanical Ventilation [ ]Non-invasive ventilatory support [ x]High-Flow   [ ]Acute  [ ]Chronic [x ]Hypoxic  [ ]Hypercarbic [ ]Other  [ ]Other organ failure     LABS:  reviewed                         7.7    18.47 )-----------( 256      ( 18 May 2023 04:10 )             24.5   05-18    135  |  99  |  29<H>  ----------------------------<  177<H>  4.4   |  27  |  0.90    Ca    9.6      18 May 2023 04:10  Phos  3.4     05-18  Mg     1.90     05-18    TPro  6.0  /  Alb  2.6<L>  /  TBili  0.4  /  DBili  x   /  AST  21  /  ALT  12  /  AlkPhos  129<H>  05-18  PTT - ( 17 May 2023 15:45 )  PTT:30.8 sec    CAPILLARY BLOOD GLUCOSE      POCT Blood Glucose.: 222 mg/dL (18 May 2023 12:04)  POCT Blood Glucose.: 279 mg/dL (18 May 2023 08:23)  POCT Blood Glucose.: 122 mg/dL (17 May 2023 23:06)  POCT Blood Glucose.: 89 mg/dL (17 May 2023 22:47)  POCT Blood Glucose.: 381 mg/dL (17 May 2023 17:23)      RADIOLOGY & ADDITIONAL STUDIES: reviewed     Protein Calorie Malnutrition Present: [ ]mild [ ]moderate [ ]severe [ ]underweight [ ]morbid obesity  https://www.andeal.org/vault/6280/web/files/ONC/Table_Clinical%20Characteristics%20to%20Document%20Malnutrition-White%20JV%20et%20al%202012.pdf    Height (cm): 162.6 (05-09-23 @ 23:10)  Weight (kg): 92.9 (05-09-23 @ 23:10)  BMI (kg/m2): 35.1 (05-09-23 @ 23:10)    [ ]PPSV2 < or = 30%  [ ]significant weight loss [ ]poor nutritional intake [ ]anasarca   [ ]Artificial Nutrition    REFERRALS:   [ ]Chaplaincy  [ ]Hospice  [ ]Child Life  [ ]Social Work  [x ]Case management [ ]Holistic Therapy    Date of Service  : 5/19/2023    SUBJECTIVE AND OBJECTIVE:  Patient seen and examined at bedside. Patient with tachypnea and appeared uncomfortable this AM   Resting as she had received IV Ativan a few minutes ago for restlessness    INTERVAL HPI/OVERNIGHT EVENTS:  In past 24 hours, received 2 doses of IV Morphine 0.5mg     Allergies    penicillins (Other)    Intolerances    MEDICATIONS  (STANDING):  amitriptyline 10 milliGRAM(s) Oral at bedtime  atorvastatin 20 milliGRAM(s) Oral at bedtime  buMETAnide 1 milliGRAM(s) Oral daily  chlorhexidine 2% Cloths 1 Application(s) Topical <User Schedule>  cholecalciferol 1000 Unit(s) Oral daily  DAPTOmycin IVPB 650 milliGRAM(s) IV Intermittent every 24 hours  dextrose 5%. 1000 milliLiter(s) (100 mL/Hr) IV Continuous <Continuous>  dextrose 5%. 1000 milliLiter(s) (50 mL/Hr) IV Continuous <Continuous>  dextrose 50% Injectable 25 Gram(s) IV Push once  dextrose 50% Injectable 25 Gram(s) IV Push once  dextrose 50% Injectable 12.5 Gram(s) IV Push once  DULoxetine 30 milliGRAM(s) Oral daily  fluticasone propionate 50 MICROgram(s)/spray Nasal Spray 1 Spray(s) Both Nostrils two times a day  glucagon  Injectable 1 milliGRAM(s) IntraMuscular once  heparin  Infusion.  Unit(s)/Hr (17 mL/Hr) IV Continuous <Continuous>  hydrocodone/homatropine Syrup 5 milliLiter(s) Oral every 8 hours  insulin glargine Injectable (LANTUS) 14 Unit(s) SubCutaneous at bedtime  insulin lispro (ADMELOG) corrective regimen sliding scale   SubCutaneous at bedtime  insulin lispro (ADMELOG) corrective regimen sliding scale   SubCutaneous three times a day before meals  insulin lispro Injectable (ADMELOG) 7 Unit(s) SubCutaneous three times a day before meals  lactobacillus acidophilus 1 Tablet(s) Oral three times a day with meals  lidocaine   4% Patch 1 Patch Transdermal daily  methylPREDNISolone sodium succinate Injectable 40 milliGRAM(s) IV Push daily  metoprolol tartrate 25 milliGRAM(s) Oral three times a day  pantoprazole  Injectable 40 milliGRAM(s) IV Push every 12 hours  sildenafil (REVATIO) 20 milliGRAM(s) Oral every 8 hours  sodium chloride 0.65% Nasal 1 Spray(s) Both Nostrils two times a day  trimethoprim  40 mG/sulfamethoxazole 200 mG Suspension 400 milliGRAM(s) Oral every 8 hours    MEDICATIONS  (PRN):  albuterol/ipratropium for Nebulization 3 milliLiter(s) Nebulizer every 6 hours PRN Shortness of Breath and/or Wheezing  bisacodyl Suppository 10 milliGRAM(s) Rectal daily PRN Constipation  dextrose Oral Gel 15 Gram(s) Oral once PRN Blood Glucose LESS THAN 70 milliGRAM(s)/deciliter  guaiFENesin Oral Liquid (Sugar-Free) 200 milliGRAM(s) Oral every 6 hours PRN Cough  morphine  - Injectable 0.5 milliGRAM(s) IV Push every 6 hours PRN Moderate Pain (4 - 6)      ITEMS UNCHECKED ARE NOT PRESENT    PRESENT SYMPTOMS: [ ]Unable to self-report due to altered mental status- see [ ] CPOT [ ] PAINADS [ ] RDOS  Source if other than patient:  [ ]Family   [ ]Team     Pain: [x ]yes [ ]no  QOL impact -   Location -      back              Aggravating factors - movement  Quality -  Radiation - none  Timing- intermittent   Severity (0-10 scale): 6   Minimal acceptable level / Pain goal (0-10 scale): 3      Dyspnea:                           [ ]Mild [x ]Moderate [ ]Severe  Anxiety:                             [x ]Mild [ ]Moderate [ ]Severe  Agitation:                          [ ]Mild [ ]Moderate [ ]Severe  Fatigue:                             [ ]Mild [ ]Moderate [ ]Severe  Nausea:                             [ ]Mild [ ]Moderate [ ]Severe  Loss of appetite:              [ ]Mild [ ]Moderate [ ]Severe  Constipation:                   [ ]Mild [ ]Moderate [ ]Severe  Diarrhea:                          [ ]Mild [ ]Moderate [ ]Severe    CPOT:    https://[email or web address]/getattachment/[email or web address]/Critical-Care-Pain-Observation-Tool-(CPOT)    PCSSQ[Palliative Care Spiritual Screening Question]   Severity (0-10):  Score of 4 or > indicate consideration of Chaplaincy referral.  Chaplaincy Referral: [ ] yes [ ] refused [ ] following [x ] deferred    Caregiver [place]? : [ ] yes [ ] no [ ] Declined [x ] Deferred              Social work referral [ ] Patient & Family Centered Care Referral [ ]     Anticipatory Grief present?:  [ ] yes [ ] no  [ x] Deferred                  Social work referral [ ] Chaplaincy Referral[ ]    Other Symptoms:  [x ]All other review of systems negative     PHYSICAL EXAM:  Vital Signs Last 24 Hrs  T(C): 37.4 (19 May 2023 08:00), Max: 37.4 (19 May 2023 08:00)  T(F): 99.3 (19 May 2023 08:00), Max: 99.3 (19 May 2023 08:00)  HR: 76 (19 May 2023 11:27) (75 - 107)  BP: --  BP(mean): --  RR: 24 (19 May 2023 11:27) (18 - 33)  SpO2: 97% (19 May 2023 11:27) (91% - 100%)    Parameters below as of 19 May 2023 11:27  Patient On (Oxygen Delivery Method): nasal cannula, high flow  O2 Flow (L/min): 60  O2 Concentration (%): 100       GENERAL: Resting after ativan dose  [ ]Alert  [ ]Oriented   [ ]Lethargic  [ ]Cachexia  [ ]Unarousable  [ ]Verbal  [ ]Non-Verbal    Behavioral:   [x ] Anxiety  [ x] Delirium [ ] Agitation [ ] Calm  [ ] Other  HEENT:  [ x]Normal  [ ] Temporal Wasting  [ ]Dry mouth   [ ]ET Tube/Trach  [ ]Oral lesions  [ ] Mucositis  PULMONARY:   [ ]Clear [ x]Tachypnea  [ ]Audible excessive secretions   [ x]Rhonchi        [ ]Right [ ]Left [ x]Bilateral  [ ]Crackles        [ ]Right [ ]Left [ ]Bilateral  [ ]Wheezing     [ ]Right [ ]Left [ ]Bilateral  [ ]Diminished breath sounds [ ]right [ ]left [ ]bilateral  CARDIOVASCULAR:    [x ]Regular [ ]Irregular [ ]Tachy  [ ]Greg [ ]Murmur [ ]Other  GASTROINTESTINAL:  [x ]Soft  [ ]Distended   [ ]+BS  [x ]Non tender [ ]Tender  [ ]PEG [ ]OGT/ NGT  Last BM: 5/15  GENITOURINARY:  [ ]Normal [ ] Incontinent   [ ]Oliguria/Anuria   [x ]Caeves  MUSCULOSKELETAL:   [ ]Normal   [x ]Weakness  [x ]Bed/Wheelchair bound [ ]Edema  [  ] amputation  [  ] contraction  NEUROLOGIC:   [ x]No focal deficits  [ ]Cognitive impairment  [ ]Dysphagia [ ]Dysarthria [ ]Paresis [ ]Other   SKIN: See Nursing Skin Assessment for further details  [x ]Normal    [ ]Rash  [ ]Pressure ulcer(s)       Present on admission [ ]y [ ]n   [  ]  Wound    [  ] hyperpigmentation      CRITICAL CARE:  [ ]Shock Present  [ ]Septic [ ]Cardiogenic [ ]Neurologic [ ]Hypovolemic  [ ]Vasopressors [ ]Inotropes  [ x]Respiratory failure present [ ]Mechanical Ventilation [ ]Non-invasive ventilatory support [ x]High-Flow   [ ]Acute  [ ]Chronic [x ]Hypoxic  [ ]Hypercarbic [ ]Other  [ ]Other organ failure     LABS:  reviewed                              7.9    29.39 )-----------( 377      ( 19 May 2023 06:00 )             26.4       05-19    133<L>  |  96<L>  |  27<H>  ----------------------------<  134<H>  4.5   |  25  |  1.04    Ca    10.0      19 May 2023 00:45  Phos  3.1     05-19  Mg     1.60     05-19    TPro  6.3  /  Alb  2.8<L>  /  TBili  0.4  /  DBili  x   /  AST  20  /  ALT  15  /  AlkPhos  150<H>  05-19      PT/INR - ( 19 May 2023 06:00 )   PT: 13.9 sec;   INR: 1.20 ratio         PTT - ( 19 May 2023 06:00 )  PTT:60.4 sec    RADIOLOGY & ADDITIONAL STUDIES: reviewed     Protein Calorie Malnutrition Present: [ ]mild [ ]moderate [ ]severe [ ]underweight [ ]morbid obesity  https://www.andeal.org/vault/2440/web/files/ONC/Table_Clinical%20Characteristics%20to%20Document%20Malnutrition-White%20JV%20et%20al%202012.pdf    Height (cm): 162.6 (05-09-23 @ 23:10)  Weight (kg): 92.9 (05-09-23 @ 23:10)  BMI (kg/m2): 35.1 (05-09-23 @ 23:10)    [ ]PPSV2 < or = 30%  [ ]significant weight loss [ ]poor nutritional intake [ ]anasarca   [ ]Artificial Nutrition    REFERRALS:   [ ]Chaplaincy  [ ]Hospice  [ ]Child Life  [ ]Social Work  [x ]Case management [ ]Holistic Therapy

## 2023-05-19 NOTE — PROGRESS NOTE ADULT - NS PRO AD ANY ON CHART
You can access the FollowMyHealth Patient Portal offered by [facility] by registering at the following website: [email or web address]. By joining [facility]’s FollowMyHealth portal, you will also be able to view your health information using other applications (apps) compatible with our system. Yes

## 2023-05-19 NOTE — PROGRESS NOTE ADULT - CONVERSATION DETAILS
Referral to palliative care for complex decision making and symptom management in setting of advanced illness. Met with HCP's Joyce (daughter) and Emi (sister). Dr. Coulter explained the patients current medical status and disease trajectory. Explained at length that despite all medical interventions patient continues to decline with no underlying reversible cause. Suggested we redirect our medical approach to ensure comfort. Would not recommend escalation of care and blood work - family in agreement as they do not want to prolong patients suffering.     Advanced care planning extensively discussed. Reviewed the risks and benefits of resuscitative measures including cardiopulmonary resuscitation and mechanical ventilation at the end of life in the setting of advanced illness. Family understands that these interventions may pose more burden than benefit and are amenable to DNR/DNI with limited medical interventions (IV abx, steroids, high flow NC) and completion of MOLST form. MOLST form completed and placed on patient's medical record. Emotional support provided.     Offered chaplaincy for spiritual support and child life referral for assistance with speaking to patient grandchildren (Joyce's children ages 18-2). Family declined chaplaincy but was in agreement with child life, referral made- spoke to Fatuma . Fatuma to reach out to Joyce today. Referral to palliative care for complex decision making and symptom management in setting of advanced illness. Met with HCP's Joyce (daughter) and Emi (sister). Dr. Coulter explained the patients current medical status and disease trajectory. Explained at length that despite all medical interventions patient continues to decline with no underlying reversible cause. Unfortunately patient is not a candidate for lung transplant. Suggested we redirect our medical approach to ensure comfort. Would not recommend escalation of care and blood work - family in agreement as they do not want to prolong patients suffering. Explained the use of medications such as morphine & ativan to assist with aggressive symptom management.     Advanced care planning extensively discussed. Reviewed the risks and benefits of resuscitative measures including cardiopulmonary resuscitation and mechanical ventilation at the end of life in the setting of advanced illness. Family understands that these interventions may pose more burden than benefit and are amenable to DNR/DNI with limited medical interventions (IV abx, steroids, high flow NC, symptom medications) and completion of MOLST form. MOLST form completed and placed on patient's medical record. Emotional support provided.     Offered chaplaincy for spiritual support and child life referral for assistance with speaking to patient grandchildren (Joyce's children ages 18-2). Family declined chaplaincy but was in agreement with child life, referral made- spoke to Fatuma . Fatuma to reach out to Joyce today. Referral to palliative care for complex decision making and symptom management in setting of advanced illness.   Met with HCP's Joyce (daughter) and Emi (sister). Dr. Coulter explained the patients current medical status and disease trajectory. Explained at length that despite all medical interventions patient continues to decline with no underlying reversible cause. Unfortunately patient is not a candidate for lung transplant. Recommended to shift our medical approach to ensure comfort, and would not recommend escalation of care and blood work - family in agreement as they do not want to prolong patients suffering. Explained the use of medications such as morphine & ativan to assist with aggressive symptom management.     Advanced care planning extensively discussed. Reviewed the risks and benefits of resuscitative measures including cardiopulmonary resuscitation and mechanical ventilation at the end of life in the setting of advanced illness. Family understands that these interventions may pose more burden than benefit and are amenable to DNR/DNI with limited medical interventions (IV abx, steroids, high flow NC, symptom medications) and completion of MOLST form. MOLST form completed and placed on patient's medical record. Emotional support provided.     Offered chaplaincy for spiritual support and child life referral for assistance with speaking to patient grandchildren (Joyce's children ages 18-2). Family declined chaplaincy but was in agreement with child life, referral made- spoke to Fatuma . Fatuma to reach out to Joyce today.

## 2023-05-19 NOTE — PROGRESS NOTE ADULT - ASSESSMENT
61 y/o F with PMH of DM2, HTN, HLD, obesity, non-obstructive CAD, ALMA on CPAP, and ILD thought secondary to prior Methotrexate use, RA and Psoriatic arthritis who was transferred from [facility] on 5/9 for acute hypoxemic respiratory failure in the setting of recent MRSA bacteremia and suspected ILD exacerbation    Neuro  Alerted and oriented x4 at baseline, hx of pain RRT on 4/21 at [facility] for left facial droop, found to have left sided carotid stenosis only   -currently alert and oriented though restless   -Palliative care was consulted at OSH for multimodal pain management for diffuse arthralgias/myalgias possible 2/2 RA vs fibromyalgia   -c/w Cymbalta, amitriptyline, lidocaine patch, and tylenol prn  -PT following      Pulmonary    Hx of past smoker, ALMA on CPAP, ILD 2/2 ?methotrexate use, pulmonary HTN (remotely on sildenafil stopped ~1 month ago)  who presented to [facility] on 4/10 for acute hypoxemic respiratory failure was on 40L/70% at [facility] alternating with BIPAP 12/5 70%, episodes of severe hypoxia and tachypnea when lying flat (~55-70%) started on NO on 5/10 with mild improvement.   -noted some hemoptysis this AM, will start hycodan, flonase, and saline nasal spray and monitor closely     - currently on NO 30PPM plan to come down slowly as tolerated - goal for 20 by end of day   - Continue HFNC 60L/ titrating fio2 as needed - currently on 60-70% will taper down as tolerated  - currently on sildenafil 20mg TID  - methemoglobin 0.2, continue to trend   - cant use nocturnal Bipap d/t NO - no significant hypercapnia on ABG  - Bronchodilators prn   - Continue steroids currently on solumedrol 40mg daily x5 days as part of PCP treatment (day 2)  - Fungitell elevated to 246 - started on PCP treatment with Bactrim - transitioned to PO to decrease fluid load  - follows Dr. Kim at [facility]   - will eventually obtain CTPA as patient has been hospitalized on and off since early April - presently unable to lie flat  -consulted lung transplant team, is not considered a transplant candidate at this time    - adding on low dose morphine on 5/18 for dyspnea     Cardiac   Hx of HTN and nonobstructive CAD, reported diastolic dysfunction, and pulmonary hypertension (last left/right sided cath at [facility] 12/2022)  - continue statin   - c/w metoprolol tartrate 25mg TID  - holding nifedipine given normotension   - official ECHO 5/10 with Normal LV systolic function, RV enlargement with decreased RV systolic function and severe tricuspid regurgitation. Estimated pulmonary artery systolic pressure equals 96 mm,   evidence of severe pulmonary hypertension.  - pro-BNP [id number] on 5/10  - c/w diuresis with Bumex - now downtrended to 1mg QD PO and continue to monitor electrolytes    GI  -tolerating diet - regular consistent carb  -PPI BID for now   -active BM  -episode of melena on 5/15    /Renal  Creat on admission 0.5  -was uptrending, now stable, SHAAN likely medication induced (bactrim) vs diuresis   -bumex changed to 1mg daily   -replete electrolytes and trend Q12h while diuresing   - TOV today, BS q8hrs     Endocrine   DM2 A1c 7.1, hyperglycemia on steroids, reported to have hypercalcemia 2/2 left parathyroid adenoma s/p Alendronate x1   - continue sliding scale and increase premeal 5 units to 7 units, increase as needed - continue Lantus to 14 units  -currently on solumedrol as part of PCP treatment   - prednisone 40mg daily changed to solumedrol 40mg Q8h on 5/10 for ILD treatment but has shown minimal improvement will change to PCP dosing, now on 40mg daily x 5days   - s/p Alendronate x1 with resolved hypercalcemia will continue to monitor    - Thyroid nodule noted on imaging - will need outpatient follow-up    Rheumatology   Hx of RA/psoriatic arthritis diagnosed ~2016, now seronegative, follows with Dr. Rush outpatient, failed multiple modalities (methotrexate, humira, xeljanz and rituximab due to insurance issues) recently on leflunomide.   -OSH records showed patient was negative  Kassi-1, SS-B, SS-A, RNP, SIRISHA, and RF   -repeated RF, SIRISHA, Double stranded DNA, neutrophil cytoplasmic antibody, myomarker panel   -Now with negative RF - per Dr. Rush was previously seropositive    Infectious Disease   MRSA bacteremia at [facility]. Cultures grew MRSA and was initially treated with vancomycin (4/21-4/25 and 5/3-5/8) but continued to have positive blood cultures x5 and subsequently started on Daptomycin on 4/25 with dose increase on 5/1. As per OSH records, the plan was to continue the Daptomycin for a 6 week course and Rifampin (started on 5/1) for a 14 day course.  - continue current antibiotics: 6 week course of Daptomycin for MRSA bacteremia (5/1-  (plan to 5-30) ) and Bactrim PCP (5/11- )  for 21 days given increased LDH and Fungitell  - s/p empiric Meropenem (5/8-5/14 )  -last CK 22, will check with next blood draw    - f/u repeat blood cultures all NGTD    - RVP negative  - LDH elevated to 743   - ID following    Heme/DVT PPX  Anemia and now with left axillary DVT  - Required PRBC transfusion 5/11 now drop in Hgb 5/15 requiring 1u PRBC   -VA duplex on 5/16 showed left axillary DVT, Hgb stable so will start heparin gtt today, low range 50-70 goal   -FOBT positive with dark brown stool on 5/15 Will monitor  -Ecchymoses noted over RUE - has good pulses. Will outline and monitor for change.  - heparin gtt stopped on 5/19 for brbpr       GOC:   -Full Code - [facility] from [facility] confirmed with patient -   - Family meeting today   -HCP filled out with daughter Joyce Alicea as primary and secondary is her sister Emi Sánchez   -GOC discussion with patient and  at bedside   -consulted palliative to assist continuing ongoing GOC discussions

## 2023-05-19 NOTE — PROGRESS NOTE ADULT - TIME BILLING
Time spent for extensive review of the physical chart, electronic health record, and documentation to obtain collateral information   Time spent discussing and coordinating care with primary team and interdisciplinary staff and floor staff
Time spent for extensive review of the physical chart, electronic health record, and documentation to obtain collateral information   Time spent discussing and coordinating care with primary team and interdisciplinary staff and floor staff

## 2023-05-19 NOTE — PROGRESS NOTE ADULT - CONVERSATION/DISCUSSION
Diagnosis/Prognosis/MOLST Discussed/Treatment Options/Chaplaincy Referral/Child Life Referral (JUSTIN,CHELA)/Palliative Care Referral

## 2023-05-19 NOTE — PROGRESS NOTE ADULT - PROBLEM SELECTOR PLAN 2
Patient requires support with ADLs. Please assist with ADLs PRN.  Skin care as per protocol. - IV Ativan 0.25mg PRN; if inadequate relief and can increase to IV Ativan 0.5mg PRN

## 2023-05-20 NOTE — PROGRESS NOTE ADULT - SUBJECTIVE AND OBJECTIVE BOX
Follow Up:  bacteremia    Interval History/ROS:  Overnight: No acute events.  Patient remains afebrile otherwise hemodynamically stable on high flow nasal cannula.     Patient seen examined at bedside.  Denies any new pain or discomfort.  Allergies  penicillins (Other)    ANTIMICROBIALS:  DAPTOmycin IVPB 650 every 24 hours  trimethoprim  40 mG/sulfamethoxazole 200 mG Suspension 400 every 8 hours    OTHER MEDS:  MEDICATIONS  (STANDING):  albuterol/ipratropium for Nebulization 3 every 6 hours PRN  amitriptyline 10 at bedtime  atorvastatin 20 at bedtime  bisacodyl Suppository 10 daily PRN  buMETAnide Injectable 1 daily  dextrose 50% Injectable 25 once  dextrose 50% Injectable 25 once  dextrose 50% Injectable 12.5 once  dextrose Oral Gel 15 once PRN  DULoxetine 30 daily  glucagon  Injectable 1 once  guaiFENesin Oral Liquid (Sugar-Free) 200 every 6 hours PRN  hydrocodone/homatropine Syrup 5 every 8 hours  HYDROmorphone  Injectable 0.5 every 4 hours PRN  insulin lispro (ADMELOG) corrective regimen sliding scale  three times a day before meals  insulin lispro (ADMELOG) corrective regimen sliding scale  at bedtime  LORazepam   Injectable 0.5 every 12 hours PRN  methylPREDNISolone sodium succinate Injectable 40 daily  pantoprazole  Injectable 40 every 12 hours  sildenafil (REVATIO) 20 every 8 hours      Vital Signs Last 24 Hrs  T(C): 37.1 (20 May 2023 08:00), Max: 37.2 (19 May 2023 16:00)  T(F): 98.7 (20 May 2023 08:00), Max: 98.9 (19 May 2023 16:00)  HR: 111 (20 May 2023 12:00) (70 - 116)  BP: --  BP(mean): --  RR: 23 (20 May 2023 12:00) (19 - 32)  SpO2: 98% (20 May 2023 12:00) (85% - 100%)    Parameters below as of 20 May 2023 12:00  Patient On (Oxygen Delivery Method): nasal cannula, high flow  O2 Flow (L/min): 60  O2 Concentration (%): 100    PHYSICAL EXAM:  Constitutional: lethargic, restless when awakes during exam  HFNC  RS: BS bilat  CVS: S1, S2   Abdomen: Soft. No guarding/rigidity/tenderness.  Extremities: ecchymosis right arm fading   Vascular:  IC x2 right arm  [place] right radial  Neuro: Cranial nerves 2-12 grossly normal. No focal abnormalities               7.9    29.39 )-----------( 377      ( 19 May 2023 06:00 )             26.4       05-19    133<L>  |  96<L>  |  27<H>  ----------------------------<  134<H>  4.5   |  25  |  1.04    Ca    10.0      19 May 2023 00:45  Phos  3.1     05-19  Mg     1.60     05-19    TPro  6.3  /  Alb  2.8<L>  /  TBili  0.4  /  DBili  x   /  AST  20  /  ALT  15  /  AlkPhos  150<H>  05-19

## 2023-05-20 NOTE — PROGRESS NOTE ADULT - ASSESSMENT
61 y/o F with PMH of DM2, HTN, HLD, obesity, non-obstructive CAD, ALMA on CPAP, and ILD thought secondary to prior Methotrexate use, RA and Psoriatic arthritis who was transferred from [facility] on 5/9 for acute hypoxemic respiratory failure in the setting of recent MRSA bacteremia and suspected ILD exacerbation    Neuro  Alerted and oriented x4 at baseline, hx of pain RRT on 4/21 at [facility] for left facial droop, found to have left sided carotid stenosis only   -currently alert though restless   -Palliative input appreciated, currently using Ativan and dilaudid PRN for dyspnea and pain  -c/w Cymbalta, amitriptyline, lidocaine patch, and Tylenol prn, although patient is not currently taking PO    Pulmonary    Hx of past smoker, ALMA on CPAP, ILD 2/2 ?methotrexate use, pulmonary HTN (remotely on sildenafil stopped ~1 month ago)  who presented to [facility] on 4/10 for acute hypoxemic respiratory failure was on 40L/70% at [facility] alternating with BIPAP 12/5 70%, episodes of severe hypoxia and tachypnea when lying flat (~55-70%) started on NO on 5/10 with mild improvement.   - currently on NO 30PPM plan to come down slowly as tolerated  - Continue HFNC 60L/100%, DNR/DNI  - currently on sildenafil 20mg TID, but not taking PO  - Bronchodilators prn   - Continue steroids  - Fungitell elevated to 246 - started on PCP treatment with Bactrim  - follows Dr. Kim at [facility]   - [facility] discussion yesterday, now DNR/DNI    Cardiac   Hx of HTN and nonobstructive CAD, reported diastolic dysfunction, and pulmonary hypertension (last left/right sided cath at [facility] 12/2022)  - continue statin   - c/w metoprolol tartrate 25mg TID  - official ECHO 5/10 with Normal LV systolic function, RV enlargement with decreased RV systolic function and severe tricuspid regurgitation. Estimated pulmonary artery systolic pressure equals 96 mm,   evidence of severe pulmonary hypertension.  - pro-BNP 89403 on 5/10  - c/w diuresis     GI  -regular diet  -PPI BID    -episode of melena on 5/15    /Renal  Creat on admission 0.5  -was uptrending, now stable, SHAAN likely medication induced (bactrim) vs diuresis   -bumex changed to 1mg daily   - No further lab draws per goals of care    Endocrine   DM2 A1c 7.1, hyperglycemia on steroids, reported to have hypercalcemia 2/2 left parathyroid adenoma s/p Alendronate x1   - continue sliding scale and increase premeal 5 units to 7 units, increase as needed - continue Lantus to 14 units  -currently on solumedrol as part of PCP treatment   - s/p Alendronate x1 with resolved hypercalcemia will continue to monitor      Rheumatology   Hx of RA/psoriatic arthritis diagnosed ~2016, now seronegative, follows with Dr. Rush outpatient, failed multiple modalities (methotrexate, humira, xeljanz and rituximab due to insurance issues) recently on leflunomide.   -OSH records showed patient was negative  Kassi-1, SS-B, SS-A, RNP, SIRISHA, and RF   -repeated RF, SIRISHA, Double stranded DNA, neutrophil cytoplasmic antibody, myomarker panel   -Now with negative RF - per Dr. Rush was previously seropositive    Infectious Disease   MRSA bacteremia at [facility]. Cultures grew MRSA and was initially treated with vancomycin (4/21-4/25 and 5/3-5/8) but continued to have positive blood cultures x5 and subsequently started on Daptomycin on 4/25 with dose increase on 5/1. As per OSH records, the plan was to continue the Daptomycin for a 6 week course and Rifampin (started on 5/1) for a 14 day course.  - continue current antibiotics: 6 week course of Daptomycin for MRSA bacteremia (5/1-  (plan to 5-30) ) and Bactrim PCP (5/11- )  for 21 days given increased LDH and Fungitell  - s/p empiric Meropenem (5/8-5/14 )  -last CK 22, will check with next blood draw    - f/u repeat blood cultures all NGTD    - RVP negative  - LDH elevated to 743   - ID following  - Continuing antibiotics for now    Heme/DVT PPX  Anemia and now with left axillary DVT  - Required PRBC transfusion 5/11 now drop in Hgb 5/15 requiring 1u PRBC   -VA duplex on 5/16 showed left axillary DVT, Hgb stable so will start heparin gtt today, low range 50-70 goal   -FOBT positive with dark brown stool on 5/15 Will monitor  -Ecchymoses noted over RUE - has good pulses. Will outline and monitor for change.  - heparin gtt stopped on 5/19 for brbpr       GOC:   -GOC discussion yesterday, DNR/DNI, no further blood draws. Will continue medical interventions for now (steroids, antibiotics) and dilaudid and ativan ordered for dyspnea and pain  -HCP filled out with daughter Joyce Alicea as primary and secondary is her sister Emi Sánchez   -Palliative recs appreciated

## 2023-05-20 NOTE — PROGRESS NOTE ADULT - SUBJECTIVE AND OBJECTIVE BOX
CHIEF COMPLAINT: SOB    Interval Events: Patient made DNR/DNI, no blood draws yesterday. On Ativan and Dilaudid as needed. On HFNC    REVIEW OF SYSTEMS:  [x] Unable to assess ROS because AMS    OBJECTIVE:  ICU Vital Signs Last 24 Hrs  T(C): 36.7 (20 May 2023 04:00), Max: 37.2 (19 May 2023 16:00)  T(F): 98.1 (20 May 2023 04:00), Max: 98.9 (19 May 2023 16:00)  HR: 106 (20 May 2023 07:00) (70 - 116)  BP: --  BP(mean): --  ABP: 134/118 (20 May 2023 07:00) (60/56 - 134/118)  ABP(mean): 124 (20 May 2023 07:00) (57 - 124)  RR: 29 (20 May 2023 07:00) (18 - 32)  SpO2: 85% (20 May 2023 07:00) (85% - 100%)    O2 Parameters below as of 20 May 2023 07:00  Patient On (Oxygen Delivery Method): nasal cannula, high flow  O2 Flow (L/min): 60  O2 Concentration (%): 100          05-19 @ 07:01  -  05-20 @ 07:00  --------------------------------------------------------  IN: 165 mL / OUT: 850 mL / NET: -685 mL      CAPILLARY BLOOD GLUCOSE      POCT Blood Glucose.: 87 mg/dL (19 May 2023 12:37)      PHYSICAL EXAM:  General: Laying in bed, on HFNC  HEENT: NC/AT  Lymph Nodes: No LAD  Neck: Supple  Respiratory: Scattered rhonchi  Cardiovascular: S1S2, RRR  Abdomen: Soft, nontender  Extremities: No edema  Skin: No new lesions  Neurological: Altered, but calm and responds to some questions  Psychiatry: Difficult to assess    LINES: [facility]    HOSPITAL MEDICATIONS:    DAPTOmycin IVPB 650 milliGRAM(s) IV Intermittent every 24 hours  trimethoprim  40 mG/sulfamethoxazole 200 mG Suspension 400 milliGRAM(s) Oral every 8 hours    buMETAnide Injectable 1 milliGRAM(s) IV Push daily  sildenafil (REVATIO) 20 milliGRAM(s) Oral every 8 hours    atorvastatin 20 milliGRAM(s) Oral at bedtime  dextrose 50% Injectable 25 Gram(s) IV Push once  dextrose 50% Injectable 25 Gram(s) IV Push once  dextrose 50% Injectable 12.5 Gram(s) IV Push once  dextrose Oral Gel 15 Gram(s) Oral once PRN  glucagon  Injectable 1 milliGRAM(s) IntraMuscular once  insulin lispro (ADMELOG) corrective regimen sliding scale   SubCutaneous three times a day before meals  insulin lispro (ADMELOG) corrective regimen sliding scale   SubCutaneous at bedtime  methylPREDNISolone sodium succinate Injectable 40 milliGRAM(s) IV Push daily    albuterol/ipratropium for Nebulization 3 milliLiter(s) Nebulizer every 6 hours PRN  guaiFENesin Oral Liquid (Sugar-Free) 200 milliGRAM(s) Oral every 6 hours PRN  hydrocodone/homatropine Syrup 5 milliLiter(s) Oral every 8 hours    amitriptyline 10 milliGRAM(s) Oral at bedtime  DULoxetine 30 milliGRAM(s) Oral daily  HYDROmorphone  Injectable 0.5 milliGRAM(s) IV Push every 4 hours PRN  LORazepam   Injectable 0.5 milliGRAM(s) IV Push every 12 hours PRN    bisacodyl Suppository 10 milliGRAM(s) Rectal daily PRN  pantoprazole  Injectable 40 milliGRAM(s) IV Push every 12 hours        cholecalciferol 1000 Unit(s) Oral daily  dextrose 5%. 1000 milliLiter(s) IV Continuous <Continuous>  dextrose 5%. 1000 milliLiter(s) IV Continuous <Continuous>      chlorhexidine 2% Cloths 1 Application(s) Topical <User Schedule>  fluticasone propionate 50 MICROgram(s)/spray Nasal Spray 1 Spray(s) Both Nostrils two times a day  lidocaine   4% Patch 1 Patch Transdermal daily  sodium chloride 0.65% Nasal 1 Spray(s) Both Nostrils two times a day    lactobacillus acidophilus 1 Tablet(s) Oral three times a day with meals      LABS:                        7.9    29.39 )-----------( 377      ( 19 May 2023 06:00 )             26.4     Hgb Trend: 7.9<--, 8.2<--, 8.0<--, 7.6<--, 7.7<--  05-19    133<L>  |  96<L>  |  27<H>  ----------------------------<  134<H>  4.5   |  25  |  1.04    Ca    10.0      19 May 2023 00:45  Phos  3.1     05-19  Mg     1.60     05-19    TPro  6.3  /  Alb  2.8<L>  /  TBili  0.4  /  DBili  x   /  AST  20  /  ALT  15  /  AlkPhos  150<H>  05-19    Creatinine Trend: 1.04<--, 0.90<--, 1.01<--, 1.28<--, 1.42<--, 1.36<--  PT/INR - ( 19 May 2023 06:00 )   PT: 13.9 sec;   INR: 1.20 ratio         PTT - ( 19 May 2023 06:00 )  PTT:60.4 sec    Arterial Blood Gas:  05-19 @ 06:00  7.37/48/76/28/94.5/2.1  ABG lactate: --  Arterial Blood Gas:  05-19 @ 00:45  7.38/46/58/27/88.2/1.8  ABG lactate: --  Arterial Blood Gas:  05-18 @ 20:45  7.39/49/121/30/98.3/4.1  ABG lactate: --        MICROBIOLOGY:     RADIOLOGY:  [ ] Reviewed and interpreted by me    EKG:

## 2023-05-20 NOTE — PROGRESS NOTE ADULT - ASSESSMENT
62F with ILD on 3.5L home O2/CPAP and chronic prednisone, pulmonary HTN, DM2, RA, psoriatic arthritis presented to [facility] ED for worsening dyspnea and hypoxia April 2023, admitted for ILD with exacerbation and acute on chronic hypoxic respiratory failure. She was treated with high-dose corticosteroids and started on HFNC, course complicated by AMS, found to have demonstrated high grade L-sided carotid stenosis, course further complicated by worsening hypoxia requiring BIPAP and fever, transferred to MICU, found to have MRSA bacteremia (4/24), initially treated with Vancomycin, switched to Daptomycin (4/25) with subsequent increased dose on (5/1), Blood culture cleared on (5/4), suspected source was from superficial thrombophlebitis, seen on US (4/17: Superficial thrombus is seen in the left cephalic vein at the level of the forearm. No DVT), planned for 6 weeks course of dapto with Rifampin 300mg PO q12 for total of 2 week course, course again complicated by low grade fever 100.7F (5/8), added meropenem for empiric coverage. Eventually patient transferred to [facility], ID consulted for assistance.    Denies any cardiac device, hardware, or prosthetic joint replacement  has not received biologics for arthritis in last year     CXR with diffuse opacity consistent with ILD  US LUE (4/17): Superficial thrombus is seen in the left cephalic vein at the level of the forearm. No DVT   VA duplex 5/16 DVT+ left axillary   BCx (4/24, 4/25, 4/27, 4/28, 5/1, 5/2) positive for MRSA  BCx (5/4) no growth  TTE (5/10) without obvious vegetation   BCx 5/9 no growth     Antimicrobials :    DAPTOmycin IVPB    DAPTOmycin IVPB 650 every 24 hours  5/10-   meropenem  IVPB 1000 every 8 hours   5/9 - 5/14  trimethoprim  40 mG/sulfamethoxazole 200 mG Suspension 400 every 8 hours 5/10-      #MRSA Bacteremia     high grade at outside hospital     unclear source cultures here 5/9 no growth     #Acute Hypoxic Respiratory Failure 2/2 ILD  bacteremia source was suspected L thrombophlebitis at [place]   still consider endocarditis in ddx   no obvious joint infection though is a consideration in patient with psoriatic arthritis  now with duplex showing DVT left axillary  This could represent endovascular source   Previously completed course meropenem for possible HAP  TMP-SMX added for possible pcp -elevated fungitell     #Leucocytosis   WBC trended up today 29K   concern for recurrence of bacteremia or other new focus  of infection   could also be reactive to GI bleed     RECOMMENDATIONS  Continue daptomycin for MRSA bacteremia  Obtain repeat blood cultures  Continue TMP-SMX due to concern for PCP infection given fungitell  MOnitor stool output  Ongoing GOC conversations noted - DNR/DNI  Follow fever curve and WBC count    Julian Corrales MD  Division of Infectious Diseases 62F with ILD on 3.5L home O2/CPAP and chronic prednisone, pulmonary HTN, DM2, RA, psoriatic arthritis presented to [facility] ED for worsening dyspnea and hypoxia April 2023, admitted for ILD with exacerbation and acute on chronic hypoxic respiratory failure. She was treated with high-dose corticosteroids and started on HFNC, course complicated by AMS, found to have demonstrated high grade L-sided carotid stenosis, course further complicated by worsening hypoxia requiring BIPAP and fever, transferred to MICU, found to have MRSA bacteremia (4/24), initially treated with Vancomycin, switched to Daptomycin (4/25) with subsequent increased dose on (5/1), Blood culture cleared on (5/4), suspected source was from superficial thrombophlebitis, seen on US (4/17: Superficial thrombus is seen in the left cephalic vein at the level of the forearm. No DVT), planned for 6 weeks course of dapto with Rifampin 300mg PO q12 for total of 2 week course, course again complicated by low grade fever 100.7F (5/8), added meropenem for empiric coverage. Eventually patient transferred to [facility], ID consulted for assistance.    Denies any cardiac device, hardware, or prosthetic joint replacement  has not received biologics for arthritis in last year     CXR with diffuse opacity consistent with ILD  US LUE (4/17): Superficial thrombus is seen in the left cephalic vein at the level of the forearm. No DVT   VA duplex 5/16 DVT+ left axillary   BCx (4/24, 4/25, 4/27, 4/28, 5/1, 5/2) positive for MRSA  BCx (5/4) no growth  TTE (5/10) without obvious vegetation   BCx 5/9 no growth     Antimicrobials :    DAPTOmycin IVPB    DAPTOmycin IVPB 650 every 24 hours  5/10-   meropenem  IVPB 1000 every 8 hours   5/9 - 5/14  trimethoprim  40 mG/sulfamethoxazole 200 mG Suspension 400 every 8 hours 5/10-      #MRSA Bacteremia     high grade at outside hospital     unclear source cultures here 5/9 no growth     #Acute Hypoxic Respiratory Failure 2/2 ILD  bacteremia source was suspected L thrombophlebitis at [place]   still consider endocarditis in ddx   no obvious joint infection though is a consideration in patient with psoriatic arthritis  now with duplex showing DVT left axillary  This could represent endovascular source   Previously completed course meropenem for possible HAP  TMP-SMX added for possible pcp -elevated fungitell     #Leucocytosis   WBC trended up today 29K   concern for recurrence of bacteremia or other new focus  of infection   could also be reactive to GI bleed     RECOMMENDATIONS  Continue daptomycin for MRSA bacteremia  Obtain repeat blood cultures  Continue TMP-SMX due to concern for PCP infection given fungitell  MOnitor stool output  Ongoing GOC conversations noted - DNR/DNI, discussed utility of antibiotics given clinical status - family considering comfort care measures  Follow fever curve and WBC count    Julian Corrales MD  Division of Infectious Diseases

## 2023-05-20 NOTE — PROGRESS NOTE ADULT - ATTENDING COMMENTS
Pt seen and examined. 62F with extensive history including ILD thought secondary to prior Methotrexate use, severe pulm HTN RA and Psoriatic arthritis, now with acute on chronic hypoxic resp failure 2/2 a combination of possible ILD flare, probable PJP PNA and fluid overload 2/2 acute on chronic decompensated RV failure, MRSA bacteremia, SHAAN 2/2 pre-renal ATN and L axillary DVT. Remains off AC due to rectal bleeding, cont Protonix BID. Remains on HFNC at 60 L/100 %, Angie being titrated off. Remains on Sildenafil, Bactrim, solumedrol IV and Bumex. Remains on Daptomycin IV and Bactrim, BCxs from 5/9 NGTD. Overall prognosis extremely poor. Not a transplant candidate. Extensive GOC discussions held with family. Now DNR/DNI with a goal to focus on keeping the patient comfortable per family wishes.

## 2023-05-21 NOTE — PROGRESS NOTE ADULT - ASSESSMENT
63 y/o F with PMH of DM2, HTN, HLD, obesity, non-obstructive CAD, ALMA on CPAP, and ILD thought secondary to prior Methotrexate use, RA and Psoriatic arthritis who was transferred from [facility] on 5/9 for acute hypoxemic respiratory failure in the setting of recent MRSA bacteremia and suspected ILD exacerbation. Patient is now DNR/DNI, and appears to be actively dying.    Neuro  - Currently AAOx0  - Continue Ativan and Dilaudid for comfort    Pulmonary    - Remains on HFNC and nitric oxide  - DNR/DNI  - Dilaudid PRN for dyspnea    Cardiac   Hx of HTN and nonobstructive CAD, reported diastolic dysfunction, and pulmonary hypertension (last left/right sided cath at [facility] 12/2022)  - Holding oral medications    GI  - PPI BID  - Not currently taking PO    /Renal  Creat on admission 0.5  - No further lab draws per goals of care    Endocrine   - No further finger sticks or insulin    Infectious Disease   MRSA bacteremia at [facility].   - Remains on daptomycin    Heme/DVT PPX  - heparin gtt stopped on 5/19 for brbpr       GOC:   -GOC discussion 5/19, DNR/DNI, no further blood draws. Will continue medical interventions for now (steroids, antibiotics) and dilaudid and ativan ordered for dyspnea and pain  -HCP filled out with daughter Joyce Alicea as primary and secondary is her sister Emi Sánchez   - Patient appears to be actively dying this AM, discussed with family at bedside  - Continue medications for dyspnea and pain, no further blood draws

## 2023-05-21 NOTE — PROGRESS NOTE ADULT - ATTENDING COMMENTS
Pt seen and examined. 62F with extensive history including ILD thought secondary to prior Methotrexate use, severe pulm HTN RA and Psoriatic arthritis, now with acute on chronic hypoxic resp failure 2/2 a combination of possible ILD flare, probable PJP PNA and fluid overload 2/2 acute on chronic decompensated RV failure, MRSA bacteremia, SHAAN 2/2 pre-renal ATN and L axillary DVT. Now with progressive hypotension and hypoxia. Family at bedside and aware that patient is dying. They would like to focus on keeping her comfortable. Remains DNR/DNI.

## 2023-05-21 NOTE — PROGRESS NOTE ADULT - SUBJECTIVE AND OBJECTIVE BOX
CHIEF COMPLAINT: SOB    Interval Events: Given an extra dose of dilaudid overnight. Patient appears to be actively dying.    REVIEW OF SYSTEMS:  [x] Unable to assess ROS because altered    OBJECTIVE:  ICU Vital Signs Last 24 Hrs  T(C): 35.9 (21 May 2023 04:00), Max: 36.6 (20 May 2023 12:00)  T(F): 96.6 (21 May 2023 04:00), Max: 97.9 (20 May 2023 16:00)  HR: 125 (21 May 2023 04:00) (98 - 125)  BP: --  BP(mean): --  ABP: 90/87 (21 May 2023 04:00) (90/87 - 113/69)  ABP(mean): 86 (21 May 2023 04:00) (71 - 87)  RR: 20 (21 May 2023 06:33) (16 - 29)  SpO2: 98% (21 May 2023 06:33) (85% - 98%)    O2 Parameters below as of 21 May 2023 06:33  Patient On (Oxygen Delivery Method): nasal cannula, high flow  O2 Flow (L/min): 60  O2 Concentration (%): 100          05-20 @ 07:01  -  05-21 @ 07:00  --------------------------------------------------------  IN: 0 mL / OUT: 635 mL / NET: -635 mL      CAPILLARY BLOOD GLUCOSE      POCT Blood Glucose.: 87 mg/dL (19 May 2023 12:37)      PHYSICAL EXAM:  General: Laying in bed, agonal breathing  HEENT: NC/AT, HFNC in place  Lymph Nodes: No LAD  Neck: Supple  Respiratory: Agonal breathing  Cardiovascular: S1S2, no m/g/r  Abdomen: Soft, nontender  Extremities: No edema  Skin: Cool, no new lesions  Neurological: AAOx0   Psychiatry: Unable to assess    LINES:    HOSPITAL MEDICATIONS:    DAPTOmycin IVPB 650 milliGRAM(s) IV Intermittent every 24 hours  trimethoprim  40 mG/sulfamethoxazole 200 mG Suspension 400 milliGRAM(s) Oral every 8 hours    buMETAnide Injectable 1 milliGRAM(s) IV Push daily  sildenafil (REVATIO) 20 milliGRAM(s) Oral every 8 hours    atorvastatin 20 milliGRAM(s) Oral at bedtime  glucagon  Injectable 1 milliGRAM(s) IntraMuscular once  methylPREDNISolone sodium succinate Injectable 40 milliGRAM(s) IV Push daily    albuterol/ipratropium for Nebulization 3 milliLiter(s) Nebulizer every 6 hours PRN  guaiFENesin Oral Liquid (Sugar-Free) 200 milliGRAM(s) Oral every 6 hours PRN  hydrocodone/homatropine Syrup 5 milliLiter(s) Oral every 8 hours    amitriptyline 10 milliGRAM(s) Oral at bedtime  DULoxetine 30 milliGRAM(s) Oral daily  HYDROmorphone  Injectable 1 milliGRAM(s) IV Push once  HYDROmorphone  Injectable 1 milliGRAM(s) IV Push every 4 hours PRN  LORazepam   Injectable 0.5 milliGRAM(s) IV Push every 12 hours PRN    bisacodyl Suppository 10 milliGRAM(s) Rectal daily PRN  pantoprazole  Injectable 40 milliGRAM(s) IV Push every 12 hours        cholecalciferol 1000 Unit(s) Oral daily      chlorhexidine 2% Cloths 1 Application(s) Topical <User Schedule>  fluticasone propionate 50 MICROgram(s)/spray Nasal Spray 1 Spray(s) Both Nostrils two times a day  lidocaine   4% Patch 1 Patch Transdermal daily  sodium chloride 0.65% Nasal 1 Spray(s) Both Nostrils two times a day    lactobacillus acidophilus 1 Tablet(s) Oral three times a day with meals      LABS:    Hgb Trend: 7.9<--, 8.2<--, 8.0<--, 7.6<--, 7.7<--        Creatinine Trend: 1.04<--, 0.90<--, 1.01<--, 1.28<--, 1.42<--, 1.36<--            MICROBIOLOGY:     RADIOLOGY:  [ ] Reviewed and interpreted by me    EKG:

## 2023-05-21 NOTE — DISCHARGE NOTE FOR THE EXPIRED PATIENT - HOSPITAL COURSE
61 y/o F with PMH of DM2, HTN, HLD, obesity, non-obstructive CAD, ALMA on CPAP, and ILD thought secondary to prior Methotrexate use, RA and Psoriatic arthritis who was transferred from [facility] on 5/9 for acute hypoxemic respiratory failure in the setting of recent MRSA bacteremia and suspected ILD exacerbation. Patient was treated with antibiotics, steroids, and supplemental oxygen. Patient's status continued to decline and after goals of care discussion, she was made DNR/DNI. Patient noted to have asystole on the monitor on 5/21 and was pronounced decreased at 12:12PM.

## 2023-05-21 NOTE — CHART NOTE - NSCHARTNOTEFT_GEN_A_CORE
Called to evaluate patient for asystole on the monitor.    On physical exam patient did not respond to verbal or physical stimuli. No spontaneous respirations.  Absent heart and breath sounds. Absent radial, femoral, and carotid pulses.   Pupils are fixed and dilated absent ZOEY, no corneal reflex.  EKG rhythm strip shows asystole.   Patient pronounced dead at 12:12pm. Attending notified.  Family updated at bedside. Patient is not an autopsy candidate.

## 2023-05-21 NOTE — PROGRESS NOTE ADULT - PROVIDER SPECIALTY LIST ADULT
Critical Care
Critical Care
Infectious Disease
MICU
MICU
Critical Care
MICU
MICU
Critical Care
Critical Care
Infectious Disease
Infectious Disease
Palliative Care
Palliative Care

## 2023-06-01 LAB
EJ: NEGATIVE — SIGNIFICANT CHANGE UP
ENA JO1 AB SER IA-ACNC: <20 UNITS — SIGNIFICANT CHANGE UP
ENA PM/SCL AB SER-ACNC: <20 UNITS — SIGNIFICANT CHANGE UP
ENA SM+RNP AB SER IA-ACNC: <20 UNITS — SIGNIFICANT CHANGE UP
ENA SS-A IGG SER QL: <20 UNITS — SIGNIFICANT CHANGE UP
FIBRILLARIN AB SER QL: NEGATIVE — SIGNIFICANT CHANGE UP
KU AB SER QL: NEGATIVE — SIGNIFICANT CHANGE UP
MDA-5 (P140)(CADM-140): <20 UNITS — SIGNIFICANT CHANGE UP
MI2 AB SER QL: NEGATIVE — SIGNIFICANT CHANGE UP
NXP-2 (P140): <20 UNITS — SIGNIFICANT CHANGE UP
OJ AB SER QL: NEGATIVE — SIGNIFICANT CHANGE UP
PL12 AB SER QL: NEGATIVE — SIGNIFICANT CHANGE UP
PL7 AB SER QL: NEGATIVE — SIGNIFICANT CHANGE UP
SRP AB SERPL QL: NEGATIVE — SIGNIFICANT CHANGE UP
TIF GAMMA (P155/140): <20 UNITS — SIGNIFICANT CHANGE UP
U2 SNRNP AB SER QL: NEGATIVE — SIGNIFICANT CHANGE UP
